# Patient Record
Sex: FEMALE | Race: WHITE | NOT HISPANIC OR LATINO | Employment: OTHER | URBAN - METROPOLITAN AREA
[De-identification: names, ages, dates, MRNs, and addresses within clinical notes are randomized per-mention and may not be internally consistent; named-entity substitution may affect disease eponyms.]

---

## 2017-01-12 ENCOUNTER — APPOINTMENT (OUTPATIENT)
Dept: NEUROLOGY | Facility: MEDICAL CENTER | Age: 67
End: 2017-01-12
Payer: MEDICAID

## 2017-01-18 ENCOUNTER — OFFICE VISIT (OUTPATIENT)
Dept: PHYSICAL MEDICINE AND REHAB | Facility: MEDICAL CENTER | Age: 67
End: 2017-01-18
Payer: MEDICAID

## 2017-01-18 VITALS
OXYGEN SATURATION: 92 % | SYSTOLIC BLOOD PRESSURE: 102 MMHG | DIASTOLIC BLOOD PRESSURE: 78 MMHG | HEART RATE: 77 BPM | TEMPERATURE: 98.1 F

## 2017-01-18 DIAGNOSIS — M16.12 ARTHRITIS OF LEFT HIP: ICD-10-CM

## 2017-01-18 DIAGNOSIS — Z98.1 S/P CERVICAL SPINAL FUSION: ICD-10-CM

## 2017-01-18 DIAGNOSIS — M25.552 LEFT HIP PAIN: ICD-10-CM

## 2017-01-18 DIAGNOSIS — F41.9 ANXIETY: ICD-10-CM

## 2017-01-18 DIAGNOSIS — M25.50 ARTHRALGIA, UNSPECIFIED JOINT: ICD-10-CM

## 2017-01-18 DIAGNOSIS — M62.838 MUSCLE SPASM: ICD-10-CM

## 2017-01-18 DIAGNOSIS — Z79.899 CONTROLLED SUBSTANCE AGREEMENT SIGNED: ICD-10-CM

## 2017-01-18 DIAGNOSIS — M54.2 NECK PAIN: ICD-10-CM

## 2017-01-18 DIAGNOSIS — M54.9 SPINAL PAIN: ICD-10-CM

## 2017-01-18 DIAGNOSIS — M19.90 ARTHRITIS: ICD-10-CM

## 2017-01-18 PROCEDURE — 99214 OFFICE O/P EST MOD 30 MIN: CPT | Performed by: PHYSICAL MEDICINE & REHABILITATION

## 2017-01-18 RX ORDER — CAPSAICIN 0.025 %
1 CREAM (GRAM) TOPICAL 3 TIMES DAILY PRN
COMMUNITY
Start: 2017-01-06 | End: 2019-04-26

## 2017-01-18 RX ORDER — HYDROMORPHONE HYDROCHLORIDE 4 MG/1
TABLET ORAL
Qty: 120 TAB | Refills: 0 | Status: SHIPPED | OUTPATIENT
Start: 2017-01-18 | End: 2017-01-18 | Stop reason: SDUPTHER

## 2017-01-18 RX ORDER — TIZANIDINE 4 MG/1
TABLET ORAL
Qty: 120 TAB | Refills: 5 | Status: SHIPPED | OUTPATIENT
Start: 2017-01-18 | End: 2021-07-31

## 2017-01-18 RX ORDER — MORPHINE SULFATE 60 MG/1
TABLET, FILM COATED, EXTENDED RELEASE ORAL
Qty: 60 TAB | Refills: 0 | Status: SHIPPED | OUTPATIENT
Start: 2017-01-18 | End: 2019-04-08

## 2017-01-18 RX ORDER — ALPRAZOLAM 0.5 MG/1
TABLET ORAL
Qty: 55 TAB | Refills: 0 | Status: SHIPPED | OUTPATIENT
Start: 2017-01-18 | End: 2017-03-21 | Stop reason: SDUPTHER

## 2017-01-18 RX ORDER — CITALOPRAM 40 MG/1
TABLET ORAL
Refills: 0 | COMMUNITY
Start: 2016-12-30

## 2017-01-18 RX ORDER — CITALOPRAM 20 MG/1
TABLET ORAL
Refills: 0 | COMMUNITY
Start: 2016-12-01 | End: 2017-03-06

## 2017-01-18 RX ORDER — MORPHINE SULFATE 60 MG/1
TABLET, FILM COATED, EXTENDED RELEASE ORAL
Qty: 60 TAB | Refills: 0 | Status: SHIPPED | OUTPATIENT
Start: 2017-01-18 | End: 2017-01-18 | Stop reason: SDUPTHER

## 2017-01-18 RX ORDER — ALPRAZOLAM 0.5 MG/1
TABLET ORAL
Qty: 60 TAB | Refills: 0 | Status: SHIPPED | OUTPATIENT
Start: 2017-01-18 | End: 2017-01-18 | Stop reason: SDUPTHER

## 2017-01-18 RX ORDER — HYDROMORPHONE HYDROCHLORIDE 4 MG/1
TABLET ORAL
Qty: 120 TAB | Refills: 0 | Status: ON HOLD | OUTPATIENT
Start: 2017-01-18 | End: 2017-03-09

## 2017-01-18 NOTE — MR AVS SNAPSHOT
Manisha Rincon   2017 4:00 PM   Office Visit   MRN: [id number]    Department:  [facility]   Dept Phone:  [phone]    Description:  Female : 1950   Provider:  Lev Beard M.D.           Reason for Visit     Follow-Up           Allergies as of 2017     No Known Allergies      You were diagnosed with     Left hip pain   [158485]       Arthritis of left hip   [2923782]       Spinal pain   [814664]       Arthralgia, unspecified joint   [7111786]       Arthritis   [966926]       Neck pain   [282010]       S/P cervical spinal fusion   [150499]       Controlled substance agreement signed   [085193]       Anxiety   [178723]       Muscle spasm   [263179]         Vital Signs     Blood Pressure Pulse Temperature Oxygen Saturation Smoking Status       102/78 mmHg 77 36.7 °C (98.1 °F) 92% Current Every Day Smoker       Basic Information     Date Of Birth Sex Race Ethnicity Preferred Language    1950 Female White Non- English      Your appointments     2017  9:50 AM   Pre Admission Scheduled with PRE ADMIT TEST 1 [facility]   PREADMIT TESTS [facility] (--)    [address] [facility] [address]   [phone]            Mar 10, 2017  3:00 PM   Follow Up Visit with Lev Beard M.D.   [facility] PHYSIATRY (--)    [address]  [facility] [address] [phone]           You will be receiving a confirmation call a few days before your appointment from our automated call confirmation system.            Mar 14, 2017  1:40 PM   New Patient with TRAVON Whitfield   [facility] (--)    [address] [facility], [address]  [facility] [address] [phone]           Please bring Photo ID, Insurance Cards, All Medication Bottles and copies of any legal documents (such as Living Will, Power of ) If speaking a language besides English please bring an adult . Please arrive 30 minutes prior for check in and registration.  You will be receiving a confirmation call a few days before your appointment from our automated call confirmation system.              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   7/7/2009 - Present    Gait instability R26.81   7/7/2009 - Present    Tobacco abuse Z72.0   7/7/2009 - Present    Vitamin d deficiency    12/1/2009 - Present    Hypoxemia R09.02   11/10/2015 - Present    Chronic pain G89.29   11/10/2015 - Present    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   11/10/2015 - Present    Pain medication agreement discussed Z79.899   1/22/2016 - Present    Diuretic-induced hypokalemia E87.6, T50.2X5A   1/22/2016 - Present    Burn T30.0   1/22/2016 - Present    Calcium blood increased E83.52   1/28/2016 - Present    Humpback M40.209   3/1/2016 - Present    Cervical stenosis of spine M48.02   4/8/2016 - Present    PNA (pneumonia) J18.9   4/13/2016 - Present    Hypotension I95.9   4/13/2016 - Present    Pneumonia J18.9   8/20/2016 - Present    Dehydration E86.0   8/20/2016 - Present    Macrocytic anemia D53.9   8/20/2016 - Present    Thrombocytopenia (CMS-HCC) D69.6   8/20/2016 - Present    Fall W19.XXXA High  8/20/2016 - Present      Health Maintenance        Date Due Completion Dates    IMM DTaP/Tdap/Td Vaccine (1 - Tdap) 8/26/1971 ---    IMM PNEUMOCOCCAL 19-64 (ADULT) MEDIUM RISK SERIES (1 of 1 - PPSV23) 8/26/1971 ---    PAP SMEAR 8/26/1973 ---    COLONOSCOPY 8/26/2002 ---    IMM ZOSTER VACCINE 8/26/2012 ---    IMM INFLUENZA (1) 9/1/2016 9/1/2015    MAMMOGRAM 10/25/2017 10/25/2016, 1/21/2015, 10/14/2013, 6/1/2011, 5/27/2009, 12/9/2008, 12/9/2008            Current Immunizations     Influenza Vaccine Quad Inj (Pf) 9/1/2015      Below  and/or attached are the medications your provider expects you to take. Review all of your home medications and newly ordered medications with your provider and/or pharmacist. Follow medication instructions as directed by your provider and/or pharmacist. Please keep your medication list with you and share with your provider. Update the information when medications are discontinued, doses are changed, or new medications (including over-the-counter products) are added; and carry medication information at all times in the event of emergency situations     Allergies:  No Known Allergies          Medications  Valid as of: January 18, 2017 -  4:40 PM    Generic Name Brand Name Tablet Size Instructions for use    Alendronate Sodium (Tab) FOSAMAX 70 MG Take 70 mg by mouth every Friday.        ALPRAZolam (Tab) XANAX 0.5 MG Take 1/2  to 1 tablet by mouth twice as needed for anxiety. One month supply.        Capsaicin (Cream) ZOSTRIX 0.025 %         Capsicum Oleoresin (Cream) TRIXAICIN 0.025 % Applied to affected area as directed.        Citalopram Hydrobromide (Tab) CELEXA 20 MG TAKE 1 TABLET BY MOUTH DAILY        Citalopram Hydrobromide (Tab) CELEXA 40 MG TAKE 1 TABLET BY MOUTH DAILY        Docusate Sodium (Cap) COLACE 100 MG Take 100 mg by mouth 2 times a day as needed.        Furosemide (Tab) LASIX 20 MG Take 20 mg by mouth 2 times a day as needed. Indications: Edema        Gabapentin (Cap) NEURONTIN 400 MG Take 1 Cap by mouth 3 times a day. as needed for nerve pain.        HYDROmorphone HCl (Tab) DILAUDID 4 MG Take 1 tablet by mouth every 6 hours as needed for breakthrough pain.        HydrOXYzine HCl (Tab) ATARAX 25 MG Take 1 Tab by mouth 3 times a day as needed for Anxiety.        Influenza Vac Split Quad (Suspension Prefilled Syringe) FLUARIX QUADRIVALENT 0.5 ML TO BE ADMINISTERDED BY PHARMACAIST        Morphine Sulfate (Tab CR) MS CONTIN 60 MG Take 1 tablet by mouth twice per day as needed for pain.        Nicotine  (PATCH 24 HR) NICOTINE STEP 2 14 MG/24HR         Nicotine (PATCH 24 HR) NICOTINE STEP 1 21 MG/24HR APPLY 1 PATCH BY TRANSDERMAL ROUTE DAILY        Potassium Chloride Emmanuelle CR (Tab CR) K-DUR 10 MEQ Take 10 mEq by mouth 2 times a day as needed (with lasix).        Prazosin HCl (Cap) MINIPRESS 2 MG Take 2 mg by mouth every evening.        Prazosin HCl (Cap) MINIPRESS 1 MG         TiZANidine HCl (Tab) ZANAFLEX 4 MG Take 1/2 to 1 tablet by mouth every 6 hours as needed for muscle spasms.        TraZODone HCl (Tab) DESYREL 50 MG Take one tablet by mouth at bedtime as needed for insomnia.        .                 Medicines prescribed today were sent to:     [facility]/PHARMACY #[id number] - [place], [place] - [address]    [address] [place] [address] [place] [address]    Phone: [phone] Fax: [phone]    Open 24 Hours?: No      Medication refill instructions:       If your prescription bottle indicates you have medication refills left, it is not necessary to call your provider’s office. Please contact your pharmacy and they will refill your medication.    If your prescription bottle indicates you do not have any refills left, you may request refills at any time through one of the following ways: The online [employer] system (except Urgent Care), by calling your provider’s office, or by asking your pharmacy to contact your provider’s office with a refill request. Medication refills are processed only during regular business hours and may not be available until the next business day. Your provider may request additional information or to have a follow-up visit with you prior to refilling your medication.   *Please Note: Medication refills are assigned a new Rx number when refilled electronically. Your pharmacy may indicate that no refills were authorized even though a new prescription for the same medication is available at the pharmacy. Please request the medicine by name with the pharmacy before contacting your provider for a refill.        Your To  Do List     Future Labs/Procedures Complete By Expires    DX-CERVICAL SPINE-4+ VIEWS  As directed 1/18/2018      Referral     A referral request has been sent to our patient care coordination department. Please allow 3-5 business days for us to process this request and contact you either by phone or mail. If you do not hear from us by the 5th business day, please call us at [phone].           MyChart Status: Patient Declined

## 2017-02-07 DIAGNOSIS — M79.2 NERVE PAIN: ICD-10-CM

## 2017-02-07 RX ORDER — GABAPENTIN 400 MG/1
400 CAPSULE ORAL 3 TIMES DAILY
Qty: 90 CAP | Refills: 5 | Status: SHIPPED | OUTPATIENT
Start: 2017-02-07 | End: 2017-07-27 | Stop reason: SDUPTHER

## 2017-03-02 ENCOUNTER — HOSPITAL ENCOUNTER (OUTPATIENT)
Dept: RADIOLOGY | Facility: MEDICAL CENTER | Age: 67
End: 2017-03-02
Attending: PHYSICAL MEDICINE & REHABILITATION
Payer: MEDICAID

## 2017-03-02 ENCOUNTER — HOSPITAL ENCOUNTER (OUTPATIENT)
Dept: LAB | Facility: MEDICAL CENTER | Age: 67
End: 2017-03-02
Attending: ORTHOPAEDIC SURGERY
Payer: MEDICAID

## 2017-03-02 DIAGNOSIS — M54.2 NECK PAIN: ICD-10-CM

## 2017-03-02 DIAGNOSIS — Z98.1 S/P CERVICAL SPINAL FUSION: ICD-10-CM

## 2017-03-02 LAB
ANION GAP SERPL CALC-SCNC: 9 MMOL/L (ref 0–11.9)
APPEARANCE UR: CLEAR
APTT PPP: 31.4 SEC (ref 24.7–36)
BASOPHILS # BLD AUTO: 0.03 K/UL (ref 0–0.12)
BASOPHILS NFR BLD AUTO: 0.8 % (ref 0–1.8)
BILIRUB UR QL STRIP.AUTO: NEGATIVE
BUN SERPL-MCNC: 18 MG/DL (ref 8–22)
CALCIUM SERPL-MCNC: 9.4 MG/DL (ref 8.5–10.5)
CHLORIDE SERPL-SCNC: 100 MMOL/L (ref 96–112)
CO2 SERPL-SCNC: 27 MMOL/L (ref 20–33)
COLOR UR AUTO: ABNORMAL
CREAT SERPL-MCNC: 0.87 MG/DL (ref 0.5–1.4)
EKG IMPRESSION: NORMAL
EOSINOPHIL # BLD: 0.11 K/UL (ref 0–0.51)
EOSINOPHIL NFR BLD AUTO: 2.8 % (ref 0–6.9)
EPITHELIAL CELLS 1715: ABNORMAL /HPF
ERYTHROCYTE [DISTWIDTH] IN BLOOD BY AUTOMATED COUNT: 46.9 FL (ref 35.9–50)
ERYTHROCYTE [SEDIMENTATION RATE] IN BLOOD BY WESTERGREN METHOD: 11 MM/HOUR (ref 0–30)
GLUCOSE SERPL-MCNC: 107 MG/DL (ref 65–99)
GLUCOSE UR STRIP.AUTO-MCNC: NEGATIVE MG/DL
HCT VFR BLD AUTO: 41.4 % (ref 37–47)
HGB BLD-MCNC: 13.4 G/DL (ref 12–16)
HYALINE CAST   1831: ABNORMAL /LPF
IMM GRANULOCYTES # BLD AUTO: 0.01 K/UL (ref 0–0.11)
IMM GRANULOCYTES NFR BLD AUTO: 0.3 % (ref 0–0.9)
INR PPP: 1.04 (ref 0.87–1.13)
KETONES UR STRIP.AUTO-MCNC: NEGATIVE MG/DL
LEUKOCYTE ESTERASE UR QL STRIP.AUTO: ABNORMAL
LYMPHOCYTES # BLD: 1.59 K/UL (ref 1–4.8)
LYMPHOCYTES NFR BLD AUTO: 40.7 % (ref 22–41)
MCH RBC QN AUTO: 28.8 PG (ref 27–33)
MCHC RBC AUTO-ENTMCNC: 32.4 G/DL (ref 33.6–35)
MCV RBC AUTO: 89 FL (ref 81.4–97.8)
MICRO URNS: ABNORMAL
MONOCYTES # BLD: 0.45 K/UL (ref 0–0.85)
MONOCYTES NFR BLD AUTO: 11.5 % (ref 0–13.4)
MUCOUS THREADS URNS QL MICRO: ABNORMAL /HPF
NEUTROPHILS # BLD: 1.72 K/UL (ref 2–7.15)
NEUTROPHILS NFR BLD AUTO: 43.9 % (ref 44–72)
NITRITE UR QL STRIP.AUTO: NEGATIVE
NRBC # BLD AUTO: 0 K/UL
NRBC BLD-RTO: 0 /100 WBC
PH UR: 6 [PH]
PLATELET # BLD AUTO: 163 K/UL (ref 164–446)
PMV BLD AUTO: 9.7 FL (ref 9–12.9)
POTASSIUM SERPL-SCNC: 3.6 MMOL/L (ref 3.6–5.5)
PROT UR QL STRIP: NEGATIVE MG/DL
PROTHROMBIN TIME: 13.9 SEC (ref 12–14.6)
RBC # BLD AUTO: 4.65 M/UL (ref 4.2–5.4)
RBC #/AREA URNS HPF: ABNORMAL /HPF
RBC UR QL AUTO: NEGATIVE
SODIUM SERPL-SCNC: 136 MMOL/L (ref 135–145)
SP GR UR STRIP.AUTO: 1.01
TRANS CELLS URNS QL MICRO: ABNORMAL /HPF
WBC # BLD AUTO: 3.9 K/UL (ref 4.8–10.8)
WBC #/AREA URNS HPF: ABNORMAL /HPF

## 2017-03-02 PROCEDURE — 72050 X-RAY EXAM NECK SPINE 4/5VWS: CPT

## 2017-03-02 PROCEDURE — 93010 ELECTROCARDIOGRAM REPORT: CPT | Performed by: INTERNAL MEDICINE

## 2017-03-03 ENCOUNTER — HOSPITAL ENCOUNTER (OUTPATIENT)
Dept: CARDIOLOGY | Facility: MEDICAL CENTER | Age: 67
End: 2017-03-03
Attending: ORTHOPAEDIC SURGERY
Payer: MEDICAID

## 2017-03-06 ENCOUNTER — TELEPHONE (OUTPATIENT)
Dept: PHYSICAL MEDICINE AND REHAB | Facility: MEDICAL CENTER | Age: 67
End: 2017-03-06

## 2017-03-06 ENCOUNTER — HOSPITAL ENCOUNTER (OUTPATIENT)
Dept: RADIOLOGY | Facility: MEDICAL CENTER | Age: 67
DRG: 470 | End: 2017-03-06
Attending: ORTHOPAEDIC SURGERY | Admitting: ORTHOPAEDIC SURGERY
Payer: MEDICAID

## 2017-03-06 DIAGNOSIS — Z01.812 PRE-OPERATIVE LABORATORY EXAMINATION: ICD-10-CM

## 2017-03-06 DIAGNOSIS — Z01.811 PRE-OPERATIVE RESPIRATORY EXAMINATION: ICD-10-CM

## 2017-03-06 LAB
SCCMEC + MECA PNL NOSE NAA+PROBE: POSITIVE
SCCMEC + MECA PNL NOSE NAA+PROBE: POSITIVE

## 2017-03-06 PROCEDURE — 71020 DX-CHEST-2 VIEWS: CPT

## 2017-03-06 PROCEDURE — 87641 MR-STAPH DNA AMP PROBE: CPT

## 2017-03-06 PROCEDURE — 87640 STAPH A DNA AMP PROBE: CPT

## 2017-03-06 RX ORDER — ASPIRIN 81 MG
1 TABLET, DELAYED RELEASE (ENTERIC COATED) ORAL 2 TIMES DAILY
COMMUNITY
End: 2019-04-11

## 2017-03-06 NOTE — DISCHARGE PLANNING
Met with patient during preadmission appointment.  Procedure: Total Hip  Procedure Date: 3/8/17  Insurance:  Medicaid FFS  Equipment currently available at home? FWW. Quad cane  Steps into the home? 2 with rail  Steps within the home? 2 story home, all bedrooms and bathroom are upstairs, they will look into a bed/cot for down stairs. There is a recliner.  Toilet height? Standard, discussed raised toilet seat for upstairs and 3:1 commode for downstairs  Type of shower? Tub shower, discussed obtaining tub-transfer bench  Who will be with you during your recovery?   Is Outpatient Physical Therapy set up after surgery? No  Did you take the Total Joint Class and where? With prior hip surgery in [place]    Plan: Equipment Resource list provided and patient was encouraged to go to [facility] to obtain equipment recommended above. She has refused SNF option for discharge. She uses O2 at night from [facility]. There is a recliner in the downstairs, they will try and obtain a bed/cot but may need a hospital bed. CTT-please check with patient, multiple barriers.

## 2017-03-06 NOTE — TELEPHONE ENCOUNTER
Manisha Kapoor's friend called to cancel upcoming appt and to let us know she will be having hip replacement surgery with Dr. Fitzgerald shortly and he will take care of pain medication for 3 months.

## 2017-03-06 NOTE — OR NURSING
Pt here for pre admit appointment. Pt has some obstacles to care at home post surgery / stairs/ no bathroom down or sleeping facilities down stairs other than a recliner chair. Pt states she is fighting with her pain management dr and does not feel her pain is being well managed. Pt to see Janelle Barton case coordination today.

## 2017-03-07 ENCOUNTER — APPOINTMENT (OUTPATIENT)
Dept: RADIOLOGY | Facility: MEDICAL CENTER | Age: 67
DRG: 470 | End: 2017-03-07
Attending: ORTHOPAEDIC SURGERY
Payer: MEDICAID

## 2017-03-07 ENCOUNTER — HOSPITAL ENCOUNTER (INPATIENT)
Facility: MEDICAL CENTER | Age: 67
LOS: 3 days | DRG: 470 | End: 2017-03-10
Attending: ORTHOPAEDIC SURGERY | Admitting: ORTHOPAEDIC SURGERY
Payer: MEDICAID

## 2017-03-07 DIAGNOSIS — R26.81 GAIT INSTABILITY: ICD-10-CM

## 2017-03-07 DIAGNOSIS — Z96.649 PAIN DUE TO HIP JOINT PROSTHESIS, INITIAL ENCOUNTER (HCC): ICD-10-CM

## 2017-03-07 DIAGNOSIS — M54.2 CHRONIC NECK PAIN: ICD-10-CM

## 2017-03-07 DIAGNOSIS — M25.552 LEFT HIP PAIN: ICD-10-CM

## 2017-03-07 DIAGNOSIS — T84.84XA PAIN DUE TO HIP JOINT PROSTHESIS, INITIAL ENCOUNTER (HCC): ICD-10-CM

## 2017-03-07 DIAGNOSIS — H54.40 BLIND LEFT EYE: ICD-10-CM

## 2017-03-07 DIAGNOSIS — G89.29 CHRONIC NECK PAIN: ICD-10-CM

## 2017-03-07 PROCEDURE — 770001 HCHG ROOM/CARE - MED/SURG/GYN PRIV*

## 2017-03-07 PROCEDURE — 110371 HCHG SHELL REV 272: Performed by: ORTHOPAEDIC SURGERY

## 2017-03-07 PROCEDURE — 700111 HCHG RX REV CODE 636 W/ 250 OVERRIDE (IP)

## 2017-03-07 PROCEDURE — 500088 HCHG BLADE, SAGITTAL: Performed by: ORTHOPAEDIC SURGERY

## 2017-03-07 PROCEDURE — 160036 HCHG PACU - EA ADDL 30 MINS PHASE I: Performed by: ORTHOPAEDIC SURGERY

## 2017-03-07 PROCEDURE — A6223 GAUZE >16<=48 NO W/SAL W/O B: HCPCS | Performed by: ORTHOPAEDIC SURGERY

## 2017-03-07 PROCEDURE — 0SRB02A REPLACEMENT OF LEFT HIP JOINT WITH METAL ON POLYETHYLENE SYNTHETIC SUBSTITUTE, UNCEMENTED, OPEN APPROACH: ICD-10-PCS | Performed by: ORTHOPAEDIC SURGERY

## 2017-03-07 PROCEDURE — 110382 HCHG SHELL REV 271: Performed by: ORTHOPAEDIC SURGERY

## 2017-03-07 PROCEDURE — A4606 OXYGEN PROBE USED W OXIMETER: HCPCS | Performed by: ORTHOPAEDIC SURGERY

## 2017-03-07 PROCEDURE — 160035 HCHG PACU - 1ST 60 MINS PHASE I: Performed by: ORTHOPAEDIC SURGERY

## 2017-03-07 PROCEDURE — 160042 HCHG SURGERY MINUTES - EA ADDL 1 MIN LEVEL 5: Performed by: ORTHOPAEDIC SURGERY

## 2017-03-07 PROCEDURE — 700102 HCHG RX REV CODE 250 W/ 637 OVERRIDE(OP): Performed by: ORTHOPAEDIC SURGERY

## 2017-03-07 PROCEDURE — 502240 HCHG MISC OR SUPPLY RC 0272: Performed by: ORTHOPAEDIC SURGERY

## 2017-03-07 PROCEDURE — 502000 HCHG MISC OR IMPLANTS RC 0278: Performed by: ORTHOPAEDIC SURGERY

## 2017-03-07 PROCEDURE — 500811 HCHG LENS/HOOD FOR SPACESUIT: Performed by: ORTHOPAEDIC SURGERY

## 2017-03-07 PROCEDURE — 160048 HCHG OR STATISTICAL LEVEL 1-5: Performed by: ORTHOPAEDIC SURGERY

## 2017-03-07 PROCEDURE — 700102 HCHG RX REV CODE 250 W/ 637 OVERRIDE(OP): Performed by: PHARMACIST

## 2017-03-07 PROCEDURE — 700111 HCHG RX REV CODE 636 W/ 250 OVERRIDE (IP): Performed by: ORTHOPAEDIC SURGERY

## 2017-03-07 PROCEDURE — A9270 NON-COVERED ITEM OR SERVICE: HCPCS | Performed by: ORTHOPAEDIC SURGERY

## 2017-03-07 PROCEDURE — 160002 HCHG RECOVERY MINUTES (STAT): Performed by: ORTHOPAEDIC SURGERY

## 2017-03-07 PROCEDURE — 502578 HCHG PACK, TOTAL HIP: Performed by: ORTHOPAEDIC SURGERY

## 2017-03-07 PROCEDURE — 501486 HCHG STRYKER IRRIG SET HC W/TUBING: Performed by: ORTHOPAEDIC SURGERY

## 2017-03-07 PROCEDURE — 700112 HCHG RX REV CODE 229: Performed by: ORTHOPAEDIC SURGERY

## 2017-03-07 PROCEDURE — 160009 HCHG ANES TIME/MIN: Performed by: ORTHOPAEDIC SURGERY

## 2017-03-07 PROCEDURE — 72170 X-RAY EXAM OF PELVIS: CPT

## 2017-03-07 PROCEDURE — 501838 HCHG SUTURE GENERAL: Performed by: ORTHOPAEDIC SURGERY

## 2017-03-07 PROCEDURE — 501485 HCHG STRYKER BRUSH FEMORAL CANAL: Performed by: ORTHOPAEDIC SURGERY

## 2017-03-07 PROCEDURE — A4314 CATH W/DRAINAGE 2-WAY LATEX: HCPCS | Performed by: ORTHOPAEDIC SURGERY

## 2017-03-07 PROCEDURE — A9270 NON-COVERED ITEM OR SERVICE: HCPCS | Performed by: PHARMACIST

## 2017-03-07 PROCEDURE — A9270 NON-COVERED ITEM OR SERVICE: HCPCS

## 2017-03-07 PROCEDURE — 700101 HCHG RX REV CODE 250

## 2017-03-07 PROCEDURE — 160031 HCHG SURGERY MINUTES - 1ST 30 MINS LEVEL 5: Performed by: ORTHOPAEDIC SURGERY

## 2017-03-07 PROCEDURE — 501487 HCHG STRYKER TIP: Performed by: ORTHOPAEDIC SURGERY

## 2017-03-07 PROCEDURE — 700102 HCHG RX REV CODE 250 W/ 637 OVERRIDE(OP)

## 2017-03-07 DEVICE — IMPLANT R3 3 HOLE ACET SHELL 56MM (1EA): Type: IMPLANTABLE DEVICE | Status: FUNCTIONAL

## 2017-03-07 DEVICE — IMPLANT R3 20 DEG XLPE LNR 40MM ID X 56OD: Type: IMPLANTABLE DEVICE | Status: FUNCTIONAL

## 2017-03-07 DEVICE — IMPLANT TITANIUM MODULAR HEAD SLV 12/14 TPR +0 (1EA): Type: IMPLANTABLE DEVICE | Status: FUNCTIONAL

## 2017-03-07 DEVICE — IMPLANTABLE DEVICE: Type: IMPLANTABLE DEVICE | Status: FUNCTIONAL

## 2017-03-07 DEVICE — IMPLANT 40MM OXINIUM MODULAR HEAD (1EA): Type: IMPLANTABLE DEVICE | Status: FUNCTIONAL

## 2017-03-07 RX ORDER — TRAZODONE HYDROCHLORIDE 50 MG/1
50 TABLET ORAL
Status: DISCONTINUED | OUTPATIENT
Start: 2017-03-07 | End: 2017-03-10 | Stop reason: HOSPADM

## 2017-03-07 RX ORDER — DEXTROSE MONOHYDRATE, SODIUM CHLORIDE, AND POTASSIUM CHLORIDE 50; 1.49; 4.5 G/1000ML; G/1000ML; G/1000ML
INJECTION, SOLUTION INTRAVENOUS CONTINUOUS
Status: DISCONTINUED | OUTPATIENT
Start: 2017-03-07 | End: 2017-03-10 | Stop reason: HOSPADM

## 2017-03-07 RX ORDER — MORPHINE SULFATE 60 MG/1
60 TABLET, FILM COATED, EXTENDED RELEASE ORAL EVERY 12 HOURS
Status: DISCONTINUED | OUTPATIENT
Start: 2017-03-07 | End: 2017-03-10 | Stop reason: HOSPADM

## 2017-03-07 RX ORDER — NICOTINE 21 MG/24HR
1 PATCH, TRANSDERMAL 24 HOURS TRANSDERMAL
Status: DISCONTINUED | OUTPATIENT
Start: 2017-03-07 | End: 2017-03-10 | Stop reason: HOSPADM

## 2017-03-07 RX ORDER — DEXAMETHASONE SODIUM PHOSPHATE 4 MG/ML
4 INJECTION, SOLUTION INTRA-ARTICULAR; INTRALESIONAL; INTRAMUSCULAR; INTRAVENOUS; SOFT TISSUE
Status: DISCONTINUED | OUTPATIENT
Start: 2017-03-07 | End: 2017-03-08

## 2017-03-07 RX ORDER — OXYCODONE HCL 5 MG/5 ML
SOLUTION, ORAL ORAL
Status: COMPLETED
Start: 2017-03-07 | End: 2017-03-07

## 2017-03-07 RX ORDER — DEXTROSE MONOHYDRATE, SODIUM CHLORIDE, AND POTASSIUM CHLORIDE 50; 1.49; 4.5 G/1000ML; G/1000ML; G/1000ML
INJECTION, SOLUTION INTRAVENOUS
Status: COMPLETED
Start: 2017-03-07 | End: 2017-03-07

## 2017-03-07 RX ORDER — HYDROMORPHONE HYDROCHLORIDE 4 MG/1
4 TABLET ORAL
Status: DISCONTINUED | OUTPATIENT
Start: 2017-03-07 | End: 2017-03-08

## 2017-03-07 RX ORDER — CEFAZOLIN SODIUM 2 G/100ML
2 INJECTION, SOLUTION INTRAVENOUS ONCE
Status: DISCONTINUED | OUTPATIENT
Start: 2017-03-07 | End: 2017-03-07

## 2017-03-07 RX ORDER — LIDOCAINE HYDROCHLORIDE 10 MG/ML
INJECTION, SOLUTION INFILTRATION; PERINEURAL
Status: COMPLETED
Start: 2017-03-07 | End: 2017-03-07

## 2017-03-07 RX ORDER — HYDROMORPHONE HYDROCHLORIDE 2 MG/ML
INJECTION, SOLUTION INTRAMUSCULAR; INTRAVENOUS; SUBCUTANEOUS
Status: COMPLETED
Start: 2017-03-07 | End: 2017-03-07

## 2017-03-07 RX ORDER — AMOXICILLIN 250 MG
1 CAPSULE ORAL
Status: DISCONTINUED | OUTPATIENT
Start: 2017-03-07 | End: 2017-03-10 | Stop reason: HOSPADM

## 2017-03-07 RX ORDER — SODIUM CHLORIDE, SODIUM LACTATE, POTASSIUM CHLORIDE, CALCIUM CHLORIDE 600; 310; 30; 20 MG/100ML; MG/100ML; MG/100ML; MG/100ML
1000 INJECTION, SOLUTION INTRAVENOUS
Status: COMPLETED | OUTPATIENT
Start: 2017-03-07 | End: 2017-03-07

## 2017-03-07 RX ORDER — CEFAZOLIN SODIUM 2 G/100ML
2 INJECTION, SOLUTION INTRAVENOUS EVERY 8 HOURS
Status: COMPLETED | OUTPATIENT
Start: 2017-03-07 | End: 2017-03-08

## 2017-03-07 RX ORDER — TIZANIDINE 4 MG/1
4 TABLET ORAL EVERY 8 HOURS PRN
Status: DISCONTINUED | OUTPATIENT
Start: 2017-03-07 | End: 2017-03-08

## 2017-03-07 RX ORDER — DIPHENHYDRAMINE HYDROCHLORIDE 50 MG/ML
25 INJECTION INTRAMUSCULAR; INTRAVENOUS EVERY 6 HOURS PRN
Status: DISCONTINUED | OUTPATIENT
Start: 2017-03-07 | End: 2017-03-10 | Stop reason: HOSPADM

## 2017-03-07 RX ORDER — ACETAMINOPHEN 500 MG
1000 TABLET ORAL EVERY 6 HOURS PRN
Status: DISCONTINUED | OUTPATIENT
Start: 2017-03-07 | End: 2017-03-10 | Stop reason: HOSPADM

## 2017-03-07 RX ORDER — CHLORPROMAZINE HYDROCHLORIDE 25 MG/ML
25 INJECTION INTRAMUSCULAR EVERY 6 HOURS PRN
Status: DISCONTINUED | OUTPATIENT
Start: 2017-03-07 | End: 2017-03-10 | Stop reason: HOSPADM

## 2017-03-07 RX ORDER — DOCUSATE SODIUM 100 MG/1
100 CAPSULE, LIQUID FILLED ORAL 2 TIMES DAILY
Status: DISCONTINUED | OUTPATIENT
Start: 2017-03-07 | End: 2017-03-07

## 2017-03-07 RX ORDER — FUROSEMIDE 20 MG/1
20 TABLET ORAL 2 TIMES DAILY PRN
Status: DISCONTINUED | OUTPATIENT
Start: 2017-03-07 | End: 2017-03-10 | Stop reason: HOSPADM

## 2017-03-07 RX ORDER — DIPHENHYDRAMINE HCL 25 MG
25 TABLET ORAL EVERY 6 HOURS PRN
Status: DISCONTINUED | OUTPATIENT
Start: 2017-03-07 | End: 2017-03-10 | Stop reason: HOSPADM

## 2017-03-07 RX ORDER — PRAZOSIN HYDROCHLORIDE 2 MG/1
2 CAPSULE ORAL NIGHTLY
Status: DISCONTINUED | OUTPATIENT
Start: 2017-03-07 | End: 2017-03-10 | Stop reason: HOSPADM

## 2017-03-07 RX ORDER — HALOPERIDOL 5 MG/ML
1 INJECTION INTRAMUSCULAR EVERY 6 HOURS PRN
Status: DISCONTINUED | OUTPATIENT
Start: 2017-03-07 | End: 2017-03-10 | Stop reason: HOSPADM

## 2017-03-07 RX ORDER — GABAPENTIN 400 MG/1
400 CAPSULE ORAL 3 TIMES DAILY
Status: DISCONTINUED | OUTPATIENT
Start: 2017-03-07 | End: 2017-03-10 | Stop reason: HOSPADM

## 2017-03-07 RX ORDER — ONDANSETRON 2 MG/ML
INJECTION INTRAMUSCULAR; INTRAVENOUS
Status: COMPLETED
Start: 2017-03-07 | End: 2017-03-07

## 2017-03-07 RX ORDER — CELECOXIB 200 MG/1
CAPSULE ORAL
Status: COMPLETED
Start: 2017-03-07 | End: 2017-03-07

## 2017-03-07 RX ORDER — ACETAMINOPHEN 500 MG
TABLET ORAL
Status: COMPLETED
Start: 2017-03-07 | End: 2017-03-07

## 2017-03-07 RX ORDER — POTASSIUM CHLORIDE 20 MEQ/1
10 TABLET, EXTENDED RELEASE ORAL 2 TIMES DAILY PRN
Status: DISCONTINUED | OUTPATIENT
Start: 2017-03-07 | End: 2017-03-10 | Stop reason: HOSPADM

## 2017-03-07 RX ORDER — BUPIVACAINE HYDROCHLORIDE AND EPINEPHRINE 5; 5 MG/ML; UG/ML
INJECTION, SOLUTION EPIDURAL; INTRACAUDAL; PERINEURAL
Status: DISCONTINUED | OUTPATIENT
Start: 2017-03-07 | End: 2017-03-07 | Stop reason: HOSPADM

## 2017-03-07 RX ORDER — ENEMA 19; 7 G/133ML; G/133ML
1 ENEMA RECTAL
Status: DISCONTINUED | OUTPATIENT
Start: 2017-03-07 | End: 2017-03-10 | Stop reason: HOSPADM

## 2017-03-07 RX ORDER — ONDANSETRON 2 MG/ML
4 INJECTION INTRAMUSCULAR; INTRAVENOUS EVERY 4 HOURS PRN
Status: DISCONTINUED | OUTPATIENT
Start: 2017-03-07 | End: 2017-03-10 | Stop reason: HOSPADM

## 2017-03-07 RX ORDER — PRAZOSIN HYDROCHLORIDE 1 MG/1
1 CAPSULE ORAL EVERY EVENING
Status: DISCONTINUED | OUTPATIENT
Start: 2017-03-07 | End: 2017-03-10 | Stop reason: HOSPADM

## 2017-03-07 RX ORDER — WARFARIN SODIUM 5 MG/1
5 TABLET ORAL
Status: DISCONTINUED | OUTPATIENT
Start: 2017-03-07 | End: 2017-03-09

## 2017-03-07 RX ORDER — POLYETHYLENE GLYCOL 3350 17 G/17G
1 POWDER, FOR SOLUTION ORAL 2 TIMES DAILY PRN
Status: DISCONTINUED | OUTPATIENT
Start: 2017-03-07 | End: 2017-03-10 | Stop reason: HOSPADM

## 2017-03-07 RX ORDER — BISACODYL 10 MG
10 SUPPOSITORY, RECTAL RECTAL
Status: DISCONTINUED | OUTPATIENT
Start: 2017-03-07 | End: 2017-03-10 | Stop reason: HOSPADM

## 2017-03-07 RX ORDER — CITALOPRAM 20 MG/1
40 TABLET ORAL DAILY
Status: DISCONTINUED | OUTPATIENT
Start: 2017-03-08 | End: 2017-03-10 | Stop reason: HOSPADM

## 2017-03-07 RX ORDER — DOCUSATE SODIUM 100 MG/1
100 CAPSULE, LIQUID FILLED ORAL 4 TIMES DAILY
Status: DISCONTINUED | OUTPATIENT
Start: 2017-03-07 | End: 2017-03-10 | Stop reason: HOSPADM

## 2017-03-07 RX ORDER — MIDAZOLAM HYDROCHLORIDE 1 MG/ML
INJECTION INTRAMUSCULAR; INTRAVENOUS
Status: COMPLETED
Start: 2017-03-07 | End: 2017-03-07

## 2017-03-07 RX ORDER — KETOROLAC TROMETHAMINE 30 MG/ML
30 INJECTION, SOLUTION INTRAMUSCULAR; INTRAVENOUS EVERY 6 HOURS PRN
Status: DISCONTINUED | OUTPATIENT
Start: 2017-03-07 | End: 2017-03-10 | Stop reason: HOSPADM

## 2017-03-07 RX ORDER — CHLORPROMAZINE HYDROCHLORIDE 10 MG/1
25 TABLET, FILM COATED ORAL EVERY 6 HOURS PRN
Status: DISCONTINUED | OUTPATIENT
Start: 2017-03-07 | End: 2017-03-10 | Stop reason: HOSPADM

## 2017-03-07 RX ORDER — HYDROXYZINE 50 MG/1
25 TABLET, FILM COATED ORAL 3 TIMES DAILY PRN
Status: DISCONTINUED | OUTPATIENT
Start: 2017-03-07 | End: 2017-03-10 | Stop reason: HOSPADM

## 2017-03-07 RX ORDER — AMOXICILLIN 250 MG
1 CAPSULE ORAL NIGHTLY
Status: DISCONTINUED | OUTPATIENT
Start: 2017-03-07 | End: 2017-03-10 | Stop reason: HOSPADM

## 2017-03-07 RX ADMIN — ACETAMINOPHEN 1000 MG: 500 TABLET, FILM COATED ORAL at 07:15

## 2017-03-07 RX ADMIN — FENTANYL CITRATE 50 MCG: 50 INJECTION, SOLUTION INTRAMUSCULAR; INTRAVENOUS at 09:35

## 2017-03-07 RX ADMIN — POTASSIUM CHLORIDE, DEXTROSE MONOHYDRATE AND SODIUM CHLORIDE 1000 ML: 150; 5; 450 INJECTION, SOLUTION INTRAVENOUS at 11:00

## 2017-03-07 RX ADMIN — CEFAZOLIN SODIUM 2 G: 2 INJECTION, SOLUTION INTRAVENOUS at 16:55

## 2017-03-07 RX ADMIN — SODIUM CHLORIDE, SODIUM LACTATE, POTASSIUM CHLORIDE, CALCIUM CHLORIDE 1000 ML: 600; 310; 30; 20 INJECTION, SOLUTION INTRAVENOUS at 06:55

## 2017-03-07 RX ADMIN — DOCUSATE SODIUM 100 MG: 100 CAPSULE ORAL at 21:11

## 2017-03-07 RX ADMIN — MORPHINE SULFATE 60 MG: 60 TABLET, EXTENDED RELEASE ORAL at 21:11

## 2017-03-07 RX ADMIN — VANCOMYCIN HYDROCHLORIDE: 1 INJECTION, POWDER, LYOPHILIZED, FOR SOLUTION INTRAVENOUS at 06:55

## 2017-03-07 RX ADMIN — HYDROMORPHONE HYDROCHLORIDE 0.5 MG: 2 INJECTION INTRAMUSCULAR; INTRAVENOUS; SUBCUTANEOUS at 10:30

## 2017-03-07 RX ADMIN — LIDOCAINE HYDROCHLORIDE: 10 INJECTION, SOLUTION INFILTRATION; PERINEURAL at 06:50

## 2017-03-07 RX ADMIN — CELECOXIB 400 MG: 200 CAPSULE ORAL at 07:15

## 2017-03-07 RX ADMIN — ONDANSETRON 4 MG: 2 INJECTION, SOLUTION INTRAMUSCULAR; INTRAVENOUS at 09:45

## 2017-03-07 RX ADMIN — TRAZODONE HYDROCHLORIDE 50 MG: 50 TABLET ORAL at 21:10

## 2017-03-07 RX ADMIN — GABAPENTIN 400 MG: 400 CAPSULE ORAL at 21:11

## 2017-03-07 RX ADMIN — MIDAZOLAM 1 MG: 1 INJECTION INTRAMUSCULAR; INTRAVENOUS at 09:50

## 2017-03-07 RX ADMIN — WARFARIN SODIUM 5 MG: 5 TABLET ORAL at 19:16

## 2017-03-07 RX ADMIN — STANDARDIZED SENNA CONCENTRATE AND DOCUSATE SODIUM 1 TABLET: 8.6; 5 TABLET, FILM COATED ORAL at 21:11

## 2017-03-07 RX ADMIN — FENTANYL CITRATE 50 MCG: 50 INJECTION, SOLUTION INTRAMUSCULAR; INTRAVENOUS at 09:40

## 2017-03-07 RX ADMIN — HYDROMORPHONE HYDROCHLORIDE 0.5 MG: 2 INJECTION INTRAMUSCULAR; INTRAVENOUS; SUBCUTANEOUS at 12:45

## 2017-03-07 RX ADMIN — KETOROLAC TROMETHAMINE 30 MG: 30 INJECTION, SOLUTION INTRAMUSCULAR; INTRAVENOUS at 19:46

## 2017-03-07 RX ADMIN — OXYCODONE HYDROCHLORIDE 10 MG: 5 SOLUTION ORAL at 09:45

## 2017-03-07 ASSESSMENT — PAIN SCALES - GENERAL
PAINLEVEL_OUTOF10: 10
PAINLEVEL_OUTOF10: 10
PAINLEVEL_OUTOF10: 7
PAINLEVEL_OUTOF10: 10
PAINLEVEL_OUTOF10: 10
PAINLEVEL_OUTOF10: 5
PAINLEVEL_OUTOF10: 10
PAINLEVEL_OUTOF10: ASSUMED PAIN PRESENT

## 2017-03-07 ASSESSMENT — COPD QUESTIONNAIRES
DURING THE PAST 4 WEEKS HOW MUCH DID YOU FEEL SHORT OF BREATH: NONE/LITTLE OF THE TIME
DO YOU EVER COUGH UP ANY MUCUS OR PHLEGM?: NO/ONLY WITH OCCASIONAL COLDS OR INFECTIONS
HAVE YOU SMOKED AT LEAST 100 CIGARETTES IN YOUR ENTIRE LIFE: YES
COPD SCREENING SCORE: 4

## 2017-03-07 ASSESSMENT — LIFESTYLE VARIABLES: EVER_SMOKED: YES

## 2017-03-07 NOTE — IP AVS SNAPSHOT
3/10/2017          Manisha Rincon  [address]  [place] [address]    Dear Manisha:    [facility] wants to ensure your discharge home is safe and you or your loved ones have had all your questions answered regarding your care after you leave the hospital.    You may receive a telephone call within two days of your discharge.  This call is to make certain you understand your discharge instructions as well as ensure we provided you with the best care possible during your stay with us.     The call will only last approximately 3-5 minutes and will be done by a nurse.    Once again, we want to ensure your discharge home is safe and that you have a clear understanding of any next steps in your care.  If you have any questions or concerns, please do not hesitate to contact us, we are here for you.  Thank you for choosing [facility] for your healthcare needs.    Sincerely,    Yovanny Fitzgerald    [facility]

## 2017-03-07 NOTE — IP AVS SNAPSHOT
" <p align=\"LEFT\"><IMG SRC=\"//EMRWB/blob$/Images/[email or web address]\" alt=\"Image\" WIDTH=\"50%\" HEIGHT=\"200\" BORDER=\"\"></p>                   Name:Manisha Rincon  Medical Record Number:[id number]  CSN: [id number]    YOB: 1950   Age: 66 y.o.  Sex: female  HT:1.651 m (5' 5\") WT: 60.5 kg (133 lb 6.1 oz)          Admit Date: 3/7/2017     Discharge Date:   Today's Date: 3/10/2017  Attending Doctor:  Serafin Fitzgerald M.D.                  Allergies:  Review of patient's allergies indicates no known allergies.          Your appointments     Mar 13, 2017  1:30 PM   New Patient with [facility]    [address]  [facility] [address] [phone]           Please bring Photo ID, Insurance Cards, All Medication Bottles and copies of any legal documents (such as Living Will, Power of ) If speaking a language besides English please bring an adult . Please arrive 30 minutes prior for check in and registration. You will be receiving a confirmation call a few days before your appointment from our automated call confirmation system.            Mar 14, 2017  1:40 PM   New Patient with TRAVON Whitfield   [facility] (--)    [address], [address]  [facility] [id number] [phone]           Please bring Photo ID, Insurance Cards, All Medication Bottles and copies of any legal documents (such as Living Will, Power of ) If speaking a language besides English please bring an adult . Please arrive 30 minutes prior for check in and registration. You will be receiving a confirmation call a few days before your appointment from our automated call confirmation system.              Follow-up Information     1. Follow up with Serafin Fitzgerald M.D. In 2 weeks.    Specialty:  Orthopaedics    Contact information    [address] [facility] [address]  [facility] [address]  [phone]           Medication " List      Take these Medications        Instructions    alendronate 70 MG Tabs   Commonly known as:  FOSAMAX    Take 70 mg by mouth every Friday.   Dose:  70 mg       alprazolam 0.5 MG Tabs   Commonly known as:  XANAX    Doctor's comments:  The earliest date the pharmacy may fill fill the rx is 2/16/2017.   Take 1/2  to 1 tablet by mouth twice as needed for anxiety. One month supply.       capsaicin 0.025 % cream   Commonly known as:  ZOSTRIX    Apply 1 Each to affected area(s) 3 times a day as needed.   Dose:  1 Each       citalopram 40 MG Tabs   Commonly known as:  CELEXA    TAKE 1 TABLET BY MOUTH DAILY       furosemide 20 MG Tabs   Commonly known as:  LASIX    Take 20 mg by mouth 2 times a day as needed. Indications: Edema   Dose:  20 mg       gabapentin 400 MG Caps   Commonly known as:  NEURONTIN    Doctor's comments:  Fill at monthly intervals or greater.   Take 1 Cap by mouth 3 times a day. as needed for nerve pain.   Dose:  400 mg       hydrOXYzine 25 MG Tabs   Commonly known as:  ATARAX    Doctor's comments:  Tapering patient off of xanax/benzodiazepines.   Take 1 Tab by mouth 3 times a day as needed for Anxiety.   Dose:  25 mg       morphine ER 60 MG Tbcr tablet   Commonly known as:  MS CONTIN    Doctor's comments:  The earliest date the pharmacy may fill fill the rx is 2/16/2017.   Take 1 tablet by mouth twice per day as needed for pain.       NICOTINE STEP 1 21 MG/24HR Pt24   Generic drug:  nicotine    APPLY 1 PATCH BY TRANSDERMAL ROUTE DAILY       Oxycodone HCl 20 MG Tabs    Take 1 Tab by mouth every 3 hours as needed for Moderate Pain or Severe Pain ((4-6)).   Dose:  20 mg       potassium chloride SA 10 MEQ Tbcr   Commonly known as:  K-DUR    Take 10 mEq by mouth 2 times a day as needed (with lasix).   Dose:  10 mEq       * prazosin 2 MG Caps   Commonly known as:  MINIPRESS   Notes to Patient:  As prescribed    Take 2 mg by mouth every evening.   Dose:  2 mg       * prazosin 1 MG Caps   Commonly known  as:  MINIPRESS    Take 1 mg by mouth every evening.   Dose:  1 mg       STOOL SOFTENER 100 MG Tabs   Generic drug:  Docusate Sodium    Take 1 Tab by mouth 2 Times a Day.   Dose:  1 Tab       tizanidine 4 MG Tabs   Commonly known as:  ZANAFLEX    Doctor's comments:  Replaces prior tizanidine rx.   Take 1/2 to 1 tablet by mouth every 6 hours as needed for muscle spasms.       trazodone 50 MG Tabs   Commonly known as:  DESYREL    Doctor's comments:  Fill at monthly intervals or greater.   Take one tablet by mouth at bedtime as needed for insomnia.       warfarin 5 MG Tabs   Commonly known as:  COUMADIN    Take 1 Tab by mouth COUMADIN-DAILY.   Dose:  5 mg       * Notice:  This list has 2 medication(s) that are the same as other medications prescribed for you. Read the directions carefully, and ask your doctor or other care provider to review them with you.

## 2017-03-07 NOTE — IP AVS SNAPSHOT
Cy[employer]ics Access Code: Activation code not generated  Current Cytonics Status: Patient Declined    Your email address is not on file at [facility].  Email Addresses are required for you to sign up for [employer]ics, please contact [phone] to verify your personal information and to provide your email address prior to attempting to register for Cy[employer]ics.    Manisha Rincon  [address]  [place] [address]    Cytonics  A secure, online tool to manage your health information     [facility]’s Cytonics® is a secure, online tool that connects you to your personalized health information from the privacy of your home -- day or night - making it very easy for you to manage your healthcare. Once the activation process is completed, you can even access your medical information using the Cytonics kailyn, which is available for free in the Apple Kailyn store or Google Play store.     To learn more about Cytonics, visit [email or web address]    There are two levels of access available (as shown below):   My Chart Features  [facility] Primary Care Doctor [facility]  Specialists [facility]  Urgent  Care Non-[facility] Primary Care Doctor   Email your healthcare team securely and privately 24/7 X X X    Manage appointments: schedule your next appointment; view details of past/upcoming appointments X      Request prescription refills. X      View recent personal medical records, including lab and immunizations X X X X   View health record, including health history, allergies, medications X X X X   Read reports about your outpatient visits, procedures, consult and ER notes X X X X   See your discharge summary, which is a recap of your hospital and/or ER visit that includes your diagnosis, lab results, and care plan X X  X     How to register for [employer]:  Once your e-mail address has been verified, follow the following steps to sign up for [employer].     1. Go to  [email or web address]  2. Click on the Sign Up Now box, which takes you to the New Member  Sign Up page. You will need to provide the following information:  a. Enter your [employer] Access Code exactly as it appears at the top of this page. (You will not need to use this code after you’ve completed the sign-up process. If you do not sign up before the expiration date, you must request a new code.)   b. Enter your date of birth.   c. Enter your home email address.   d. Click Submit, and follow the next screen’s instructions.  3. Create a Qritiqr ID. This will be your [employer]rit[employer]r login ID and cannot be changed, so think of one that is secure and easy to remember.  4. Create a [employer] password. You can change your password at any time.  5. Enter your Password Reset Question and Answer. This can be used at a later time if you forget your password.   6. Enter your e-mail address. This allows you to receive e-mail notifications when new information is available in Qritiqr.  7. Click Sign Up. You can now view your health information.    For assistance activating your [employer] account, call [phone]

## 2017-03-07 NOTE — IP AVS SNAPSHOT
" Home Care Instructions                                                                                                                  Name:Manisha Rincon  Medical Record Number:[id number]  CSN: [id number]    YOB: 1950   Age: 66 y.o.  Sex: female  HT:1.651 m (5' 5\") WT: 60.5 kg (133 lb 6.1 oz)          Admit Date: 3/7/2017     Discharge Date:   Today's Date: 3/10/2017  Attending Doctor:  Serafin Fitzgerald M.D.                  Allergies:  Review of patient's allergies indicates no known allergies.            Discharge Instructions       Discharge Instructions  *Follow up with Dr. Fitzgerald in 2 weeks  *Weight bearing as tolerated                 *Activity as tolerated  *Use assistive device for all activity  *Continue exercises provided by physical therapy  *Elevate leg as needed  *Ice as needed (20 minutes every 1-2 hours)  *Change dressing as needed  *Ok to shower once home.  *No soaking of the incision; no baths, hot tubs, or swimming until cleared by doctor         *No driving until cleared by doctor  *Take medications as prescribed by doctor  *Call doctor’s office with any questions or concerns     Discharged to home by car with relative. Discharged via wheelchair, hospital escort: Yes.  Special equipment needed: Walker    Be sure to schedule a follow-up appointment with your primary care doctor or any specialists as instructed.     Discharge Plan:   Diet Plan: Discussed  Activity Level: Discussed  Smoking Cessation Offered: Patient Refused  Confirmed Follow up Appointment: Patient to Call and Schedule Appointment  Confirmed Symptoms Management: Discussed  Medication Reconciliation Updated: Yes  Influenza Vaccine Indication: Indicated: 9 to 64 years of age  Influenza Vaccine Given - only chart on this line when given: Influenza Vaccine Given (See MAR)    I understand that a diet low in cholesterol, fat, and sodium is recommended for good health. Unless I have been given specific instructions below for " another diet, I accept this instruction as my diet prescription.   Other diet: Diet as tolerated    Special Instructions:     Discharge instructions for the Orthopedic Patient    Follow up with Primary Care Physician within 2 weeks of discharge to home, regarding:  Review of medications and diagnostic testing.  Surveillance for medical complications.  Workup and treatment of osteoporosis, if appropriate.     -Is this a Joint Replacement patient? Yes   Total Joint Hip Replacement Discharge Instructions    Pain  - The goal is to slowly wean off the prescription pain medicine.  - Ice can be used for pain control.  20 minutes at a time is recommended, and never directly against your skin or incision.  - Most patients are off the pain pills by 3 weeks; others may require a low level of pain medications for many months. If your pain continues to be severe, follow up with your physician.  Infection  Deep hip joint infections that require removal of the prostheses occur in less than 0.1% of patients. Lesser infections in the skin (cellulites) are more common and much more easily treated.  - Keep the incision as clean and dry as possible.  - Always wash your hands before touching your incision.  - Skin infections tend to develop around 7-10 days after surgery, most can be treated with oral antibiotics.  - Dental Care should be delayed for 3 months after surgery, your surgeon recommends taking a dose of antibiotics 1 hour prior to any dental procedure.  After 2 years, most surgeons recommend antibiotics only before an extensive procedure.  Ask your surgeon what he recommends.  - Signs and symptoms of infection can include:  low grade fever, redness, pain, swelling and drainage from your incision.  Notify your surgeon immediately if you develop any of these symptoms.  Post op Disturbances  - Bowel habits - constipation is extremely common and is caused by a combination of anesthesia, lack of mobility and pain medicine.  Use  stool softeners or laxatives if necessary. It is important not to ignore this problem, as bowel obstructions can be a serious complication after joint replacement surgery.  - Mood/Energy Level - Many patients experience a lack of energy and endurance for up to 2-3 months after surgery.  Some may also feel down and can even become depressed.  This is likely due to the postoperative anemia, change in activity level, lack of sleep, pain medicine and just the emotional reaction to the surgery itself that is a big disruption in a person’s life.  This usually passes.  If symptoms persist, follow up with your primary physician.  - Returning to work - Your surgeon will give you more specific instructions.  Generally, if you work a sedentary job requiring little standing or walking, most patients may return within 2-6 weeks.  Manual labor jobs involving walking, lifting and standing may take 3-4 months.  Your surgeon’s office can provide a release to part-time or light duty work early on in your recovery and progress you to full duty as able.  - Driving - You can begin driving an automatic shift car in 4 to 8 weeks, provided you are no longer taking narcotic pain medication. If you have a stick-shift car and your right hip was replaced, do not begin driving until your doctor says you can.   - Avoiding falls -  throw rugs and tack down loose carpeting.  Be aware of floor hazards such as pets, small objects or uneven surfaces.   -  Airport Metal Detectors - The sensitivity of metal detectors varies and it is likely that your prosthesis will cause an alarm. Inform the  that you have an artificial joint.  Diet  - Resume your normal diet as tolerated.  - It is important to achieve a healthy nutritional status by eating a well balanced diet on a regular basis.  - Your physician may recommend that you take iron and vitamin supplements.   - Continue to drink plenty of fluids.  Shower/Bathing  - You may shower  as soon as you get home from the hospital unless otherwise instructed.  - Keep your incision out of water.  To keep the incision dry when showering, cover it with a plastic bag or plastic wrap.  - Pat incision dry if it gets wet.  Don’t rub.  - Do not submerge in a bath until staples are out and the incision is completely healed. (Approximately 6-8 weeks after surgery).  Dressing Change:  Procedure (if recommended by your physician)  - Wash hands.  - Open all dressing change materials.  - Remove old dressing and discard.  - Inspect incision for redness, increase in clear drainage, yellow/green drainage, odor and surrounding skin hot to touch.  -  ABD (large gauze) pad by one corner and lay over the incision.  Be careful not to touch the inside of the dressing that will lay over the incision.  - Secure in place as instructed (Ace wrap or tape).    Swelling/Bruising  - Swelling is normal after hip replacement and can involve the thigh, knee, calf and foot.  - Swelling can last from 3-6 months.  - Elevate your leg higher than your heart while reclining.  The first week you are home you should elevate your leg an equal amount of time, as you are active.    - Anti-inflammatory pills can be taken once you have stopped the blood thinners.  - The swelling is usually worse after you go home since you are upright for longer periods of time.  - Bruising is common and can involve the entire leg including the thigh, calf and even foot.  Bruising often does not appear until after you arrive home and it can be quite dramatic- purple, black, green.  The bruising you can see is not usually concerning and will subside without any treatment.      Blood Clot Prevention  Blood clots in the legs and the less common, but frightening, clots that travel to the lungs are a real focus of our preventative. Most patients are at standard risk for them, but those patients who are at higher risk include people who have had previous clots, a  family history of clotting, smoking, diabetes, obesity, advanced age, use of estrogen and a sedentary lifestyle.    - Signs of blood clots in legs - Swelling in thigh, calf or ankle that does not go down with elevation.  Pain, heat and tenderness in calf, back of calf or groin area.  NOTE: blood clots can occur in either leg.  - You have been receiving anticoagulant therapy (blood thinners) in the hospital and you may be instructed to continue at home depending on your risk factors.  - Your risk for developing a clot continues for up to 2-3 months after surgery.  You should avoid prolonged sitting and dehydration during that time (long air trips and car trips).  If you do take a trip during this time, please get up and move around every 1- 1.5 hours.  - If you are prescribed blood thinning medication for home, follow instructions as directed. (Handouts provided if applicable).      Activity    Once you get home, you should stay active. The key is not to overdo it! While you can expect some good days and some bad days, you should notice a gradual improvement over time you should notice a gradual improvement and a gradual increase in your endurance over the next 6 to 12 months.    - Weight Bearing - If you have undergone cemented or hybrid hip replacement, you can put some weight on the leg immediately using a cane or walker, and you should continue to use some support for 4 to 6 weeks to help the muscles recover.   - Sleeping Positions - Sleep on your back with your legs slightly apart or on your side with a regular pillow between your knees. Be sure to use the pillow for at least 6 weeks, or until your doctor says you can do without it. Sleeping on your stomach should be all right  - Sitting - For at least the first 3 months, sit only in chairs that have arms. Do not sit on low chairs, low stools, or reclining chairs. Do not cross your legs at the knees. The physical therapist will show you how to sit and stand from  a chair, keeping your affected leg out in front of you. Get up and move around on a regular basis--at least once every hour.  - Walking - Walk as much as you like once your doctor gives you the go-ahead, but remember that walking is no substitute for your prescribed exercises. Walking with a pair of trekking poles is helpful and adds as much as 40% to the exercise you get when you walk  - Therapy may be needed in some cases, to strengthen your muscles and improve your gait (walking pattern).  This decision will be made at your post-operative appointment.  Follow your therapist recommended post-operative exercises (handout provided by Therapist).  - Swimming is also recommended; you can begin as soon as the sutures have been removed and the wound is healed, approximately 6 to 8 weeks after surgery. Using a pair of training fins may make swimming a more enjoyable and effective exercise.  - Other activities - Lower impact activities are preferred.  If you have specific questions, consult your Surgeon.    - Sexual activity - Your surgeon can tell you when it should be safe to resume sexual activity.      When to Call the Doctor   Call the physician if:   - Fever over 100.5? F  - Increased pain, drainage, redness, odor or heat around the incision area  - Shaking chills  - Increased knee pain with activity and rest  - Increased pain in calf, tenderness or redness above or below the knee  - Increased swelling of calf, ankle, foot  - Sudden increased shortness of breath, sudden onset of chest pain, localized chest pain with coughing  - Incision opening  Or, if there are any questions or concerns about medications or care.       -Is this patient being discharged with medication to prevent blood clots?  No    · Is patient discharged on Warfarin / Coumadin?   Yes    You are receiving the drug warfarin. Please understand the importance of monitoring warfarin with scheduled PT/INR blood draws.  Follow-up with the [facility]  "in one week for INR lab..    IMPORTANT: HOW TO USE THIS INFORMATION:  This is a summary and does NOT have all possible information about this product. This information does not assure that this product is safe, effective, or appropriate for you. This information is not individual medical advice and does not substitute for the advice of your health care professional. Always ask your health care professional for complete information about this product and your specific health needs.      WARFARIN - ORAL (WARF-uh-rin)      COMMON BRAND NAME(S): Coumadin      WARNING:  Warfarin can cause very serious (possibly fatal) bleeding. This is more likely to occur when you first start taking this medication or if you take too much warfarin. To decrease your risk for bleeding, your doctor or other health care provider will monitor you closely and check your lab results (INR test) to make sure you are not taking too much warfarin. Keep all medical and laboratory appointments. Tell your doctor right away if you notice any signs of serious bleeding. See also Side Effects section.      USES:  This medication is used to treat blood clots (such as in deep vein thrombosis-DVT or pulmonary embolus-PE) and/or to prevent new clots from forming in your body. Preventing harmful blood clots helps to reduce the risk of a stroke or heart attack. Conditions that increase your risk of developing blood clots include a certain type of irregular heart rhythm (atrial fibrillation), heart valve replacement, recent heart attack, and certain surgeries (such as hip/knee replacement). Warfarin is commonly called a \"blood thinner,\" but the more correct term is \"anticoagulant.\" It helps to keep blood flowing smoothly in your body by decreasing the amount of certain substances (clotting proteins) in your blood.      HOW TO USE:  Read the Medication Guide provided by your pharmacist before you start taking warfarin and each time you get a refill. If you have " any questions, ask your doctor or pharmacist. Take this medication by mouth with or without food as directed by your doctor or other health care professional, usually once a day. It is very important to take it exactly as directed. Do not increase the dose, take it more frequently, or stop using it unless directed by your doctor. Dosage is based on your medical condition, laboratory tests (such as INR), and response to treatment. Your doctor or other health care provider will monitor you closely while you are taking this medication to determine the right dose for you. Use this medication regularly to get the most benefit from it. To help you remember, take it at the same time each day. It is important to eat a balanced, consistent diet while taking warfarin. Some foods can affect how warfarin works in your body and may affect your treatment and dose. Avoid sudden large increases or decreases in your intake of foods high in vitamin K (such as broccoli, cauliflower, cabbage, brussels sprouts, kale, spinach, and other green leafy vegetables, liver, green tea, certain vitamin supplements). If you are trying to lose weight, check with your doctor before you try to go on a diet. Cranberry products may also affect how your warfarin works. Limit the amount of cranberry juice (16 ounces/480 milliliters a day) or other cranberry products you may drink or eat.      SIDE EFFECTS:  Nausea, loss of appetite, or stomach/abdominal pain may occur. If any of these effects persist or worsen, tell your doctor or pharmacist promptly. Remember that your doctor has prescribed this medication because he or she has judged that the benefit to you is greater than the risk of side effects. Many people using this medication do not have serious side effects. This medication can cause serious bleeding if it affects your blood clotting proteins too much (shown by unusually high INR lab results). Even if your doctor stops your medication, this risk  of bleeding can continue for up to a week. Tell your doctor right away if you have any signs of serious bleeding, including: unusual pain/swelling/discomfort, unusual/easy bruising, prolonged bleeding from cuts or gums, persistent/frequent nosebleeds, unusually heavy/prolonged menstrual flow, pink/dark urine, coughing up blood, vomit that is bloody or looks like coffee grounds, severe headache, dizziness/fainting, unusual or persistent tiredness/weakness, bloody/black/tarry stools, chest pain, shortness of breath, difficulty swallowing. Tell your doctor right away if any of these unlikely but serious side effects occur: persistent nausea/vomiting, severe stomach/abdominal pain, yellowing eyes/skin. This drug rarely has caused very serious (possibly fatal) problems if its effects lead to small blood clots (usually at the beginning of treatment). This can lead to severe skin/tissue damage that may require surgery or amputation if left untreated. Patients with certain blood conditions (protein C or S deficiency) may be at greater risk. Get medical help right away if any of these rare but serious side effects occur: painful/red/purplish patches on the skin (such as on the toe, breast, abdomen), change in the amount of urine, vision changes, confusion, slurred speech, weakness on one side of the body. A very serious allergic reaction to this drug is rare. However, get medical help right away if you notice any symptoms of a serious allergic reaction, including: rash, itching/swelling (especially of the face/tongue/throat), severe dizziness, trouble breathing. This is not a complete list of possible side effects. If you notice other effects not listed above, contact your doctor or pharmacist. In the [place] - Call your doctor for medical advice about side effects. You may report side effects to FDA at 0-109-FDA-5574. In [place] - Call your doctor for medical advice about side effects. You may report side effects to [facility] [place]  at 1-527.178.4779.      PRECAUTIONS:  Before taking warfarin, tell your doctor or pharmacist if you are allergic to it; or if you have any other allergies. This product may contain inactive ingredients, which can cause allergic reactions or other problems. Talk to your pharmacist for more details. Before using this medication, tell your doctor or pharmacist your medical history, especially of: blood disorders (such as anemia, hemophilia), bleeding problems (such as bleeding of the stomach/intestines, bleeding in the brain), blood vessel disorders (such as aneurysms), recent major injury/surgery, liver disease, alcohol use, mental/mood disorders (including memory problems), frequent falls/injuries. It is important that all your doctors and dentists know that you take warfarin. Before having surgery or any medical/dental procedures, tell your doctor or dentist that you are taking this medication and about all the products you use (including prescription drugs, nonprescription drugs, and herbal products). Avoid getting injections into the muscles. If you must have an injection into a muscle (for example, a flu shot), it should be given in the arm. This way, it will be easier to check for bleeding and/or apply pressure bandages. This medication may cause stomach bleeding. Daily use of alcohol while using this medicine will increase your risk for stomach bleeding and may also affect how this medication works. Limit or avoid alcoholic beverages. If you have not been eating well, if you have an illness or infection that causes fever, vomiting, or diarrhea for more than 2 days, or if you start using any antibiotic medications, contact your doctor or pharmacist immediately because these conditions can affect how warfarin works. This medication can cause heavy bleeding. To lower the chance of getting cut, bruised, or injured, use great caution with sharp objects like safety razors and nail cutters. Use an electric razor when  shaving and a soft toothbrush when brushing your teeth. Avoid activities such as contact sports. If you fall or injure yourself, especially if you hit your head, call your doctor immediately. Your doctor may need to check you. The Food & Drug Administration has stated that generic warfarin products are interchangeable. However, consult your doctor or pharmacist before switching warfarin products. Be careful not to take more than one medication that contains warfarin unless specifically directed by the doctor or health care provider who is monitoring your warfarin treatment. Older adults may be at greater risk for bleeding while using this drug. This medication is not recommended for use during pregnancy because of serious (possibly fatal) harm to an unborn baby. Discuss the use of reliable forms of birth control with your doctor. If you become pregnant or think you may be pregnant, tell your doctor immediately. If you are planning pregnancy, discuss a plan for managing your condition with your doctor before you become pregnant. Your doctor may switch the type of medication you use during pregnancy. Very small amounts of this medication may pass into breast milk but is unlikely to harm a nursing infant. Consult your doctor before breast-feeding.      DRUG INTERACTIONS:  Drug interactions may change how your medications work or increase your risk for serious side effects. This document does not contain all possible drug interactions. Keep a list of all the products you use (including prescription/nonprescription drugs and herbal products) and share it with your doctor and pharmacist. Do not start, stop, or change the dosage of any medicines without your doctor's approval. Warfarin interacts with many prescription, nonprescription, vitamin, and herbal products. This includes medications that are applied to the skin or inside the vagina or rectum. The interactions with warfarin usually result in an increase or decrease  "in the \"blood-thinning\" (anticoagulant) effect. Your doctor or other health care professional should closely monitor you to prevent serious bleeding or clotting problems. While taking warfarin, it is very important to tell your doctor or pharmacist of any changes in medications, vitamins, or herbal products that you are taking. Some products that may interact with this drug include: capecitabine, imatinib, mifepristone. Aspirin, aspirin-like drugs (salicylates), and nonsteroidal anti-inflammatory drugs (NSAIDs such as ibuprofen, naproxen, celecoxib) may have effects similar to warfarin. These drugs may increase the risk of bleeding problems if taken during treatment with warfarin. Carefully check all prescription/nonprescription product labels (including drugs applied to the skin such as pain-relieving creams) since the products may contain NSAIDs or salicylates. Talk to your doctor about using a different medication (such as acetaminophen) to treat pain/fever. Low-dose aspirin and related drugs (such as clopidogrel, ticlopidine) should be continued if prescribed by your doctor for specific medical reasons such as heart attack or stroke prevention. Consult your doctor or pharmacist for more details. Many herbal products interact with warfarin. Tell your doctor before taking any herbal products, especially bromelains, coenzyme Q10, cranberry, danshen, dong quai, fenugreek, garlic, ginkgo biloba, ginseng, and [place]'s wort, among others. This medication may interfere with a certain laboratory test to measure theophylline levels, possibly causing false test results. Make sure laboratory personnel and all your doctors know you use this drug.      OVERDOSE:  If overdose is suspected, contact a poison control center or emergency room immediately. [place] residents can call the [place] National Poison Hotline at 1-701.138.1867. [place] residents can call a provincial poison control center. Symptoms of overdose may include: " bloody/black/tarry stools, pink/dark urine, unusual/prolonged bleeding.      NOTES:  Do not share this medication with others. Laboratory and/or medical tests (such as INR, complete blood count) must be performed periodically to monitor your progress or check for side effects. Consult your doctor for more details.      MISSED DOSE:  For the best possible benefit, do not miss any doses. If you do miss a dose and remember on the same day, take it as soon as you remember. If you remember on the next day, skip the missed dose and resume your usual dosing schedule. Do not double the dose to catch up because this could increase your risk for bleeding. Keep a record of missed doses to give to your doctor or pharmacist. Contact your doctor or pharmacist if you miss 2 or more doses in a row.      STORAGE:  Store at room temperature away from light and moisture. Do not store in the bathroom. Keep all medications away from children and pets. Do not flush medications down the toilet or pour them into a drain unless instructed to do so. Properly discard this product when it is  or no longer needed. Consult your pharmacist or local waste disposal company for more details about how to safely discard your product.      MEDICAL ALERT:  Your condition and medication can cause complications in a medical emergency. For information about enrolling in MedicAlert, call 1-141.667.2271 ([place]) or 1-146.178.6808 ([place]).      Information last revised 2010 Copyright(c)  [employer]             · Is patient Post Blood Transfusion?  No    Depression / Suicide Risk    As you are discharged from this [facility] facility, it is important to learn how to keep safe from harming yourself.    Recognize the warning signs:  · Abrupt changes in personality, positive or negative- including increase in energy   · Giving away possessions  · Change in eating patterns- significant weight changes-  positive or negative  · Change in  sleeping patterns- unable to sleep or sleeping all the time   · Unwillingness or inability to communicate  · Depression  · Unusual sadness, discouragement and loneliness  · Talk of wanting to die  · Neglect of personal appearance   · Rebelliousness- reckless behavior  · Withdrawal from people/activities they love  · Confusion- inability to concentrate     If you or a loved one observes any of these behaviors or has concerns about self-harm, here's what you can do:  · Talk about it- your feelings and reasons for harming yourself  · Remove any means that you might use to hurt yourself (examples: pills, rope, extension cords, firearm)  · Get professional help from the community (Mental Health, Substance Abuse, psychological counseling)  · Do not be alone:Call your Safe Contact- someone whom you trust who will be there for you.  · Call your local CRISIS HOTLINE 485-6472 or 455-790-1496  · Call your local Children's Mobile Crisis Response Team [place] (584) 774-5906 or www.HERMEL DELOR  · Call the toll free National Suicide Prevention Hotlines   · National Suicide Prevention Lifeline 980-929-CAQB (7960)  · National Hope Line Network 800-SUICIDE (212-5419)    Oxycodone tablets or capsules  What is this medicine?  OXYCODONE (ox i KOE done) is a pain reliever. It is used to treat moderate to severe pain.  This medicine may be used for other purposes; ask your health care provider or pharmacist if you have questions.  COMMON BRAND NAME(S): Dazidox , Endocodone , OXECTA, OxyIR, Percolone, Roxicodone  What should I tell my health care provider before I take this medicine?  They need to know if you have any of these conditions:  -Zirconia's disease  -brain tumor  -drug abuse or addiction  -head injury  -heart disease  -if you frequently drink alcohol containing drinks  -kidney disease or problems going to the bathroom  -liver disease  -lung disease, asthma, or breathing problems  -mental problems  -an unusual or  allergic reaction to oxycodone, codeine, hydrocodone, morphine, other medicines, foods, dyes, or preservatives  -pregnant or trying to get pregnant  -breast-feeding  How should I use this medicine?  Take this medicine by mouth with a glass of water. Follow the directions on the prescription label. You can take it with or without food. If it upsets your stomach, take it with food. Take your medicine at regular intervals. Do not take it more often than directed. Do not stop taking except on your doctor's advice.  Some brands of this medicine, like Oxecta, have special instructions. Ask your doctor or pharmacist if these directions are for you: Do not cut, crush or chew this medicine. Swallow only one tablet at a time. Do not wet, soak, or lick the tablet before you take it.  Talk to your pediatrician regarding the use of this medicine in children. Special care may be needed.  Overdosage: If you think you have taken too much of this medicine contact a poison control center or emergency room at once.  NOTE: This medicine is only for you. Do not share this medicine with others.  What if I miss a dose?  If you miss a dose, take it as soon as you can. If it is almost time for your next dose, take only that dose. Do not take double or extra doses.  What may interact with this medicine?  -alcohol  -antihistamines  -certain medicines used for nausea like chlorpromazine, droperidol  -erythromycin  -ketoconazole  -medicines for depression, anxiety, or psychotic disturbances  -medicines for sleep  -muscle relaxants  -naloxone  -naltrexone  -narcotic medicines (opiates) for pain  -nilotinib  -phenobarbital  -phenytoin  -rifampin  -ritonavir  -voriconazole  This list may not describe all possible interactions. Give your health care provider a list of all the medicines, herbs, non-prescription drugs, or dietary supplements you use. Also tell them if you smoke, drink alcohol, or use illegal drugs. Some items may interact with your  medicine.  What should I watch for while using this medicine?  Tell your doctor or health care professional if your pain does not go away, if it gets worse, or if you have new or a different type of pain. You may develop tolerance to the medicine. Tolerance means that you will need a higher dose of the medicine for pain relief. Tolerance is normal and is expected if you take this medicine for a long time.  Do not suddenly stop taking your medicine because you may develop a severe reaction. Your body becomes used to the medicine. This does NOT mean you are addicted. Addiction is a behavior related to getting and using a drug for a non-medical reason. If you have pain, you have a medical reason to take pain medicine. Your doctor will tell you how much medicine to take. If your doctor wants you to stop the medicine, the dose will be slowly lowered over time to avoid any side effects.  You may get drowsy or dizzy when you first start taking this medicine or change doses. Do not drive, use machinery, or do anything that may be dangerous until you know how the medicine affects you. Stand or sit up slowly.  There are different types of narcotic medicines (opiates) for pain. If you take more than one type at the same time, you may have more side effects. Give your health care provider a list of all medicines you use. Your doctor will tell you how much medicine to take. Do not take more medicine than directed. Call emergency for help if you have problems breathing.  This medicine will cause constipation. Try to have a bowel movement at least every 2 to 3 days. If you do not have a bowel movement for 3 days, call your doctor or health care professional.  Your mouth may get dry. Drinking water, chewing sugarless gum, or sucking on hard candy may help. See your dentist every 6 months.  What side effects may I notice from receiving this medicine?  Side effects that you should report to your doctor or health care professional as  soon as possible:  -allergic reactions like skin rash, itching or hives, swelling of the face, lips, or tongue  -breathing problems  -confusion  -feeling faint or lightheaded, falls  -trouble passing urine or change in the amount of urine  -unusually weak or tired  Side effects that usually do not require medical attention (report to your doctor or health care professional if they continue or are bothersome):  -constipation  -dry mouth  -itching  -nausea, vomiting  -upset stomach  This list may not describe all possible side effects. Call your doctor for medical advice about side effects. You may report side effects to FDA at 1-620-FDA-1572.  Where should I keep my medicine?  Keep out of the reach of children. This medicine can be abused. Keep your medicine in a safe place to protect it from theft. Do not share this medicine with anyone. Selling or giving away this medicine is dangerous and against the law.  Store at room temperature between 15 and 30 degrees C (59 and 86 degrees F). Protect from light. Keep container tightly closed.   Discard unused medicine and used packaging carefully. Pets and children can be harmed if they find used or lost packages. Flush any unused medicines down the toilet. Do not use the medicine after the expiration date.  NOTE: This sheet is a summary. It may not cover all possible information. If you have questions about this medicine, talk to your doctor, pharmacist, or health care provider.  © 2014, [employer]. (11/15/2012 8:33:37 AM)      Your appointments     Mar 13, 2017  1:30 PM   [facility] Patient with [facility]   [facility]    [address]  [address]  [place] [address]   [phone]           Please bring Photo ID, Insurance Cards, All Medication Bottles and copies of any legal documents (such as Living Will, Power of ) If speaking a language besides English please bring an adult .  Please arrive 30 minutes prior for check in and registration. You will be receiving a confirmation call a few days before your appointment from our automated call confirmation system.            Mar 14, 2017  1:40 PM   New Patient with TRAVON Whitfield   [facility] (--)    [address], [address]  [place] [address]   [phone]           Please bring Photo ID, Insurance Cards, All Medication Bottles and copies of any legal documents (such as Living Will, Power of ) If speaking a language besides English please bring an adult . Please arrive 30 minutes prior for check in and registration. You will be receiving a confirmation call a few days before your appointment from our automated call confirmation system.              Follow-up Information     1. Follow up with Serafin Fitzgerald M.D. In 2 weeks.    Specialty:  Orthopaedics    Contact information    [address] [facility] [address]  [place] [address]  [phone]           Discharge Medication Instructions:    Below are the medications your physician expects you to take upon discharge:    Review all your home medications and newly ordered medications with your doctor and/or pharmacist. Follow medication instructions as directed by your doctor and/or pharmacist.    Please keep your medication list with you and share with your physician.               Medication List      START taking these medications        Instructions    Oxycodone HCl 20 MG Tabs   Last time this was given:  20 mg on 3/10/2017  2:20 PM   Next Dose Due:  3/10/2017 5:20pm    Take 1 Tab by mouth every 3 hours as needed for Moderate Pain or Severe Pain ((4-6)).   Dose:  20 mg       warfarin 5 MG Tabs   Last time this was given:  5 mg on 3/8/2017  6:25 PM   Commonly known as:  COUMADIN   Next Dose Due:  Do not take until seen at coumadin clinic on Monday.    Take 1 Tab by mouth COUMADIN-DAILY.   Dose:  5 mg         CONTINUE taking these medications         Instructions    alendronate 70 MG Tabs   Commonly known as:  FOSAMAX   Next Dose Due:  3/10/2017    Take 70 mg by mouth every Friday.   Dose:  70 mg       alprazolam 0.5 MG Tabs   Last time this was given:  1 mg on 3/9/2017  6:59 PM   Commonly known as:  XANAX   Next Dose Due:  As prescribed    Doctor's comments:  The earliest date the pharmacy may fill fill the rx is 2/16/2017.   Take 1/2  to 1 tablet by mouth twice as needed for anxiety. One month supply.       capsaicin 0.025 % cream   Commonly known as:  ZOSTRIX   Next Dose Due:  As prescribed    Apply 1 Each to affected area(s) 3 times a day as needed.   Dose:  1 Each       citalopram 40 MG Tabs   Last time this was given:  40 mg on 3/10/2017  8:04 AM   Commonly known as:  CELEXA   Next Dose Due:  3/11/2017    TAKE 1 TABLET BY MOUTH DAILY       furosemide 20 MG Tabs   Commonly known as:  LASIX   Next Dose Due:  As prescribed    Take 20 mg by mouth 2 times a day as needed. Indications: Edema   Dose:  20 mg       gabapentin 400 MG Caps   Last time this was given:  400 mg on 3/10/2017  2:21 PM   Commonly known as:  NEURONTIN   Next Dose Due:  3/10/2017 9pm    Doctor's comments:  Fill at monthly intervals or greater.   Take 1 Cap by mouth 3 times a day. as needed for nerve pain.   Dose:  400 mg       hydrOXYzine 25 MG Tabs   Commonly known as:  ATARAX   Next Dose Due:  As prescribed    Doctor's comments:  Tapering patient off of xanax/benzodiazepines.   Take 1 Tab by mouth 3 times a day as needed for Anxiety.   Dose:  25 mg       morphine ER 60 MG Tbcr tablet   Last time this was given:  60 mg on 3/10/2017  8:04 AM   Commonly known as:  MS CONTIN   Next Dose Due:  3/10/2017 9pm    Doctor's comments:  The earliest date the pharmacy may fill fill the rx is 2/16/2017.   Take 1 tablet by mouth twice per day as needed for pain.       NICOTINE STEP 1 21 MG/24HR Pt24   Last time this was given:  1 Patch on 3/10/2017  6:30 AM   Generic drug:  nicotine   Next Dose Due:   3/11/2017 6am    APPLY 1 PATCH BY TRANSDERMAL ROUTE DAILY       potassium chloride SA 10 MEQ Tbcr   Commonly known as:  K-DUR    Take 10 mEq by mouth 2 times a day as needed (with lasix).   Dose:  10 mEq       * prazosin 2 MG Caps   Commonly known as:  MINIPRESS   Notes to Patient:  As prescribed    Take 2 mg by mouth every evening.   Dose:  2 mg       * prazosin 1 MG Caps   Commonly known as:  MINIPRESS   Next Dose Due:  As prescribed    Take 1 mg by mouth every evening.   Dose:  1 mg       STOOL SOFTENER 100 MG Tabs   Generic drug:  Docusate Sodium   Next Dose Due:  3/10/2017 9pm    Take 1 Tab by mouth 2 Times a Day.   Dose:  1 Tab       tizanidine 4 MG Tabs   Last time this was given:  4 mg on 3/9/2017  8:13 AM   Commonly known as:  ZANAFLEX   Next Dose Due:  As prescribed    Doctor's comments:  Replaces prior tizanidine rx.   Take 1/2 to 1 tablet by mouth every 6 hours as needed for muscle spasms.       trazodone 50 MG Tabs   Last time this was given:  50 mg on 3/8/2017  8:42 PM   Commonly known as:  DESYREL   Next Dose Due:  As prescribed    Doctor's comments:  Fill at monthly intervals or greater.   Take one tablet by mouth at bedtime as needed for insomnia.       * Notice:  This list has 2 medication(s) that are the same as other medications prescribed for you. Read the directions carefully, and ask your doctor or other care provider to review them with you.      STOP taking these medications     HYDROmorphone 4 MG Tabs   Commonly known as:  DILAUDID               Instructions           Diet / Nutrition:    Follow any diet instructions given to you by your doctor or the dietician, including how much salt (sodium) you are allowed each day.    If you are overweight, talk to your doctor about a weight reduction plan.    Activity:    Remain physically active following your doctor's instructions about exercise and activity.    Rest often.     Any time you become even a little tired or short of breath, SIT DOWN and  rest.    Worsening Symptoms:    Report any of the following signs and symptoms to the doctor's office immediately:    *Pain of jaw, arm, or neck  *Chest pain not relieved by medication                               *Dizziness or loss of consciousness  *Difficulty breathing even when at rest   *More tired than usual                                       *Bleeding drainage or swelling of surgical site  *Swelling of feet, ankles, legs or stomach                 *Fever (>100ºF)  *Pink or blood tinged sputum  *Weight gain (3lbs/day or 5lbs /week)           *Shock from internal defibrillator (if applicable)  *Palpitations or irregular heartbeats                *Cool and/or numb extremities    Stroke Awareness    Common Risk Factors for Stroke include:    Age  Atrial Fibrillation  Carotid Artery Stenosis  Diabetes Mellitus  Excessive alcohol consumption  High blood pressure  Overweight   Physical inactivity  Smoking    Warning signs and symptoms of a stroke include:    *Sudden numbness or weakness of the face, arm or leg (especially on one side of the body).  *Sudden confusion, trouble speaking or understanding.  *Sudden trouble seeing in one or both eyes.  *Sudden trouble walking, dizziness, loss of balance or coordination.Sudden severe headache with no known cause.    It is very important to get treatment quickly when a stroke occurs. If you experience any of the above warning signs, call 911 immediately.                   Disclaimer         Quit Smoking / Tobacco Use:    I understand the use of any tobacco products increases my chance of suffering from future heart disease or stroke and could cause other illnesses which may shorten my life. Quitting the use of tobacco products is the single most important thing I can do to improve my health. For further information on smoking / tobacco cessation call a Toll Free Quit Line at 1-578.789.5802 (*National Cancer [place]) or 1-361.961.2202 (American Lung Association) or you  can access the web based program at www.lungusa.org.    [place] Tobacco Users Help Line:  (488) 597-9510       Toll Free: 1-543.655.1482  Quit Tobacco Program [facility] ([phone]    Crisis Hotline:    [place] Crisis Hotline:  7-204-ZZAINPX or 1-399.385.2481    [place] Crisis Hotline:    1-583.853.1250 or 372-461-5909    Discharge Survey:   Thank you for choosing [facility]. We hope we did everything we could to make your hospital stay a pleasant one. You may be receiving a phone survey and we would appreciate your time and participation in answering the questions. Your input is very valuable to us in our efforts to improve our service to our patients and their families.        My signature on this form indicates that:    1. I have reviewed and understand the above information.  2. My questions regarding this information have been answered to my satisfaction.  3. I have formulated a plan with my discharge nurse to obtain my prescribed medications for home.                  Disclaimer         __________________________________                     __________       ________                       Patient Signature                                                 Date                    Time

## 2017-03-07 NOTE — OR SURGEON
Immediate Post-Operative Note      PreOp Diagnosis: L hip oa djd    PostOp Diagnosis: same    Procedure(s):  HIP ARTHROPLASTY TOTAL - Wound Class: Clean    Surgeon(s):  YANETH Stanley M.D.    Anesthesiologist/Type of Anesthesia:  Anesthesiologist: Mima Good M.D./General    Surgical Staff:  Circulator: Timur Andrade R.N.  Limb [place]rellana: Jewell Gamboa Circulator: Ghazal Escamilla R.N.  Scrub Person: Valerie Segal    Specimen: none    Estimated Blood Loss: 300cc    Findings: none    Complications: none        3/7/2017 9:00 AM Serafin Fitzgerald

## 2017-03-07 NOTE — OP REPORT
DATE OF SERVICE:  03/07/2017    SERVICE:  Orthopedic surgery.    PREOPERATIVE DIAGNOSES:  Left hip severe end-stage degenerative joint   disease, osteoarthritis.    POSTOPERATIVE DIAGNOSES:  Left hip severe end-stage degenerative joint   disease, osteoarthritis.    PROCEDURE:  Left total hip arthroplasty using Smith and Nephew press-fit   Oxinium knee with a size 56 acetabular component, a 40 mm Oxinium head with +0   offset, a size 14 press-fit Synergy standard offset femoral stem, and a   10-degree posterior lipped polyethylene liner.    SURGEON:  Serafin Fitzgerald MD    ASSISTANT SURGEON:  Ian Mcnair MD    ANESTHETIC:  General.    ANESTHESIOLOGIST:  Mima Good MD    COMPLICATIONS:  None.    BLOOD LOSS:  300 mL    DESCRIPTION OF PROCEDURE:  After informed consent was obtained, the patient   was brought to the operating room and given general anesthetic.  She was   placed in lateral decubitus position with the left side up and given   preoperative IV Ancef, vancomycin, and tranexamic acid.  After the field was   draped, a time-out was called correctly identifying the patient and the   procedure to be done.  We then made a longitudinal curvilinear incision   standard for the posterior approach to the hip.  We then carefully dissected   down through subcutaneous tissue to the fascial layer.  The fascia was then   carefully cleaned off with a Muller elevator.  We incised the fascia distally   and carried this incision proximally in a curvilinear fashion.  We then placed   the deep Charnley [place] retractor.  Piriformis bursa was then removed and   the piriformis and conjoined tendon were identified, tagged, and reattached   at the end of the operation.  We then carefully removed the posterior aspect   of the capsule.  The hip joint was then dislocated.  We then made a femoral   neck cut with the oscillating saw approximately 1 fingerbreadth proximal to   the lesser trochanter.  The proximal femur was  skeletonized down to the lesser   trochanter.    We then made a rent in the anterior capsule of the acetabulum and placed a   Hohmann retractor.  We also placed a distal posterior Hohmann retractor as   well.  Any capsular tissue potentially interfering with our dissection was   removed.  We then used sequentially larger reamers and reamed up to a size 56.    We had excellent circumferential bleeding bone and had reamed down to the   floor of the acetabular fossa.  A size 56 acetabular component was then placed   with blows from the mallet.  We then placed maximal axial and rotatory forces   on the prosthesis to make sure it was secure, which it was.  A screw was not   needed secondary to excellent interference fit.  A posterior lipped   polyethylene liner was then placed with blows from the mallet.  Kocher   traction was placed on this polyethylene component to make sure it was indeed   secure, which it was.    We then turned our attention to preparing the proximal femur.  Cookie cutter   was used.  We then used sequentially larger cylindrical reamers.  We then used   sequentially larger broaches.  We broached up to a size 14.  The calcar   reamer was then used.  We then trialed.  All trial components were then   removed.  A size 14 standard offset press-fit femoral component from Smith and   Nephew was then placed with blows from the mallet.    We then once again trialed.  We then carefully cleaned and dried the Jain   taper and then placed a size 40 mm +0 offset Oxinium femoral head with blows   from the mallet.  Digital traction was placed on the femoral head and it was   noted to be secure.  The hip was then reduced and taken through range of   motion.  The patient was stable to maximal internal and external rotation and   full extension.  There was approximately 2-3 mm of shuck on axial traction.    At 45 degrees of flexion, she rotated to 90 degrees of internal disk rotation   before starting to dislocate.   At 90 degrees of flexion, she rotated to 80   degrees of internal rotation before starting to dislocate.  We noted the hip   to be exceptionally stable.  After irrigating with copious amounts of   irrigation with pulsatile lavage, we then closed the wound in layers.  We also   did a Betadine wash before closure.  Vancomycin powder 1000 mg was then   distributed equally below and above the fascia.  The fascial layer was closed   with #1 Vicryl suture.  Because of the fact that the wound was exceptionally   dry, a Hemovac was not placed.  After placing a #1 Vicryl in the fascial   layer, we then completed the wound closure with fat layer stitches,   subcutaneous 2-0 Vicryl, and the skin was closed with staples.  A 20 mL of   0.5% Marcaine with epinephrine was injected into the wound, which was then   dressed.  The procedure was terminated.  No complications were experienced.    Blood loss was approximately 300 mL.  The patient was aroused from general   anesthesia and brought in stable condition to recovery room.       ____________________________________     MD LILIANA RUELAS / MASON    DD:  03/07/2017 09:09:19  DT:  03/07/2017 09:36:17    D#:  [id number]  Job#:  [id number]

## 2017-03-07 NOTE — PROGRESS NOTES
Inpatient Anticoagulation Service Note    Date: 3/7/2017  Reason for Anticoagulation: Orthopedic Prophylaxis        Target INR: 2.0 to 2.5    INR from last 7 days     None        Dose from last 7 days     Date/Time Dose (mg)    03/07/17 1500 5        Bridge Therapy: No          Plan:  Initiate warfarin dosing with warfarin 5mg daily.  Pharmacy to continue to monitor and adjust warfarin per protocol.     Pharmacist suggested discharge dosing: BLANQUITA Crisostomo, PharmD

## 2017-03-08 LAB
ANION GAP SERPL CALC-SCNC: 5 MMOL/L (ref 0–11.9)
BUN SERPL-MCNC: 17 MG/DL (ref 8–22)
CALCIUM SERPL-MCNC: 8.4 MG/DL (ref 8.5–10.5)
CHLORIDE SERPL-SCNC: 105 MMOL/L (ref 96–112)
CO2 SERPL-SCNC: 31 MMOL/L (ref 20–33)
CREAT SERPL-MCNC: 0.71 MG/DL (ref 0.5–1.4)
ERYTHROCYTE [DISTWIDTH] IN BLOOD BY AUTOMATED COUNT: 47 FL (ref 35.9–50)
GFR SERPL CREATININE-BSD FRML MDRD: >60 ML/MIN/1.73 M 2
GLUCOSE SERPL-MCNC: 136 MG/DL (ref 65–99)
HCT VFR BLD AUTO: 27.5 % (ref 37–47)
HGB BLD-MCNC: 9.3 G/DL (ref 12–16)
INR PPP: 1.31 (ref 0.87–1.13)
MCH RBC QN AUTO: 30.4 PG (ref 27–33)
MCHC RBC AUTO-ENTMCNC: 33.8 G/DL (ref 33.6–35)
MCV RBC AUTO: 89.9 FL (ref 81.4–97.8)
PLATELET # BLD AUTO: 135 K/UL (ref 164–446)
PMV BLD AUTO: 9.9 FL (ref 9–12.9)
POTASSIUM SERPL-SCNC: 4.2 MMOL/L (ref 3.6–5.5)
PROTHROMBIN TIME: 16.7 SEC (ref 12–14.6)
RBC # BLD AUTO: 3.06 M/UL (ref 4.2–5.4)
SODIUM SERPL-SCNC: 141 MMOL/L (ref 135–145)
WBC # BLD AUTO: 4.6 K/UL (ref 4.8–10.8)

## 2017-03-08 PROCEDURE — 3E0234Z INTRODUCTION OF SERUM, TOXOID AND VACCINE INTO MUSCLE, PERCUTANEOUS APPROACH: ICD-10-PCS | Performed by: ORTHOPAEDIC SURGERY

## 2017-03-08 PROCEDURE — 97162 PT EVAL MOD COMPLEX 30 MIN: CPT

## 2017-03-08 PROCEDURE — 700111 HCHG RX REV CODE 636 W/ 250 OVERRIDE (IP): Performed by: ORTHOPAEDIC SURGERY

## 2017-03-08 PROCEDURE — 90686 IIV4 VACC NO PRSV 0.5 ML IM: CPT | Performed by: ORTHOPAEDIC SURGERY

## 2017-03-08 PROCEDURE — 90471 IMMUNIZATION ADMIN: CPT

## 2017-03-08 PROCEDURE — 700102 HCHG RX REV CODE 250 W/ 637 OVERRIDE(OP): Performed by: PHARMACIST

## 2017-03-08 PROCEDURE — 97116 GAIT TRAINING THERAPY: CPT

## 2017-03-08 PROCEDURE — 700112 HCHG RX REV CODE 229: Performed by: ORTHOPAEDIC SURGERY

## 2017-03-08 PROCEDURE — 85610 PROTHROMBIN TIME: CPT

## 2017-03-08 PROCEDURE — 97166 OT EVAL MOD COMPLEX 45 MIN: CPT

## 2017-03-08 PROCEDURE — G8987 SELF CARE CURRENT STATUS: HCPCS | Mod: CK

## 2017-03-08 PROCEDURE — A9270 NON-COVERED ITEM OR SERVICE: HCPCS | Performed by: ORTHOPAEDIC SURGERY

## 2017-03-08 PROCEDURE — 80048 BASIC METABOLIC PNL TOTAL CA: CPT

## 2017-03-08 PROCEDURE — 85027 COMPLETE CBC AUTOMATED: CPT

## 2017-03-08 PROCEDURE — G8988 SELF CARE GOAL STATUS: HCPCS | Mod: CI

## 2017-03-08 PROCEDURE — G8979 MOBILITY GOAL STATUS: HCPCS | Mod: CI

## 2017-03-08 PROCEDURE — G8978 MOBILITY CURRENT STATUS: HCPCS | Mod: CK

## 2017-03-08 PROCEDURE — A9270 NON-COVERED ITEM OR SERVICE: HCPCS | Performed by: PHARMACIST

## 2017-03-08 PROCEDURE — 36415 COLL VENOUS BLD VENIPUNCTURE: CPT

## 2017-03-08 PROCEDURE — 700102 HCHG RX REV CODE 250 W/ 637 OVERRIDE(OP): Performed by: ORTHOPAEDIC SURGERY

## 2017-03-08 PROCEDURE — 97530 THERAPEUTIC ACTIVITIES: CPT

## 2017-03-08 PROCEDURE — 700101 HCHG RX REV CODE 250: Performed by: ORTHOPAEDIC SURGERY

## 2017-03-08 PROCEDURE — 770006 HCHG ROOM/CARE - MED/SURG/GYN SEMI*

## 2017-03-08 RX ORDER — ALPRAZOLAM 1 MG/1
1 TABLET ORAL 4 TIMES DAILY PRN
Status: DISCONTINUED | OUTPATIENT
Start: 2017-03-08 | End: 2017-03-10 | Stop reason: HOSPADM

## 2017-03-08 RX ORDER — DEXAMETHASONE SODIUM PHOSPHATE 4 MG/ML
4 INJECTION, SOLUTION INTRA-ARTICULAR; INTRALESIONAL; INTRAMUSCULAR; INTRAVENOUS; SOFT TISSUE
Status: COMPLETED | OUTPATIENT
Start: 2017-03-08 | End: 2017-03-08

## 2017-03-08 RX ORDER — ALPRAZOLAM 1 MG/1
.5-1 TABLET ORAL 2 TIMES DAILY PRN
Status: DISCONTINUED | OUTPATIENT
Start: 2017-03-08 | End: 2017-03-08

## 2017-03-08 RX ORDER — OXYCODONE HYDROCHLORIDE 10 MG/1
20 TABLET ORAL
Status: DISCONTINUED | OUTPATIENT
Start: 2017-03-08 | End: 2017-03-10 | Stop reason: HOSPADM

## 2017-03-08 RX ORDER — MORPHINE SULFATE 10 MG/ML
6 INJECTION, SOLUTION INTRAMUSCULAR; INTRAVENOUS
Status: DISCONTINUED | OUTPATIENT
Start: 2017-03-08 | End: 2017-03-10 | Stop reason: HOSPADM

## 2017-03-08 RX ORDER — TIZANIDINE 4 MG/1
4 TABLET ORAL EVERY 6 HOURS PRN
Status: DISCONTINUED | OUTPATIENT
Start: 2017-03-08 | End: 2017-03-10 | Stop reason: HOSPADM

## 2017-03-08 RX ADMIN — POTASSIUM CHLORIDE, DEXTROSE MONOHYDRATE AND SODIUM CHLORIDE: 150; 5; 450 INJECTION, SOLUTION INTRAVENOUS at 10:10

## 2017-03-08 RX ADMIN — WARFARIN SODIUM 5 MG: 5 TABLET ORAL at 18:25

## 2017-03-08 RX ADMIN — ALPRAZOLAM 1 MG: 1 TABLET ORAL at 13:38

## 2017-03-08 RX ADMIN — KETOROLAC TROMETHAMINE 30 MG: 30 INJECTION, SOLUTION INTRAMUSCULAR; INTRAVENOUS at 02:13

## 2017-03-08 RX ADMIN — ALPRAZOLAM 1 MG: 1 TABLET ORAL at 20:42

## 2017-03-08 RX ADMIN — TIZANIDINE 4 MG: 4 TABLET ORAL at 10:52

## 2017-03-08 RX ADMIN — MORPHINE SULFATE 60 MG: 60 TABLET, EXTENDED RELEASE ORAL at 20:42

## 2017-03-08 RX ADMIN — GABAPENTIN 400 MG: 400 CAPSULE ORAL at 14:53

## 2017-03-08 RX ADMIN — CITALOPRAM HYDROBROMIDE 40 MG: 20 TABLET ORAL at 10:02

## 2017-03-08 RX ADMIN — NICOTINE 1 PATCH: 21 PATCH TRANSDERMAL at 06:30

## 2017-03-08 RX ADMIN — DOCUSATE SODIUM 100 MG: 100 CAPSULE ORAL at 20:42

## 2017-03-08 RX ADMIN — INFLUENZA A VIRUS A/CALIFORNIA/7/2009 X-179A (H1N1) ANTIGEN (FORMALDEHYDE INACTIVATED), INFLUENZA A VIRUS A/HONG KONG/4801/2014 X-263B (H3N2) ANTIGEN (FORMALDEHYDE INACTIVATED), INFLUENZA B VIRUS B/PHUKET/3073/2013 ANTIGEN (FORMALDEHYDE INACTIVATED), AND INFLUENZA B VIRUS B/BRISBANE/60/2008 ANTIGEN (FORMALDEHYDE INACTIVATED) 0.5 ML: 15; 15; 15; 15 INJECTION, SUSPENSION INTRAMUSCULAR at 10:03

## 2017-03-08 RX ADMIN — DOCUSATE SODIUM 100 MG: 100 CAPSULE ORAL at 10:02

## 2017-03-08 RX ADMIN — MORPHINE SULFATE 6 MG: 10 INJECTION INTRAVENOUS at 13:38

## 2017-03-08 RX ADMIN — HYDROMORPHONE HYDROCHLORIDE 4 MG: 4 TABLET ORAL at 00:00

## 2017-03-08 RX ADMIN — CEFAZOLIN SODIUM 2 G: 2 INJECTION, SOLUTION INTRAVENOUS at 00:00

## 2017-03-08 RX ADMIN — HYDROMORPHONE HYDROCHLORIDE 4 MG: 4 TABLET ORAL at 03:52

## 2017-03-08 RX ADMIN — GABAPENTIN 400 MG: 400 CAPSULE ORAL at 10:02

## 2017-03-08 RX ADMIN — MORPHINE SULFATE 6 MG: 10 INJECTION INTRAVENOUS at 23:40

## 2017-03-08 RX ADMIN — HYDROMORPHONE HYDROCHLORIDE 4 MG: 4 TABLET ORAL at 10:52

## 2017-03-08 RX ADMIN — HYDROMORPHONE HYDROCHLORIDE 4 MG: 4 TABLET ORAL at 06:48

## 2017-03-08 RX ADMIN — OXYCODONE HYDROCHLORIDE 20 MG: 10 TABLET ORAL at 20:42

## 2017-03-08 RX ADMIN — MORPHINE SULFATE 6 MG: 10 INJECTION INTRAVENOUS at 18:25

## 2017-03-08 RX ADMIN — TIZANIDINE 4 MG: 4 TABLET ORAL at 02:16

## 2017-03-08 RX ADMIN — MORPHINE SULFATE 60 MG: 60 TABLET, EXTENDED RELEASE ORAL at 10:02

## 2017-03-08 RX ADMIN — TRAZODONE HYDROCHLORIDE 50 MG: 50 TABLET ORAL at 20:42

## 2017-03-08 RX ADMIN — POTASSIUM CHLORIDE, DEXTROSE MONOHYDRATE AND SODIUM CHLORIDE: 150; 5; 450 INJECTION, SOLUTION INTRAVENOUS at 00:00

## 2017-03-08 RX ADMIN — GABAPENTIN 400 MG: 400 CAPSULE ORAL at 20:43

## 2017-03-08 RX ADMIN — DOCUSATE SODIUM 100 MG: 100 CAPSULE ORAL at 18:25

## 2017-03-08 RX ADMIN — DOCUSATE SODIUM 100 MG: 100 CAPSULE ORAL at 13:38

## 2017-03-08 RX ADMIN — STANDARDIZED SENNA CONCENTRATE AND DOCUSATE SODIUM 1 TABLET: 8.6; 5 TABLET, FILM COATED ORAL at 20:42

## 2017-03-08 RX ADMIN — KETOROLAC TROMETHAMINE 30 MG: 30 INJECTION, SOLUTION INTRAMUSCULAR; INTRAVENOUS at 10:02

## 2017-03-08 RX ADMIN — POTASSIUM CHLORIDE, DEXTROSE MONOHYDRATE AND SODIUM CHLORIDE: 150; 5; 450 INJECTION, SOLUTION INTRAVENOUS at 23:41

## 2017-03-08 ASSESSMENT — GAIT ASSESSMENTS
GAIT LEVEL OF ASSIST: CONTACT GUARD ASSIST
DISTANCE (FEET): 30
GAIT LEVEL OF ASSIST: REFUSED
DEVIATION: ANTALGIC;BRADYKINETIC;SHUFFLED GAIT
ASSISTIVE DEVICE: FRONT WHEEL WALKER

## 2017-03-08 ASSESSMENT — PAIN SCALES - GENERAL
PAINLEVEL_OUTOF10: 10

## 2017-03-08 ASSESSMENT — LIFESTYLE VARIABLES
ALCOHOL_USE: NO
EVER_SMOKED: YES

## 2017-03-08 NOTE — DISCHARGE PLANNING
Pt says she is being discharged home. In notes, OT recommended. Pt lives with long term partner, Antwon. She stated she did not have a living will but then went on to say that Antwon was her POA. Plans on regular sponge baths as she is not able to get herself into the shower. Pt lives in a two story home and is concerned about getting up and down the stairs. Has a walker and cane she purchased from [employer]. Wheelchair is in a shed if she needs it. Pharmacy is [facility].       Care Transition Team Assessment    Information Source  Orientation : Oriented x 4  Information Given By: Patient  Informant's Name:  (Manisha Rincon )  Who is responsible for making decisions for patient? : Patient    Readmission Evaluation  Is this a readmission?: No    Elopement Risk  Legal Hold: No  Ambulatory or Self Mobile in Wheelchair: No-Not an Elopement Risk  Elopement Risk: Not at Risk for Elopement    Interdisciplinary Discharge Planning  Does Admitting Nurse Feel This Could be a Complex Discharge?: Yes  Primary Care Physician: Efren  Lives with - Patient's Self Care Capacity: Significant Other  Patient or legal guardian wants to designate a caregiver (see row info): No  Support Systems: Friends / Neighbors  Housing / Facility: 2 Story Apartment / Condo  Do You Take your Prescribed Medications Regularly: Yes  Able to Return to Previous ADL's: Future Time w/Therapy  Mobility Issues: Yes  Prior Services: Unable To Determine At This Time  Patient Expects to be Discharged to:: home  Assistance Needed: No  Durable Medical Equipment: Home Oxygen, Walker, Other - Specify (wheelchair)  DME Provider / Phone: [email or web address]    Discharge Preparedness  What is your plan after discharge?: Home with help  What are your discharge supports?: Partner  Prior Functional Level: Uses Cane, Uses Walker  Difficulity with ADLs: Bathing, Walking  Difficulity with IADLs: Cooking, Driving, Shopping    Functional Assesment  Prior Functional Level: Uses Cane,  Uses Walker    Finances  Financial Barriers to Discharge: Yes  Average Monthly Income: 735 $  Source of Income: Social Security Disability  Prescription Coverage: Yes    Vision / Hearing Impairment  Right Eye Vision: Wears Glasses  Left Eye Vision: Wears Glasses  Hearing Impairment : No    Values / Beliefs / Concerns  Values / Beliefs Concerns : No  Spiritual Requests During Hospitalization: No    Advance Directive  Advance Directive?: None  Advance Directive offered?: AD Booklet refused    Domestic Abuse  Have you ever been the victim of abuse or violence?: No  Physical Abuse or Sexual Abuse: No  Verbal Abuse or Emotional Abuse: No    Psychological Assessment  History of Substance Abuse: None  History of Psychiatric Problems: No  Non-compliant with Treatment: No  Newly Diagnosed Illness: Yes    Discharge Risks or Barriers  Discharge risks or barriers?: No    Anticipated Discharge Information  Anticipated discharge disposition: Discharge needs currently unknown  Discharge Address: [facility] [address]   Discharge Contact Phone Number: [phone]

## 2017-03-08 NOTE — PROGRESS NOTES
Pt accepted from PACU at 1525, a&ox4, bp 105/50, harris to dd, dressing cdi, call light within reach hourly rounding in place.

## 2017-03-08 NOTE — PROGRESS NOTES
Inpatient Anticoagulation Service Note    Date: 3/8/2017  Reason for Anticoagulation: Orthopedic Prophylaxis        Hemoglobin Value: 9.3  Hematocrit Value: 27.5 (This result has been called to [id number] by Ramya Shook on 03 08 2017 at  0631, and has been read back.  )  Lab Platelet Value: 135  Target INR: 2.0 to 2.5    INR from last 7 days     Date/Time INR Value    03/08/17 0541 (!)1.31        Dose from last 7 days     Date/Time Dose (mg)    03/08/17 1300 5    03/07/17 1500 5        Bridge Therapy: No     Comments: H/H lower post-op, MD reports 300 mL blood loss during procedure. Incision noted to be CDI, no drug interactions, will continue 5 mg daily.    Plan:  Warfarin 5 mg tonight. INR in AM.     Pharmacist suggested discharge dosing: to be determined, new start     Fer Riggs, PharmD

## 2017-03-08 NOTE — CARE PLAN
Problem: Nutritional:  Goal: Achieve adequate nutritional intake  Patient will consume >50% of meals  Outcome: MET Date Met:  03/08/17  Per chart pt PO % 2 meals recorded

## 2017-03-08 NOTE — CARE PLAN
Problem: Knowledge Deficit  Goal: Knowledge of disease process/condition, treatment plan, diagnostic tests, and medications will improve  Intervention: Assess knowledge level of disease process/condition, treatment plan, diagnostic tests, and medications  Went over POC.

## 2017-03-08 NOTE — DIETARY
Nutrition Services: Poor PO on Nutrition Admit Screen   66 year old female with admit dx of left hip pain  Past Pertinent Med Hx: hepatitis C, anxiety, clotting disorder, arthritis, pneumonia, HTN, chronic pain    Pt states she is not a fan of the food, pt doesn't like the full liquid diet. Pt states she drinks boost at home and would like to receive Boost while she is here. Discussed adding supplements with RN. Added milkshake TID with meals. Pt unsure of wt loss and reports UBW at 118-120 lbs (53.6-54.5 kg). Current wt up from UBW. Per chart pt PO % 2 meals recorded    Ht: 165.1 cm  Wt 3/7: 60.5 kg via stand up scale  BMI: 22.2  Diet: Full Liquid  Pertinent Labs: Glucose 136  Pertinent Meds: celexa, colace, neurontin, ms contin, minipress, pericolace, desyrel, coumadin, xanax (prn), toradol (prn), morphine (prn)  Fluids: dextrose 5% and 0.45% NaCl with KCl 20 mEq @ 100 ml/hr  Skin: surgical incision left hip  GI: Last BM 3/6    PLAN/RECOMMEND -   1) Nutrition rep to see patient daily for meal and snack preferences.  2) Encourage PO  3) Weekly weights to monitor fluid and nutrition status  4) Obtain supplement order per RD (Vanilla Boost Plus)    RD available prn

## 2017-03-08 NOTE — PROGRESS NOTES
Progress Note               Author: Serafin Fitzgerald Date & Time created: 3/8/2017  12:57 PM     Interval History:  Pt c/o pain    Review of Systems:  ROS    Physical Exam:  Physical Exam   Neurological:   Baseline numbness b le otherwise motor and sensation intact  Dressing dry       Labs:        Invalid input(s): [id number]      Recent Labs      17   0541   SODIUM  141   POTASSIUM  4.2   CHLORIDE  105   CO2  31   BUN  17   CREATININE  0.71   CALCIUM  8.4*     Recent Labs      17   0541   GLUCOSE  136*     Recent Labs      17   0541   RBC  3.06*   HEMOGLOBIN  9.3*   HEMATOCRIT  27.5*   PLATELETCT  135*   PROTHROMBTM  16.7*   INR  1.31*     Recent Labs      17   0541   WBC  4.6*     Hemodynamics:  Temp (24hrs), Av.7 °C (98.1 °F), Min:36.2 °C (97.2 °F), Max:37.2 °C (98.9 °F)  Temperature: 37 °C (98.6 °F)  Pulse  Av.9  Min: 60  Max: 86Heart Rate (Monitored): 69  Blood Pressure: (!) 97/59 mmHg, NIBP: 100/55 mmHg     Respiratory:    Respiration: 16, Pulse Oximetry: 96 %, O2 Daily Delivery Respiratory : Silicone Nasal Cannula           Fluids:    Intake/Output Summary (Last 24 hours) at 17 1257  Last data filed at 17 1000   Gross per 24 hour   Intake    700 ml   Output   2000 ml   Net  -1300 ml        GI/Nutrition:  Orders Placed This Encounter   Procedures   • DIET ORDER     Standing Status: Standing      Number of Occurrences: 1      Standing Expiration Date:      Order Specific Question:  Diet:     Answer:  Full Liquid [11]     Medical Decision Making, by Problem:  Active Hospital Problems    Diagnosis   • Left hip pain [M25.552]       Plan:  Add iv morphine  Oxycodone  Xanax increased  Decadron prn pain  torodol  Cont o2 monitoring  Pt/ot  Post hip precautions    Core Measures

## 2017-03-08 NOTE — THERAPY
"Occupational Therapy Evaluation completed.   Functional Status:  Pt seen for OT eval due to L hip sx. Pt quite drowsy during session. Unable to determine prior levels/housing due to poor attention. Mod A ADL transfers. Min-Mod A ADLs at this time.   Plan of Care: Will benefit from Occupational Therapy 3 times per week  Discharge Recommendations:  Equipment: Will Continue to Assess for Equipment Needs. Post-acute therapy recommended before discharged home.    See \"Rehab Therapy-Acute\" Patient Summary Report for complete documentation.    "

## 2017-03-08 NOTE — PROGRESS NOTES
Received bedside shift report from night RN. Pt AOx4.  Pt reports pain is 10/10, see MAR. Does call appropriately. Bed alarm is off.  Pt is resting in bed. PIV assessed and is patent. Pt is on 2-3 L NC she is on o2 at home. POC discussed as well as unit routine, comfort, and safety. Dicussed DC planning to home per pt. Discussed the goal for today, pain control, PT and OT. Pt has not moved and the joint coordinator, this RN, night RN, CNA and PT have all educated pt about the importance of moving her hip and pt still refuses to move due to pain. MD aware and will round today. Reviewed orders, notes, labs, and test results. Hourly rounding in place with RN rounding on odd hours and CNA on even hours.

## 2017-03-09 LAB
INR PPP: 2.72 (ref 0.87–1.13)
PROTHROMBIN TIME: 29.7 SEC (ref 12–14.6)

## 2017-03-09 PROCEDURE — 97530 THERAPEUTIC ACTIVITIES: CPT

## 2017-03-09 PROCEDURE — 36415 COLL VENOUS BLD VENIPUNCTURE: CPT

## 2017-03-09 PROCEDURE — A9270 NON-COVERED ITEM OR SERVICE: HCPCS | Performed by: ORTHOPAEDIC SURGERY

## 2017-03-09 PROCEDURE — 97535 SELF CARE MNGMENT TRAINING: CPT

## 2017-03-09 PROCEDURE — 97110 THERAPEUTIC EXERCISES: CPT

## 2017-03-09 PROCEDURE — 700111 HCHG RX REV CODE 636 W/ 250 OVERRIDE (IP): Performed by: ORTHOPAEDIC SURGERY

## 2017-03-09 PROCEDURE — 97116 GAIT TRAINING THERAPY: CPT

## 2017-03-09 PROCEDURE — 770006 HCHG ROOM/CARE - MED/SURG/GYN SEMI*

## 2017-03-09 PROCEDURE — 700102 HCHG RX REV CODE 250 W/ 637 OVERRIDE(OP): Performed by: ORTHOPAEDIC SURGERY

## 2017-03-09 PROCEDURE — 85610 PROTHROMBIN TIME: CPT

## 2017-03-09 PROCEDURE — 700105 HCHG RX REV CODE 258

## 2017-03-09 PROCEDURE — 700112 HCHG RX REV CODE 229: Performed by: ORTHOPAEDIC SURGERY

## 2017-03-09 RX ORDER — WARFARIN SODIUM 5 MG/1
5 TABLET ORAL
Qty: 30 TAB | Refills: 0 | Status: SHIPPED | OUTPATIENT
Start: 2017-03-09 | End: 2017-04-18

## 2017-03-09 RX ORDER — SODIUM CHLORIDE 9 MG/ML
INJECTION, SOLUTION INTRAVENOUS
Status: COMPLETED
Start: 2017-03-09 | End: 2017-03-09

## 2017-03-09 RX ORDER — SODIUM CHLORIDE 9 MG/ML
1000 INJECTION, SOLUTION INTRAVENOUS ONCE
Status: COMPLETED | OUTPATIENT
Start: 2017-03-09 | End: 2017-03-09

## 2017-03-09 RX ORDER — OXYCODONE HYDROCHLORIDE 20 MG/1
20 TABLET ORAL
Qty: 120 TAB | Refills: 0 | Status: SHIPPED | OUTPATIENT
Start: 2017-03-09 | End: 2021-07-31

## 2017-03-09 RX ADMIN — ALPRAZOLAM 1 MG: 1 TABLET ORAL at 06:15

## 2017-03-09 RX ADMIN — STANDARDIZED SENNA CONCENTRATE AND DOCUSATE SODIUM 1 TABLET: 8.6; 5 TABLET, FILM COATED ORAL at 22:21

## 2017-03-09 RX ADMIN — STANDARDIZED SENNA CONCENTRATE AND DOCUSATE SODIUM 1 TABLET: 8.6; 5 TABLET, FILM COATED ORAL at 22:20

## 2017-03-09 RX ADMIN — MORPHINE SULFATE 3 MG: 10 INJECTION INTRAVENOUS at 08:19

## 2017-03-09 RX ADMIN — OXYCODONE HYDROCHLORIDE 20 MG: 10 TABLET ORAL at 18:04

## 2017-03-09 RX ADMIN — ALPRAZOLAM 1 MG: 1 TABLET ORAL at 18:59

## 2017-03-09 RX ADMIN — GABAPENTIN 400 MG: 400 CAPSULE ORAL at 22:20

## 2017-03-09 RX ADMIN — NICOTINE 1 PATCH: 21 PATCH TRANSDERMAL at 06:15

## 2017-03-09 RX ADMIN — MORPHINE SULFATE 60 MG: 60 TABLET, EXTENDED RELEASE ORAL at 22:20

## 2017-03-09 RX ADMIN — CITALOPRAM HYDROBROMIDE 40 MG: 20 TABLET ORAL at 08:12

## 2017-03-09 RX ADMIN — DOCUSATE SODIUM 100 MG: 100 CAPSULE ORAL at 16:53

## 2017-03-09 RX ADMIN — KETOROLAC TROMETHAMINE 30 MG: 30 INJECTION, SOLUTION INTRAMUSCULAR; INTRAVENOUS at 16:55

## 2017-03-09 RX ADMIN — SODIUM CHLORIDE 1000 ML: 9 INJECTION, SOLUTION INTRAVENOUS at 11:50

## 2017-03-09 RX ADMIN — DOCUSATE SODIUM 100 MG: 100 CAPSULE ORAL at 22:20

## 2017-03-09 RX ADMIN — GABAPENTIN 400 MG: 400 CAPSULE ORAL at 16:53

## 2017-03-09 RX ADMIN — GABAPENTIN 400 MG: 400 CAPSULE ORAL at 08:13

## 2017-03-09 RX ADMIN — TIZANIDINE 4 MG: 4 TABLET ORAL at 08:13

## 2017-03-09 RX ADMIN — DOCUSATE SODIUM 100 MG: 100 CAPSULE ORAL at 08:13

## 2017-03-09 RX ADMIN — OXYCODONE HYDROCHLORIDE 20 MG: 10 TABLET ORAL at 06:15

## 2017-03-09 RX ADMIN — KETOROLAC TROMETHAMINE 30 MG: 30 INJECTION, SOLUTION INTRAMUSCULAR; INTRAVENOUS at 08:13

## 2017-03-09 RX ADMIN — MORPHINE SULFATE 60 MG: 60 TABLET, EXTENDED RELEASE ORAL at 08:13

## 2017-03-09 ASSESSMENT — PAIN SCALES - GENERAL
PAINLEVEL_OUTOF10: ASSUMED PAIN PRESENT
PAINLEVEL_OUTOF10: 10
PAINLEVEL_OUTOF10: ASSUMED PAIN PRESENT
PAINLEVEL_OUTOF10: ASSUMED PAIN PRESENT

## 2017-03-09 ASSESSMENT — COPD QUESTIONNAIRES: DURING THE PAST 4 WEEKS HOW MUCH DID YOU FEEL SHORT OF BREATH: NONE/LITTLE OF THE TIME

## 2017-03-09 ASSESSMENT — GAIT ASSESSMENTS
DEVIATION: ANTALGIC;BRADYKINETIC;SHUFFLED GAIT
ASSISTIVE DEVICE: FRONT WHEEL WALKER
GAIT LEVEL OF ASSIST: CONTACT GUARD ASSIST
DISTANCE (FEET): 20

## 2017-03-09 NOTE — THERAPY
"Patient presents with decreased functional mobility status secondary to listed deficits and problems. Patient will benefit from continued acute and post acute PT servicesPhysical Therapy Treatment completed.   Bed Mobility:  Supine to Sit: Moderate Assist  Transfers: Sit to Stand: Minimal Assist  Gait: Level Of Assist: Contact Guard Assist with Front-Wheel Walker       Plan of Care: Will benefit from Physical Therapy 5 times per week  Discharge Recommendations: Equipment: Front-Wheel Walker. Post-acute therapy recommended before discharged home.     See \"Rehab Therapy-Acute\" Patient Summary Report for complete documentation.       "

## 2017-03-09 NOTE — DISCHARGE PLANNING
Medical Social Work    RO received order for Anti-Coagulation clinic.  RO contacted ER  x [phone] who states she will schedule pt for first available appointment.  Appt will be listed on AVS.

## 2017-03-09 NOTE — PROGRESS NOTES
Shadia Crespo case management [facility] needs to be notified when pt goes home [phone]. She will be here tomorrow around 1430 for housing interview.

## 2017-03-09 NOTE — PROGRESS NOTES
Inpatient Anticoagulation Service Note    Date: 3/9/2017  Reason for Anticoagulation: Orthopedic Prophylaxis        Hemoglobin Value: 9.3  Hematocrit Value: 27.5 (This result has been called to [id number] by Ramya Shook on 03 08 2017 at  0631, and has been read back.  )  Lab Platelet Value: 135  Target INR: 2.0 to 2.5    INR from last 7 days     Date/Time INR Value    03/09/17 0400 (!)2.72    03/08/17 0541 (!)1.31        Dose from last 7 days     Date/Time Dose (mg)    03/09/17 1400 0    03/08/17 1300 5    03/07/17 1500 5        Bridge Therapy: No     Comments: INR with significant jump from previous, now above 2.0-2.5 range. Patient noted to have significant post-op pain, which may increase INR. No new CBC, no reports of bleeding. Will hold dose tonight since INR trending up dramatically and re-assess in AM.    Plan:  HOLD warfarin tonight. INR in AM.     Pharmacist suggested discharge dosing: to be determined     Fer Riggs, PharmD

## 2017-03-09 NOTE — THERAPY
"Physical Therapy Evaluation completed.   Bed Mobility:  Supine to Sit: Moderate Assist  Transfers: Sit to Stand: Moderate Assist  Gait: Level Of Assist: Refused with Front-Wheel Walker       Plan of Care: Will benefit from Physical Therapy 5 times per week  Discharge Recommendations: Equipment: Front-Wheel Walker. Post-acute therapy recommended before discharged home.    See \"Rehab Therapy-Acute\" Patient Summary Report for complete documentation.     "

## 2017-03-09 NOTE — THERAPY
"Physical Therapy Treatment completed.   Bed Mobility:  Supine to Sit:  (up in chair)  Transfers: Sit to Stand: Minimal Assist  Gait: Level Of Assist: Contact Guard Assist with Front-Wheel Walker       Plan of Care: Will benefit from Physical Therapy 5 times per week  Discharge Recommendations: Equipment: Front-Wheel Walker. Post-acute therapy recommended before discharged home.  Patient is motivated to DC in next few days but has stairs into her home.       See \"Rehab Therapy-Acute\" Patient Summary Report for complete documentation.       "

## 2017-03-09 NOTE — CARE PLAN
Problem: Venous Thromboembolism (VTW)/Deep Vein Thrombosis (DVT) Prevention:  Goal: Patient will participate in Venous Thrombosis (VTE)/Deep Vein Thrombosis (DVT)Prevention Measures  Outcome: PROGRESSING AS EXPECTED  scds are in place and coumadin is ordered     Problem: Pain Management  Goal: Pain level will decrease to patient’s comfort goal  Outcome: PROGRESSING SLOWER THAN EXPECTED  Pt states her pain is 10/10 but will fall asleep mid conversation

## 2017-03-09 NOTE — FACE TO FACE
Face to Face Note  -  Durable Medical Equipment    Serafin Fitzgerald M.D. - NPI: [id number]  I certify that this patient is under my care and that they had a durable medical equipment(DME)face to face encounter by myself that meets the physician DME face-to-face encounter requirements with this patient on:    Date of encounter:   Patient:                    MRN:                       YOB: 2015  Manisha Rincon  [id number]  1950     The encounter with the patient was in whole, or in part, for the following medical condition, which is the primary reason for durable medical equipment:  Total Joint Replacement    I certify that, based on my findings, the following durable medical equipment is medically necessary:  Other DME Equipment - blood draws for coumadin.    HOME O2 Saturation Measurements:(Values must be present for Home Oxygen orders)         ,     ,         My Clinical findings support the need for the above equipment due to:  Other - anticoagulation    Supporting Symptoms: needs anticoagulation post total hip

## 2017-03-09 NOTE — PROGRESS NOTES
Progress Note               Author: Serafin Fitzgerald Date & Time created: 3/9/2017  12:36 PM     Interval History:  Pt much more comfortable but somnolent    Review of Systems:  ROS    Physical Exam:  Physical Exam   Neurological:   Sensation and motor intact b le       Labs:        Invalid input(s): [id number]      Recent Labs      17   0541   SODIUM  141   POTASSIUM  4.2   CHLORIDE  105   CO2  31   BUN  17   CREATININE  0.71   CALCIUM  8.4*     Recent Labs      17   0541   GLUCOSE  136*     Recent Labs      17   0541  17   0400   RBC  3.06*   --    HEMOGLOBIN  9.3*   --    HEMATOCRIT  27.5*   --    PLATELETCT  135*   --    PROTHROMBTM  16.7*  29.7*   INR  1.31*  2.72*     Recent Labs      17   0541   WBC  4.6*     Hemodynamics:  Temp (24hrs), Av.1 °C (98.8 °F), Min:36.4 °C (97.5 °F), Max:37.8 °C (100.1 °F)  Temperature: 37.2 °C (99 °F)  Pulse  Av.9  Min: 60  Max: 91   Blood Pressure: (!) 74/42 mmHg     Respiratory:    Respiration: 20, Pulse Oximetry: 95 %           Fluids:    Intake/Output Summary (Last 24 hours) at 17 1236  Last data filed at 17 0631   Gross per 24 hour   Intake      0 ml   Output   1450 ml   Net  -1450 ml        GI/Nutrition:  Orders Placed This Encounter   Procedures   • DIET ORDER     Standing Status: Standing      Number of Occurrences: 1      Standing Expiration Date:      Order Specific Question:  Diet:     Answer:  Full Liquid [11]     Medical Decision Making, by Problem:  Active Hospital Problems    Diagnosis   • Left hip pain [M25.552]       Plan:  Dc home tomorrow if passes pt and nursing criteria    Core Measures

## 2017-03-09 NOTE — PROGRESS NOTES
"Benton catheter removed per order. Pt tolerated well, requested a bedpan at bedside \"in case of potty emergency.\" Pt educated that getting up to the restroom would be best for mobility. Pt agreed to get up if staff is there \"in time.\"  "

## 2017-03-10 ENCOUNTER — PATIENT OUTREACH (OUTPATIENT)
Dept: HEALTH INFORMATION MANAGEMENT | Facility: OTHER | Age: 67
End: 2017-03-10

## 2017-03-10 VITALS
RESPIRATION RATE: 16 BRPM | HEART RATE: 93 BPM | WEIGHT: 133.38 LBS | TEMPERATURE: 98.1 F | SYSTOLIC BLOOD PRESSURE: 119 MMHG | HEIGHT: 65 IN | OXYGEN SATURATION: 95 % | DIASTOLIC BLOOD PRESSURE: 73 MMHG | BODY MASS INDEX: 22.22 KG/M2

## 2017-03-10 LAB
INR PPP: 3.14 (ref 0.87–1.13)
PROTHROMBIN TIME: 33.2 SEC (ref 12–14.6)

## 2017-03-10 PROCEDURE — A9270 NON-COVERED ITEM OR SERVICE: HCPCS | Performed by: ORTHOPAEDIC SURGERY

## 2017-03-10 PROCEDURE — 36415 COLL VENOUS BLD VENIPUNCTURE: CPT

## 2017-03-10 PROCEDURE — 97110 THERAPEUTIC EXERCISES: CPT

## 2017-03-10 PROCEDURE — 97535 SELF CARE MNGMENT TRAINING: CPT

## 2017-03-10 PROCEDURE — 97530 THERAPEUTIC ACTIVITIES: CPT

## 2017-03-10 PROCEDURE — 85610 PROTHROMBIN TIME: CPT

## 2017-03-10 PROCEDURE — 700112 HCHG RX REV CODE 229: Performed by: ORTHOPAEDIC SURGERY

## 2017-03-10 PROCEDURE — 97116 GAIT TRAINING THERAPY: CPT

## 2017-03-10 PROCEDURE — 700102 HCHG RX REV CODE 250 W/ 637 OVERRIDE(OP): Performed by: ORTHOPAEDIC SURGERY

## 2017-03-10 RX ADMIN — OXYCODONE HYDROCHLORIDE 20 MG: 10 TABLET ORAL at 03:02

## 2017-03-10 RX ADMIN — OXYCODONE HYDROCHLORIDE 20 MG: 10 TABLET ORAL at 11:17

## 2017-03-10 RX ADMIN — NICOTINE 1 PATCH: 21 PATCH TRANSDERMAL at 06:30

## 2017-03-10 RX ADMIN — DOCUSATE SODIUM 100 MG: 100 CAPSULE ORAL at 14:21

## 2017-03-10 RX ADMIN — GABAPENTIN 400 MG: 400 CAPSULE ORAL at 14:21

## 2017-03-10 RX ADMIN — MORPHINE SULFATE 60 MG: 60 TABLET, EXTENDED RELEASE ORAL at 08:04

## 2017-03-10 RX ADMIN — CITALOPRAM HYDROBROMIDE 40 MG: 20 TABLET ORAL at 08:04

## 2017-03-10 RX ADMIN — OXYCODONE HYDROCHLORIDE 20 MG: 10 TABLET ORAL at 06:30

## 2017-03-10 RX ADMIN — OXYCODONE HYDROCHLORIDE 20 MG: 10 TABLET ORAL at 18:05

## 2017-03-10 RX ADMIN — DOCUSATE SODIUM 100 MG: 100 CAPSULE ORAL at 08:04

## 2017-03-10 RX ADMIN — GABAPENTIN 400 MG: 400 CAPSULE ORAL at 08:04

## 2017-03-10 RX ADMIN — OXYCODONE HYDROCHLORIDE 20 MG: 10 TABLET ORAL at 14:20

## 2017-03-10 ASSESSMENT — PAIN SCALES - GENERAL
PAINLEVEL_OUTOF10: 9
PAINLEVEL_OUTOF10: 8
PAINLEVEL_OUTOF10: 10
PAINLEVEL_OUTOF10: 6

## 2017-03-10 ASSESSMENT — GAIT ASSESSMENTS
GAIT LEVEL OF ASSIST: STAND BY ASSIST
DISTANCE (FEET): 40
ASSISTIVE DEVICE: FRONT WHEEL WALKER

## 2017-03-10 NOTE — PROGRESS NOTES
Progress Note               Author: Serafin Fitzgerald Date & Time created: 3/10/2017  1:08 PM     Interval History:  Ready to go home  Review of Systems:  ROS    Physical Exam:  Physical Exam   Neurological:   Sensation and motor intact b le wound c/d/i       Labs:        Invalid input(s): [id number]      Recent Labs      17   0541   SODIUM  141   POTASSIUM  4.2   CHLORIDE  105   CO2  31   BUN  17   CREATININE  0.71   CALCIUM  8.4*     Recent Labs      17   0541   GLUCOSE  136*     Recent Labs      17   0541  17   0400  03/10/17   0449   RBC  3.06*   --    --    HEMOGLOBIN  9.3*   --    --    HEMATOCRIT  27.5*   --    --    PLATELETCT  135*   --    --    PROTHROMBTM  16.7*  29.7*  33.2*   INR  1.31*  2.72*  3.14*     Recent Labs      17   0541   WBC  4.6*     Hemodynamics:  Temp (24hrs), Av.7 °C (98.1 °F), Min:36.5 °C (97.7 °F), Max:37.1 °C (98.7 °F)  Temperature: 37.1 °C (98.7 °F)  Pulse  Av.6  Min: 60  Max: 91   Blood Pressure: 126/63 mmHg     Respiratory:    Respiration: 16, Pulse Oximetry: 97 %           Fluids:  No intake or output data in the 24 hours ending 03/10/17 1308     GI/Nutrition:  Orders Placed This Encounter   Procedures   • DIET ORDER     Standing Status: Standing      Number of Occurrences: 1      Standing Expiration Date:      Order Specific Question:  Diet:     Answer:  Full Liquid [11]     Medical Decision Making, by Problem:  Active Hospital Problems    Diagnosis   • Left hip pain [M25.552]       Plan:  Dc home    Core Measures

## 2017-03-10 NOTE — PROGRESS NOTES
Inpatient Anticoagulation Service Note    Date: 3/10/2017  Reason for Anticoagulation: Orthopedic Prophylaxis        Hemoglobin Value: 9.3  Hematocrit Value: 27.5 (This result has been called to [id number] by Ramya Shook on 03 08 2017 at  0631, and has been read back.  )  Lab Platelet Value: 135  Target INR: 2.0 to 2.5    INR from last 7 days     Date/Time INR Value    03/10/17 0449 (!)3.14    03/09/17 0400 (!)2.72    03/08/17 0541 (!)1.31        Dose from last 7 days     Date/Time Dose (mg)    03/10/17 1300 0    03/09/17 1400 0    03/08/17 1300 5    03/07/17 1500 5        Bridge Therapy: No     Comments: INR continues to increase, no warfarin given last PM. LE wound CDI per MD note, no bleeding reported, no new CBC. Pt likely to discharge soon, cannot recommend any further doses of warfarin until INR <2.5.    Plan:  HOLD warfarin. INR in AM if patient remains.     Pharmacist suggested discharge dosing: recommend no further warfarin unless patient will have INR checked outpatient     Fer Riggs, PharmD

## 2017-03-10 NOTE — CARE PLAN
Problem: Discharge Barriers/Planning  Goal: Patient’s continuum of care needs will be met  Outcome: PROGRESSING SLOWER THAN EXPECTED  DC ordered in for tomorrow per Dr. Fitzgerald. Once pt pain is controled and does not sedate pt.     Problem: Pain Management  Goal: Pain level will decrease to patient’s comfort goal  Outcome: NOT MET    Problem: Mobility  Goal: Risk for activity intolerance will decrease  Outcome: PROGRESSING AS EXPECTED  Pt was able to get to the restroom but moved very slow and was very tired.

## 2017-03-10 NOTE — PROGRESS NOTES
Attempted to medicate pt per MAR but pt only wakes for very brief periods, will not keep eyes open for more than a second at a time. Will medicate when pt is more rousable.

## 2017-03-10 NOTE — THERAPY
" Patient presents with decreased functional mobility status secondary to listed deficits and problems.  Pts mentation was much clearer today and Pts functional mobility skills much improved, Pts sly( Tyler) and Pts friend were all trained on safety with stair training, gait and transfers using FWW, Pts SO stated they will  set up bed and 3 in one commode( please check OT recs)  in downstairs level of [facility] Pts strength returs. Pts SO  appears safe with providing 24/7 care for his wife at home. Pt would benifit from  when MD clears Physical Therapy Treatment completed.   Bed Mobility:  Supine to Sit: Supervised  Transfers: Sit to Stand: Contact Guard Assist  Gait: Level Of Assist: Stand by Assist with Front-Wheel Walker       Plan of Care: Will benefit from Physical Therapy 5 times per week  Discharge Recommendations: Equipment: Bedside Commode. Post-acute therapy recommended after discharged home.     See \"Rehab Therapy-Acute\" Patient Summary Report for complete documentation.       "

## 2017-03-10 NOTE — DISCHARGE INSTRUCTIONS
Discharge Instructions  *Follow up with Dr. Fitzgerald in 2 weeks  *Weight bearing as tolerated                 *Activity as tolerated  *Use assistive device for all activity  *Continue exercises provided by physical therapy  *Elevate leg as needed  *Ice as needed (20 minutes every 1-2 hours)  *Change dressing as needed  *Ok to shower once home.  *No soaking of the incision; no baths, hot tubs, or swimming until cleared by doctor         *No driving until cleared by doctor  *Take medications as prescribed by doctor  *Call doctor’s office with any questions or concerns     Discharged to home by car with relative. Discharged via wheelchair, hospital escort: Yes.  Special equipment needed: Walker    Be sure to schedule a follow-up appointment with your primary care doctor or any specialists as instructed.     Discharge Plan:   Diet Plan: Discussed  Activity Level: Discussed  Smoking Cessation Offered: Patient Refused  Confirmed Follow up Appointment: Patient to Call and Schedule Appointment  Confirmed Symptoms Management: Discussed  Medication Reconciliation Updated: Yes  Influenza Vaccine Indication: Indicated: 9 to 64 years of age  Influenza Vaccine Given - only chart on this line when given: Influenza Vaccine Given (See MAR)    I understand that a diet low in cholesterol, fat, and sodium is recommended for good health. Unless I have been given specific instructions below for another diet, I accept this instruction as my diet prescription.   Other diet: Diet as tolerated    Special Instructions:     Discharge instructions for the Orthopedic Patient    Follow up with Primary Care Physician within 2 weeks of discharge to home, regarding:  Review of medications and diagnostic testing.  Surveillance for medical complications.  Workup and treatment of osteoporosis, if appropriate.     -Is this a Joint Replacement patient? Yes   Total Joint Hip Replacement Discharge Instructions    Pain  - The goal is to slowly wean off the  prescription pain medicine.  - Ice can be used for pain control.  20 minutes at a time is recommended, and never directly against your skin or incision.  - Most patients are off the pain pills by 3 weeks; others may require a low level of pain medications for many months. If your pain continues to be severe, follow up with your physician.  Infection  Deep hip joint infections that require removal of the prostheses occur in less than 0.1% of patients. Lesser infections in the skin (cellulites) are more common and much more easily treated.  - Keep the incision as clean and dry as possible.  - Always wash your hands before touching your incision.  - Skin infections tend to develop around 7-10 days after surgery, most can be treated with oral antibiotics.  - Dental Care should be delayed for 3 months after surgery, your surgeon recommends taking a dose of antibiotics 1 hour prior to any dental procedure.  After 2 years, most surgeons recommend antibiotics only before an extensive procedure.  Ask your surgeon what he recommends.  - Signs and symptoms of infection can include:  low grade fever, redness, pain, swelling and drainage from your incision.  Notify your surgeon immediately if you develop any of these symptoms.  Post op Disturbances  - Bowel habits - constipation is extremely common and is caused by a combination of anesthesia, lack of mobility and pain medicine.  Use stool softeners or laxatives if necessary. It is important not to ignore this problem, as bowel obstructions can be a serious complication after joint replacement surgery.  - Mood/Energy Level - Many patients experience a lack of energy and endurance for up to 2-3 months after surgery.  Some may also feel down and can even become depressed.  This is likely due to the postoperative anemia, change in activity level, lack of sleep, pain medicine and just the emotional reaction to the surgery itself that is a big disruption in a person’s life.  This  usually passes.  If symptoms persist, follow up with your primary physician.  - Returning to work - Your surgeon will give you more specific instructions.  Generally, if you work a sedentary job requiring little standing or walking, most patients may return within 2-6 weeks.  Manual labor jobs involving walking, lifting and standing may take 3-4 months.  Your surgeon’s office can provide a release to part-time or light duty work early on in your recovery and progress you to full duty as able.  - Driving - You can begin driving an automatic shift car in 4 to 8 weeks, provided you are no longer taking narcotic pain medication. If you have a stick-shift car and your right hip was replaced, do not begin driving until your doctor says you can.   - Avoiding falls -  throw rugs and tack down loose carpeting.  Be aware of floor hazards such as pets, small objects or uneven surfaces.   -  Airport Metal Detectors - The sensitivity of metal detectors varies and it is likely that your prosthesis will cause an alarm. Inform the  that you have an artificial joint.  Diet  - Resume your normal diet as tolerated.  - It is important to achieve a healthy nutritional status by eating a well balanced diet on a regular basis.  - Your physician may recommend that you take iron and vitamin supplements.   - Continue to drink plenty of fluids.  Shower/Bathing  - You may shower as soon as you get home from the hospital unless otherwise instructed.  - Keep your incision out of water.  To keep the incision dry when showering, cover it with a plastic bag or plastic wrap.  - Pat incision dry if it gets wet.  Don’t rub.  - Do not submerge in a bath until staples are out and the incision is completely healed. (Approximately 6-8 weeks after surgery).  Dressing Change:  Procedure (if recommended by your physician)  - Wash hands.  - Open all dressing change materials.  - Remove old dressing and discard.  - Inspect incision for  redness, increase in clear drainage, yellow/green drainage, odor and surrounding skin hot to touch.  -  ABD (large gauze) pad by one corner and lay over the incision.  Be careful not to touch the inside of the dressing that will lay over the incision.  - Secure in place as instructed (Ace wrap or tape).    Swelling/Bruising  - Swelling is normal after hip replacement and can involve the thigh, knee, calf and foot.  - Swelling can last from 3-6 months.  - Elevate your leg higher than your heart while reclining.  The first week you are home you should elevate your leg an equal amount of time, as you are active.    - Anti-inflammatory pills can be taken once you have stopped the blood thinners.  - The swelling is usually worse after you go home since you are upright for longer periods of time.  - Bruising is common and can involve the entire leg including the thigh, calf and even foot.  Bruising often does not appear until after you arrive home and it can be quite dramatic- purple, black, green.  The bruising you can see is not usually concerning and will subside without any treatment.      Blood Clot Prevention  Blood clots in the legs and the less common, but frightening, clots that travel to the lungs are a real focus of our preventative. Most patients are at standard risk for them, but those patients who are at higher risk include people who have had previous clots, a family history of clotting, smoking, diabetes, obesity, advanced age, use of estrogen and a sedentary lifestyle.    - Signs of blood clots in legs - Swelling in thigh, calf or ankle that does not go down with elevation.  Pain, heat and tenderness in calf, back of calf or groin area.  NOTE: blood clots can occur in either leg.  - You have been receiving anticoagulant therapy (blood thinners) in the hospital and you may be instructed to continue at home depending on your risk factors.  - Your risk for developing a clot continues for up to 2-3  months after surgery.  You should avoid prolonged sitting and dehydration during that time (long air trips and car trips).  If you do take a trip during this time, please get up and move around every 1- 1.5 hours.  - If you are prescribed blood thinning medication for home, follow instructions as directed. (Handouts provided if applicable).      Activity    Once you get home, you should stay active. The key is not to overdo it! While you can expect some good days and some bad days, you should notice a gradual improvement over time you should notice a gradual improvement and a gradual increase in your endurance over the next 6 to 12 months.    - Weight Bearing - If you have undergone cemented or hybrid hip replacement, you can put some weight on the leg immediately using a cane or walker, and you should continue to use some support for 4 to 6 weeks to help the muscles recover.   - Sleeping Positions - Sleep on your back with your legs slightly apart or on your side with a regular pillow between your knees. Be sure to use the pillow for at least 6 weeks, or until your doctor says you can do without it. Sleeping on your stomach should be all right  - Sitting - For at least the first 3 months, sit only in chairs that have arms. Do not sit on low chairs, low stools, or reclining chairs. Do not cross your legs at the knees. The physical therapist will show you how to sit and stand from a chair, keeping your affected leg out in front of you. Get up and move around on a regular basis--at least once every hour.  - Walking - Walk as much as you like once your doctor gives you the go-ahead, but remember that walking is no substitute for your prescribed exercises. Walking with a pair of trekking poles is helpful and adds as much as 40% to the exercise you get when you walk  - Therapy may be needed in some cases, to strengthen your muscles and improve your gait (walking pattern).  This decision will be made at your  post-operative appointment.  Follow your therapist recommended post-operative exercises (handout provided by Therapist).  - Swimming is also recommended; you can begin as soon as the sutures have been removed and the wound is healed, approximately 6 to 8 weeks after surgery. Using a pair of training fins may make swimming a more enjoyable and effective exercise.  - Other activities - Lower impact activities are preferred.  If you have specific questions, consult your Surgeon.    - Sexual activity - Your surgeon can tell you when it should be safe to resume sexual activity.      When to Call the Doctor   Call the physician if:   - Fever over 100.5? F  - Increased pain, drainage, redness, odor or heat around the incision area  - Shaking chills  - Increased knee pain with activity and rest  - Increased pain in calf, tenderness or redness above or below the knee  - Increased swelling of calf, ankle, foot  - Sudden increased shortness of breath, sudden onset of chest pain, localized chest pain with coughing  - Incision opening  Or, if there are any questions or concerns about medications or care.       -Is this patient being discharged with medication to prevent blood clots?  No    · Is patient discharged on Warfarin / Coumadin?   Yes    You are receiving the drug warfarin. Please understand the importance of monitoring warfarin with scheduled PT/INR blood draws.  Follow-up with the [facility] in one week for INR lab..    IMPORTANT: HOW TO USE THIS INFORMATION:  This is a summary and does NOT have all possible information about this product. This information does not assure that this product is safe, effective, or appropriate for you. This information is not individual medical advice and does not substitute for the advice of your health care professional. Always ask your health care professional for complete information about this product and your specific health needs.      WARFARIN - ORAL (WARF-uh-rin)      COMMON  "BRAND NAME(S): Coumadin      WARNING:  Warfarin can cause very serious (possibly fatal) bleeding. This is more likely to occur when you first start taking this medication or if you take too much warfarin. To decrease your risk for bleeding, your doctor or other health care provider will monitor you closely and check your lab results (INR test) to make sure you are not taking too much warfarin. Keep all medical and laboratory appointments. Tell your doctor right away if you notice any signs of serious bleeding. See also Side Effects section.      USES:  This medication is used to treat blood clots (such as in deep vein thrombosis-DVT or pulmonary embolus-PE) and/or to prevent new clots from forming in your body. Preventing harmful blood clots helps to reduce the risk of a stroke or heart attack. Conditions that increase your risk of developing blood clots include a certain type of irregular heart rhythm (atrial fibrillation), heart valve replacement, recent heart attack, and certain surgeries (such as hip/knee replacement). Warfarin is commonly called a \"blood thinner,\" but the more correct term is \"anticoagulant.\" It helps to keep blood flowing smoothly in your body by decreasing the amount of certain substances (clotting proteins) in your blood.      HOW TO USE:  Read the Medication Guide provided by your pharmacist before you start taking warfarin and each time you get a refill. If you have any questions, ask your doctor or pharmacist. Take this medication by mouth with or without food as directed by your doctor or other health care professional, usually once a day. It is very important to take it exactly as directed. Do not increase the dose, take it more frequently, or stop using it unless directed by your doctor. Dosage is based on your medical condition, laboratory tests (such as INR), and response to treatment. Your doctor or other health care provider will monitor you closely while you are taking this " medication to determine the right dose for you. Use this medication regularly to get the most benefit from it. To help you remember, take it at the same time each day. It is important to eat a balanced, consistent diet while taking warfarin. Some foods can affect how warfarin works in your body and may affect your treatment and dose. Avoid sudden large increases or decreases in your intake of foods high in vitamin K (such as broccoli, cauliflower, cabbage, brussels sprouts, kale, spinach, and other green leafy vegetables, liver, green tea, certain vitamin supplements). If you are trying to lose weight, check with your doctor before you try to go on a diet. Cranberry products may also affect how your warfarin works. Limit the amount of cranberry juice (16 ounces/480 milliliters a day) or other cranberry products you may drink or eat.      SIDE EFFECTS:  Nausea, loss of appetite, or stomach/abdominal pain may occur. If any of these effects persist or worsen, tell your doctor or pharmacist promptly. Remember that your doctor has prescribed this medication because he or she has judged that the benefit to you is greater than the risk of side effects. Many people using this medication do not have serious side effects. This medication can cause serious bleeding if it affects your blood clotting proteins too much (shown by unusually high INR lab results). Even if your doctor stops your medication, this risk of bleeding can continue for up to a week. Tell your doctor right away if you have any signs of serious bleeding, including: unusual pain/swelling/discomfort, unusual/easy bruising, prolonged bleeding from cuts or gums, persistent/frequent nosebleeds, unusually heavy/prolonged menstrual flow, pink/dark urine, coughing up blood, vomit that is bloody or looks like coffee grounds, severe headache, dizziness/fainting, unusual or persistent tiredness/weakness, bloody/black/tarry stools, chest pain, shortness of breath,  difficulty swallowing. Tell your doctor right away if any of these unlikely but serious side effects occur: persistent nausea/vomiting, severe stomach/abdominal pain, yellowing eyes/skin. This drug rarely has caused very serious (possibly fatal) problems if its effects lead to small blood clots (usually at the beginning of treatment). This can lead to severe skin/tissue damage that may require surgery or amputation if left untreated. Patients with certain blood conditions (protein C or S deficiency) may be at greater risk. Get medical help right away if any of these rare but serious side effects occur: painful/red/purplish patches on the skin (such as on the toe, breast, abdomen), change in the amount of urine, vision changes, confusion, slurred speech, weakness on one side of the body. A very serious allergic reaction to this drug is rare. However, get medical help right away if you notice any symptoms of a serious allergic reaction, including: rash, itching/swelling (especially of the face/tongue/throat), severe dizziness, trouble breathing. This is not a complete list of possible side effects. If you notice other effects not listed above, contact your doctor or pharmacist. In the [place] - Call your doctor for medical advice about side effects. You may report side effects to FDA at 3-031-VZI-4346. In [place] - Call your doctor for medical advice about side effects. You may report side effects to [facility] [place] at 1-304.292.2214.      PRECAUTIONS:  Before taking warfarin, tell your doctor or pharmacist if you are allergic to it; or if you have any other allergies. This product may contain inactive ingredients, which can cause allergic reactions or other problems. Talk to your pharmacist for more details. Before using this medication, tell your doctor or pharmacist your medical history, especially of: blood disorders (such as anemia, hemophilia), bleeding problems (such as bleeding of the stomach/intestines, bleeding in  the brain), blood vessel disorders (such as aneurysms), recent major injury/surgery, liver disease, alcohol use, mental/mood disorders (including memory problems), frequent falls/injuries. It is important that all your doctors and dentists know that you take warfarin. Before having surgery or any medical/dental procedures, tell your doctor or dentist that you are taking this medication and about all the products you use (including prescription drugs, nonprescription drugs, and herbal products). Avoid getting injections into the muscles. If you must have an injection into a muscle (for example, a flu shot), it should be given in the arm. This way, it will be easier to check for bleeding and/or apply pressure bandages. This medication may cause stomach bleeding. Daily use of alcohol while using this medicine will increase your risk for stomach bleeding and may also affect how this medication works. Limit or avoid alcoholic beverages. If you have not been eating well, if you have an illness or infection that causes fever, vomiting, or diarrhea for more than 2 days, or if you start using any antibiotic medications, contact your doctor or pharmacist immediately because these conditions can affect how warfarin works. This medication can cause heavy bleeding. To lower the chance of getting cut, bruised, or injured, use great caution with sharp objects like safety razors and nail cutters. Use an electric razor when shaving and a soft toothbrush when brushing your teeth. Avoid activities such as contact sports. If you fall or injure yourself, especially if you hit your head, call your doctor immediately. Your doctor may need to check you. The Food & Drug Administration has stated that generic warfarin products are interchangeable. However, consult your doctor or pharmacist before switching warfarin products. Be careful not to take more than one medication that contains warfarin unless specifically directed by the doctor or  "health care provider who is monitoring your warfarin treatment. Older adults may be at greater risk for bleeding while using this drug. This medication is not recommended for use during pregnancy because of serious (possibly fatal) harm to an unborn baby. Discuss the use of reliable forms of birth control with your doctor. If you become pregnant or think you may be pregnant, tell your doctor immediately. If you are planning pregnancy, discuss a plan for managing your condition with your doctor before you become pregnant. Your doctor may switch the type of medication you use during pregnancy. Very small amounts of this medication may pass into breast milk but is unlikely to harm a nursing infant. Consult your doctor before breast-feeding.      DRUG INTERACTIONS:  Drug interactions may change how your medications work or increase your risk for serious side effects. This document does not contain all possible drug interactions. Keep a list of all the products you use (including prescription/nonprescription drugs and herbal products) and share it with your doctor and pharmacist. Do not start, stop, or change the dosage of any medicines without your doctor's approval. Warfarin interacts with many prescription, nonprescription, vitamin, and herbal products. This includes medications that are applied to the skin or inside the vagina or rectum. The interactions with warfarin usually result in an increase or decrease in the \"blood-thinning\" (anticoagulant) effect. Your doctor or other health care professional should closely monitor you to prevent serious bleeding or clotting problems. While taking warfarin, it is very important to tell your doctor or pharmacist of any changes in medications, vitamins, or herbal products that you are taking. Some products that may interact with this drug include: capecitabine, imatinib, mifepristone. Aspirin, aspirin-like drugs (salicylates), and nonsteroidal anti-inflammatory drugs (NSAIDs " such as ibuprofen, naproxen, celecoxib) may have effects similar to warfarin. These drugs may increase the risk of bleeding problems if taken during treatment with warfarin. Carefully check all prescription/nonprescription product labels (including drugs applied to the skin such as pain-relieving creams) since the products may contain NSAIDs or salicylates. Talk to your doctor about using a different medication (such as acetaminophen) to treat pain/fever. Low-dose aspirin and related drugs (such as clopidogrel, ticlopidine) should be continued if prescribed by your doctor for specific medical reasons such as heart attack or stroke prevention. Consult your doctor or pharmacist for more details. Many herbal products interact with warfarin. Tell your doctor before taking any herbal products, especially bromelains, coenzyme Q10, cranberry, danshen, dong quai, fenugreek, garlic, ginkgo biloba, ginseng, and Nataly's wort, among others. This medication may interfere with a certain laboratory test to measure theophylline levels, possibly causing false test results. Make sure laboratory personnel and all your doctors know you use this drug.      OVERDOSE:  If overdose is suspected, contact a poison control center or emergency room immediately. [place] residents can call the [place] National Poison Hotline at 1-681.118.1617. [place] residents can call a provincial poison control center. Symptoms of overdose may include: bloody/black/tarry stools, pink/dark urine, unusual/prolonged bleeding.      NOTES:  Do not share this medication with others. Laboratory and/or medical tests (such as INR, complete blood count) must be performed periodically to monitor your progress or check for side effects. Consult your doctor for more details.      MISSED DOSE:  For the best possible benefit, do not miss any doses. If you do miss a dose and remember on the same day, take it as soon as you remember. If you remember on the next day, skip the missed  dose and resume your usual dosing schedule. Do not double the dose to catch up because this could increase your risk for bleeding. Keep a record of missed doses to give to your doctor or pharmacist. Contact your doctor or pharmacist if you miss 2 or more doses in a row.      STORAGE:  Store at room temperature away from light and moisture. Do not store in the bathroom. Keep all medications away from children and pets. Do not flush medications down the toilet or pour them into a drain unless instructed to do so. Properly discard this product when it is  or no longer needed. Consult your pharmacist or local waste disposal company for more details about how to safely discard your product.      MEDICAL ALERT:  Your condition and medication can cause complications in a medical emergency. For information about enrolling in MedicAlert, call [phone] ([place]) or [phone] ([place]).      Information last revised 2010 Copyright(c) 2010 [employer]             · Is patient Post Blood Transfusion?  No    Depression / Suicide Risk    As you are discharged from this [facility] facility, it is important to learn how to keep safe from harming yourself.    Recognize the warning signs:  · Abrupt changes in personality, positive or negative- including increase in energy   · Giving away possessions  · Change in eating patterns- significant weight changes-  positive or negative  · Change in sleeping patterns- unable to sleep or sleeping all the time   · Unwillingness or inability to communicate  · Depression  · Unusual sadness, discouragement and loneliness  · Talk of wanting to die  · Neglect of personal appearance   · Rebelliousness- reckless behavior  · Withdrawal from people/activities they love  · Confusion- inability to concentrate     If you or a loved one observes any of these behaviors or has concerns about self-harm, here's what you can do:  · Talk about it- your feelings and reasons for  harming yourself  · Remove any means that you might use to hurt yourself (examples: pills, rope, extension cords, firearm)  · Get professional help from the community (Mental Health, Substance Abuse, psychological counseling)  · Do not be alone:Call your Safe Contact- someone whom you trust who will be there for you.  · Call your local CRISIS HOTLINE 233-8434 or 607-742-2465  · Call your local Children's Mobile Crisis Response Team [place] (833) 674-2259 or [employer]  · Call the toll free National Suicide Prevention Hotlines   · National Suicide Prevention Lifeline 180-214-PGTY (9982)  · National Hope Line Network 800-SUICIDE (646-0802)    Oxycodone tablets or capsules  What is this medicine?  OXYCODONE (ox i KOE done) is a pain reliever. It is used to treat moderate to severe pain.  This medicine may be used for other purposes; ask your health care provider or pharmacist if you have questions.  COMMON BRAND NAME(S): Dazidox , Endocodone , OXECTA, OxyIR, Percolone, Roxicodone  What should I tell my health care provider before I take this medicine?  They need to know if you have any of these conditions:  -[place]'s disease  -brain tumor  -drug abuse or addiction  -head injury  -heart disease  -if you frequently drink alcohol containing drinks  -kidney disease or problems going to the bathroom  -liver disease  -lung disease, asthma, or breathing problems  -mental problems  -an unusual or allergic reaction to oxycodone, codeine, hydrocodone, morphine, other medicines, foods, dyes, or preservatives  -pregnant or trying to get pregnant  -breast-feeding  How should I use this medicine?  Take this medicine by mouth with a glass of water. Follow the directions on the prescription label. You can take it with or without food. If it upsets your stomach, take it with food. Take your medicine at regular intervals. Do not take it more often than directed. Do not stop taking except on your doctor's advice.  Some  brands of this medicine, like Oxecta, have special instructions. Ask your doctor or pharmacist if these directions are for you: Do not cut, crush or chew this medicine. Swallow only one tablet at a time. Do not wet, soak, or lick the tablet before you take it.  Talk to your pediatrician regarding the use of this medicine in children. Special care may be needed.  Overdosage: If you think you have taken too much of this medicine contact a poison control center or emergency room at once.  NOTE: This medicine is only for you. Do not share this medicine with others.  What if I miss a dose?  If you miss a dose, take it as soon as you can. If it is almost time for your next dose, take only that dose. Do not take double or extra doses.  What may interact with this medicine?  -alcohol  -antihistamines  -certain medicines used for nausea like chlorpromazine, droperidol  -erythromycin  -ketoconazole  -medicines for depression, anxiety, or psychotic disturbances  -medicines for sleep  -muscle relaxants  -naloxone  -naltrexone  -narcotic medicines (opiates) for pain  -nilotinib  -phenobarbital  -phenytoin  -rifampin  -ritonavir  -voriconazole  This list may not describe all possible interactions. Give your health care provider a list of all the medicines, herbs, non-prescription drugs, or dietary supplements you use. Also tell them if you smoke, drink alcohol, or use illegal drugs. Some items may interact with your medicine.  What should I watch for while using this medicine?  Tell your doctor or health care professional if your pain does not go away, if it gets worse, or if you have new or a different type of pain. You may develop tolerance to the medicine. Tolerance means that you will need a higher dose of the medicine for pain relief. Tolerance is normal and is expected if you take this medicine for a long time.  Do not suddenly stop taking your medicine because you may develop a severe reaction. Your body becomes used to  the medicine. This does NOT mean you are addicted. Addiction is a behavior related to getting and using a drug for a non-medical reason. If you have pain, you have a medical reason to take pain medicine. Your doctor will tell you how much medicine to take. If your doctor wants you to stop the medicine, the dose will be slowly lowered over time to avoid any side effects.  You may get drowsy or dizzy when you first start taking this medicine or change doses. Do not drive, use machinery, or do anything that may be dangerous until you know how the medicine affects you. Stand or sit up slowly.  There are different types of narcotic medicines (opiates) for pain. If you take more than one type at the same time, you may have more side effects. Give your health care provider a list of all medicines you use. Your doctor will tell you how much medicine to take. Do not take more medicine than directed. Call emergency for help if you have problems breathing.  This medicine will cause constipation. Try to have a bowel movement at least every 2 to 3 days. If you do not have a bowel movement for 3 days, call your doctor or health care professional.  Your mouth may get dry. Drinking water, chewing sugarless gum, or sucking on hard candy may help. See your dentist every 6 months.  What side effects may I notice from receiving this medicine?  Side effects that you should report to your doctor or health care professional as soon as possible:  -allergic reactions like skin rash, itching or hives, swelling of the face, lips, or tongue  -breathing problems  -confusion  -feeling faint or lightheaded, falls  -trouble passing urine or change in the amount of urine  -unusually weak or tired  Side effects that usually do not require medical attention (report to your doctor or health care professional if they continue or are bothersome):  -constipation  -dry mouth  -itching  -nausea, vomiting  -upset stomach  This list may not describe all  possible side effects. Call your doctor for medical advice about side effects. You may report side effects to FDA at 6-746-FDA-0344.  Where should I keep my medicine?  Keep out of the reach of children. This medicine can be abused. Keep your medicine in a safe place to protect it from theft. Do not share this medicine with anyone. Selling or giving away this medicine is dangerous and against the law.  Store at room temperature between 15 and 30 degrees C (59 and 86 degrees F). Protect from light. Keep container tightly closed.   Discard unused medicine and used packaging carefully. Pets and children can be harmed if they find used or lost packages. Flush any unused medicines down the toilet. Do not use the medicine after the expiration date.  NOTE: This sheet is a summary. It may not cover all possible information. If you have questions about this medicine, talk to your doctor, pharmacist, or health care provider.  © 2014, [employer]. (11/15/2012 8:33:37 AM)

## 2017-03-10 NOTE — PROGRESS NOTES
Received bedside shift report from night RN. Pt AOx4.  Pt reports pain is 10/10. Pt refused to get out of bed to work with PT or for breakfast. Does call appropriately. Bed alarm is on. Pt is laying in bed, she is demanding morphine. PIV assessed and is patent. Pt is on 3 L NC. POC discussed as well as unit routine, comfort, and safety. Dicussed DC planning to home. Discussed the goal for today pain control, safety and mobility. Reviewed orders, notes, labs, and test results. Hourly rounding in place with RN rounding on odd hours and CNA on even hours.

## 2017-03-10 NOTE — CARE PLAN
Problem: Pain Management  Goal: Pain level will decrease to patient’s comfort goal  Outcome: PROGRESSING AS EXPECTED  Pain well controlled with oral medications.    Problem: Skin Integrity  Goal: Risk for impaired skin integrity will decrease  Outcome: PROGRESSING AS EXPECTED  Out of bed as tolerated, repositioning frequently in bed.

## 2017-03-10 NOTE — DISCHARGE SUMMARY
DATE OF ADMISSION:  03/07/2017    DATE OF DISCHARGE:  03/10/2017    DISCHARGE DIAGNOSES:  Status post left total hip arthroplasty for degenerative   joint disease, osteoarthritis.    DISCHARGE MEDICATIONS:  Include Coumadin and oxycodone 20 one p.o. every 3   hours p.r.n. severe pain.    Patient is already on multiple pain medications that her pain physician   prescribes Dr. Lev Beard.    FOLLOWUP:  She has to follow up with me, Dr. Serafin Fitzgerald, [phone], in   approximately 2 weeks.    HISTORY OF PRESENT ILLNESS AND HOSPITAL COURSE:  The patient is a 66-year-old   lady with a chief complaint of left hip pain.  She has a tremendous history of   narcotic use and painful conditions and because of this she is on extremely   heavy regimen of narcotic pain medications.  Her surgery went without   complication.  Postoperatively, she has had some difficulty and that her   symptoms are hard to control secondary to narcotic tolerance.  However, we   have been able to arrive at a good pain regimen.  She has participated in   physical therapy and passed all the nursing criteria.  We will see her back in   our clinic in 2 weeks for staple removal.  She is weightbearing as tolerated   with posterior total hip precautions.       ____________________________________     MD LILIANA RUELAS / MASON    DD:  03/09/2017 12:49:21  DT:  03/09/2017 20:44:48    D#:  [id number]  Job#:  [id number]

## 2017-03-10 NOTE — FACE TO FACE
Face to Face Note  -  Durable Medical Equipment    Serafin Fitzgerald M.D. - NPI: [id number]  I certify that this patient is under my care and that they had a durable medical equipment(DME)face to face encounter by myself that meets the physician DME face-to-face encounter requirements with this patient on:    Date of encounter:   Patient:                    MRN:                       YOB: 2015  Manisha Rincon  [id number]  1950     The encounter with the patient was in whole, or in part, for the following medical condition, which is the primary reason for durable medical equipment:  Total Joint Replacement    I certify that, based on my findings, the following durable medical equipment is medically necessary:  Walkers and 3 in 1 Bedside [place].    HOME O2 Saturation Measurements:(Values must be present for Home Oxygen orders)         ,     ,         My Clinical findings support the need for the above equipment due to:  Abnormal Gait    Supporting Symptoms: pain after total hip replacement

## 2017-03-10 NOTE — PROGRESS NOTES
"MD aware of pt state today. Pt was very lethargic and confused today. She was drifting off to sleep easily durning conversation. Pt was demanding \"narcortics\" around 1715 once she was able to stay awake for a while. Oxycodone 20 mg was given by this RN. Pt was upset she could not get morphine IV and MD was notified. He said to just give the oxydocone for now.   "

## 2017-03-11 NOTE — PROGRESS NOTES
Patient discharged home all belongings with patient including red walker and patient wheelchair and went home via cab,  called to update him on discharge time at [phone]

## 2017-03-13 ENCOUNTER — ANTICOAGULATION VISIT (OUTPATIENT)
Dept: MEDICAL GROUP | Facility: MEDICAL CENTER | Age: 67
End: 2017-03-13
Payer: MEDICAID

## 2017-03-13 DIAGNOSIS — D68.9 OTHER AND UNSPECIFIED COAGULATION DEFECTS: ICD-10-CM

## 2017-03-14 ENCOUNTER — TELEPHONE (OUTPATIENT)
Dept: VASCULAR LAB | Facility: MEDICAL CENTER | Age: 67
End: 2017-03-14

## 2017-03-14 ENCOUNTER — ANTICOAGULATION VISIT (OUTPATIENT)
Dept: MEDICAL GROUP | Facility: MEDICAL CENTER | Age: 67
End: 2017-03-14
Payer: MEDICAID

## 2017-03-14 DIAGNOSIS — D68.9 OTHER AND UNSPECIFIED COAGULATION DEFECTS: ICD-10-CM

## 2017-03-14 LAB — INR PPP: 2.5 (ref 2–3.5)

## 2017-03-14 PROCEDURE — 85610 PROTHROMBIN TIME: CPT | Performed by: PHYSICIAN ASSISTANT

## 2017-03-14 NOTE — PROGRESS NOTES
Anticoagulation Summary as of 3/14/2017     INR goal 2.0-3.0   Selected INR 2.5 (3/14/2017)   Maintenance plan 5 mg (5 mg x 1) every day   Weekly total 35 mg   Plan last modified Robinson Edwards, PHARMD (3/13/2017)   Next INR check 3/20/2017   Target end date 4/6/2017    Indications   Other and unspecified coagulation defects (CMS-HCC) [D68.9] [D68.9]         Anticoagulation Episode Summary     INR check location     Preferred lab     Send INR reminders to     Comments Orthopedic Prophylaxis      Anticoagulation Care Providers     Provider Role Specialty Phone number    [facility] Responsible  [phone]    Serafin Fitzgerald M.D. Responsible Orthopaedics [phone]        Anticoagulation Patient Findings   Negatives Missed Doses, Extra Doses, Medication Changes, Antibiotic Use, Diet Changes, Dental/Other Procedures, Hospitalization, Bleeding Gums, Nose Bleeds, Blood in Urine, Blood in Stool, Any Bruising, Other Complaints        Pt is new to warfarin and new to [facility].  Discussed indication for warfarin therapy and INR goal range. Explained our services, hours of operation, warfarin therapy, potential SE, potential DI.. Discussed diet at length, with an emphasis on foods rich in vitamin K.  Discussed monitoring parameters, such as blood in urine, blood in stool, discussed what to do if a dose is missed, or suspected as missed.  Emphasized importance of compliance.  Discussed lifestyle choices of ETOH & smoking and its impact on therapy.           Pt denies any unusual s/s of bleeding, bruising, clotting or any changes to diet or medications.      Pt is a new referral for warfarin s/p L hip arthroscopy.  Pt anticipated to be anticoagulated for 4 weeks.  Pt surgeon is Dr. Fitzgerald.    Pt is therapeutic today. Pt is to continue with current warfarin dosing regimen.  Pt continues to smoke 1/4-1/2 ppd.  She is NOT interested in quitting.  Written materials given.    Follow up in 3 days.    Janelle LOCKWOOD  Filter, PHARMD

## 2017-03-14 NOTE — MR AVS SNAPSHOT
Manisha Rincon   3/14/2017 3:00 PM   Anticoagulation Visit   MRN: [id number]    Department:  [facility]   Dept Phone:  [phone]    Description:  Female : 1950   Provider:  Janelle LOCKWOOD Filter           Allergies as of 3/14/2017     No Known Allergies      You were diagnosed with     Other and unspecified coagulation defects (CMS-HCC)   [286.9.ICD-9-CM]         Vital Signs     Smoking Status                   Current Every Day Smoker           Basic Information     Date Of Birth Sex Race Ethnicity Preferred Language    1950 Female White Non- English      Your appointments     Mar 23, 2017  3:30 PM   Anti-Coag Routine with [facility]   [facility]    [address]  [address]  [place] [address] [phone]              Problem List              ICD-10-CM Priority Class Noted - Resolved    Chronic neck pain M54.2, G89.29   2009 - Present    Pain due to hip joint prosthesis (CMS-HCC) T84.84XA, Z96.649   2009 - Present    Hep C w/ coma, chronic (CMS-HCC) B18.2   2009 - Present    Anxiety F41.9   2009 - Present    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   2009 - Present    Gait instability R26.81   2009 - Present    Tobacco abuse Z72.0   2009 - Present    Vitamin d deficiency    2009 - Present    Hypoxemia R09.02   11/10/2015 - Present    Chronic pain G89.29   11/10/2015 - Present    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   11/10/2015 - Present    Pain medication agreement discussed Z79.899   2016 - Present    Diuretic-induced hypokalemia E87.6, T50.2X5A   2016 - Present    Burn T30.0   2016 - Present    Calcium blood increased E83.52   2016 - Present    Humpback M40.209   3/1/2016 - Present    Cervical stenosis of spine M48.02   2016 - Present    PNA (pneumonia) J18.9   2016 - Present    Hypotension I95.9   2016 - Present    Pneumonia J18.9   2016 - Present    Dehydration E86.0   8/20/2016 - Present    Macrocytic anemia D53.9   8/20/2016 - Present    Thrombocytopenia (CMS-HCC) D69.6   8/20/2016 - Present    Fall W19.XXXA High  8/20/2016 - Present    Left hip pain M25.552   3/7/2017 - Present    Other and unspecified coagulation defects (CMS-HCC) [D68.9] D68.9   3/13/2017 - Present      Health Maintenance        Date Due Completion Dates    IMM DTaP/Tdap/Td Vaccine (1 - Tdap) 8/26/1971 ---    IMM PNEUMOCOCCAL 19-64 (ADULT) MEDIUM RISK SERIES (1 of 1 - PPSV23) 8/26/1971 ---    PAP SMEAR 8/26/1973 ---    COLONOSCOPY 8/26/2002 ---    IMM ZOSTER VACCINE 8/26/2012 ---    MAMMOGRAM 10/25/2017 10/25/2016, 1/21/2015, 10/14/2013, 6/1/2011, 5/27/2009, 12/9/2008, 12/9/2008            Results     POCT Protime      Component    INR    2.5    Comment:     144 580 11 exp 01/2018 ic valid                        Current Immunizations     Influenza Vaccine Quad Inj (Pf) 3/8/2017 10:03 AM, 9/1/2015      Below and/or attached are the medications your provider expects you to take. Review all of your home medications and newly ordered medications with your provider and/or pharmacist. Follow medication instructions as directed by your provider and/or pharmacist. Please keep your medication list with you and share with your provider. Update the information when medications are discontinued, doses are changed, or new medications (including over-the-counter products) are added; and carry medication information at all times in the event of emergency situations     Allergies:  No Known Allergies          Medications  Valid as of: March 14, 2017 -  3:13 PM    Generic Name Brand Name Tablet Size Instructions for use    Alendronate Sodium (Tab) FOSAMAX 70 MG Take 70 mg by mouth every Friday.        ALPRAZolam (Tab) XANAX 0.5 MG Take 1/2  to 1 tablet by mouth twice as needed for anxiety. One month supply.        Capsaicin (Cream) ZOSTRIX 0.025 % Apply 1 Each to affected area(s) 3 times a day as needed.           Citalopram Hydrobromide (Tab) CELEXA 40 MG TAKE 1 TABLET BY MOUTH DAILY        Docusate Sodium (Tab) Docusate Sodium 100 MG Take 1 Tab by mouth 2 Times a Day.        Furosemide (Tab) LASIX 20 MG Take 20 mg by mouth 2 times a day as needed. Indications: Edema        Gabapentin (Cap) NEURONTIN 400 MG Take 1 Cap by mouth 3 times a day. as needed for nerve pain.        HydrOXYzine HCl (Tab) ATARAX 25 MG Take 1 Tab by mouth 3 times a day as needed for Anxiety.        Morphine Sulfate (Tab CR) MS CONTIN 60 MG Take 1 tablet by mouth twice per day as needed for pain.        Nicotine (PATCH 24 HR) NICOTINE STEP 1 21 MG/24HR APPLY 1 PATCH BY TRANSDERMAL ROUTE DAILY        OxyCODONE HCl (Tab) Oxycodone HCl 20 MG Take 1 Tab by mouth every 3 hours as needed for Moderate Pain or Severe Pain ((4-6)).        Potassium Chloride Emmanuelle CR (Tab CR) K-DUR 10 MEQ Take 10 mEq by mouth 2 times a day as needed (with lasix).        Prazosin HCl (Cap) MINIPRESS 2 MG Take 2 mg by mouth every evening.        Prazosin HCl (Cap) MINIPRESS 1 MG Take 1 mg by mouth every evening.        TiZANidine HCl (Tab) ZANAFLEX 4 MG Take 1/2 to 1 tablet by mouth every 6 hours as needed for muscle spasms.        TraZODone HCl (Tab) DESYREL 50 MG Take one tablet by mouth at bedtime as needed for insomnia.        Warfarin Sodium (Tab) COUMADIN 5 MG Take 1 Tab by mouth COUMADIN-DAILY.        .                 Medicines prescribed today were sent to:     [id number]  [place] - [address] [address]    Phone: [phone] Fax: [phone]    Open 24 Hours?: No      Medication refill instructions:       If your prescription bottle indicates you have medication refills left, it is not necessary to call your provider’s office. Please contact your pharmacy and they will refill your medication.    If your prescription bottle indicates you do not have any refills left, you may request refills at any time through one of the  following ways: The online [employer] system (except Urgent Care), by calling your provider’s office, or by asking your pharmacy to contact your provider’s office with a refill request. Medication refills are processed only during regular business hours and may not be available until the next business day. Your provider may request additional information or to have a follow-up visit with you prior to refilling your medication.   *Please Note: Medication refills are assigned a new Rx number when refilled electronically. Your pharmacy may indicate that no refills were authorized even though a new prescription for the same medication is available at the pharmacy. Please request the medicine by name with the pharmacy before contacting your provider for a refill.        Warfarin Dosing Calendar   March 2017 Details    Sun Mon Tue Wed Thu Fri Sat        1               2               3               4                 5               6               7               8               9               10               11                 12               13               14   2.5   5 mg   See details      15      5 mg         16      5 mg         17      5 mg         18      5 mg           19      5 mg         20      5 mg         21      5 mg         22      5 mg         23      5 mg         24               25                 26               27               28               29               30               31                 Date Details   03/14 This INR check   INR: 2.5   144 580 11 exp 01/2018 ic valid       Date of next INR:  3/23/2017         How to take your warfarin dose     To take:  5 mg Take 1 of the 5 mg tablets.              MyChart Status: Patient Declined        Quit Tobacco Information     Do you want to quit using tobacco?    Quitting tobacco decreases risks of cancer, heart and lung disease, increases life expectancy, improves sense of taste and smell, and increases spending money, among other benefits.    If  you are thinking about quitting, we can help.  • [facility] Quit Tobacco Program: 755-271-6238  o Program occurs weekly for four weeks and includes pharmacist consultation on products to support quitting smoking or chewing tobacco. A provider referral is needed for pharmacist consultation.  • Tobacco Users Help Hotline: 8-800-QUIT-NOW (858-3403) or https://[place].quitlogix.org/  o Free, confidential telephone and online coaching for [place] residents. Sessions are designed on a schedule that is convenient for you. Eligible clients receive free nicotine replacement therapy.  • Nationally: www.smokefree.gov  o Information and professional assistance to support both immediate and long-term needs as you become, and remain, a non-smoker. Smokefree.gov allows you to choose the help that best fits your needs.

## 2017-03-15 NOTE — TELEPHONE ENCOUNTER
Will continue approximately one month of anticoagulation for DVT prophylaxis after total joint as recommended at d/c from hospital.    Michael Bloch, MD  Anticoagulation Clinic    Cc:  Dr. Ken Fitzgerald

## 2017-03-20 ENCOUNTER — APPOINTMENT (OUTPATIENT)
Dept: MEDICAL GROUP | Facility: MEDICAL CENTER | Age: 67
End: 2017-03-20
Payer: MEDICAID

## 2017-03-21 ENCOUNTER — TELEPHONE (OUTPATIENT)
Dept: PHYSICAL MEDICINE AND REHAB | Facility: MEDICAL CENTER | Age: 67
End: 2017-03-21

## 2017-03-21 DIAGNOSIS — Z79.899 CONTROLLED SUBSTANCE AGREEMENT SIGNED: ICD-10-CM

## 2017-03-21 DIAGNOSIS — F41.9 ANXIETY: ICD-10-CM

## 2017-03-21 RX ORDER — ALPRAZOLAM 0.5 MG/1
TABLET ORAL
Qty: 50 TAB | Refills: 0 | Status: SHIPPED | OUTPATIENT
Start: 2017-03-21 | End: 2020-08-27

## 2017-03-21 NOTE — TELEPHONE ENCOUNTER
I left message to Aggie @ Dr. Fitzgerald's office to please call me back for clarification fax cover sheet regarding medication.

## 2017-03-21 NOTE — PROGRESS NOTES
Received refill request for Xanax. The patient was last seen in 1/2017, records reviewed. Reviewed intercurrent medical issues, patient underwent left total hip arthroplasty 3/7/2017, postoperative pain management per orthopedics, reviewed records. Reviewed  profile 3/21/2017. Updated prescription for Xanax, continuing taper schedule #50, refill zero, one month's supply, this is a decrease in quantity.

## 2017-03-21 NOTE — TELEPHONE ENCOUNTER
Aggie from [facility]/Dr. Fitzgerald's office called and said Dr. Fitzgerald is out of office and he did not want to prescribe Xanax for Manisha and Aggie said she is out. Aggie said Manisha was supposed to go to psych doctor to get Xanax but that doctor did not want to fill without talking with Dr. Fitzgerald. I asked Aggie to send us records of surgery notes and post op visit and I would see what Dr. Beard will do for her.

## 2017-03-21 NOTE — TELEPHONE ENCOUNTER
I left message to Aggie @ [facility] that Dr. Beard wrote Rx for Xanax and I will fax to pharmacy for Manisha.

## 2017-03-23 ENCOUNTER — ANTICOAGULATION VISIT (OUTPATIENT)
Dept: MEDICAL GROUP | Facility: MEDICAL CENTER | Age: 67
End: 2017-03-23
Payer: MEDICAID

## 2017-03-23 VITALS — HEART RATE: 79 BPM | SYSTOLIC BLOOD PRESSURE: 101 MMHG | DIASTOLIC BLOOD PRESSURE: 68 MMHG

## 2017-03-23 DIAGNOSIS — D68.9 OTHER AND UNSPECIFIED COAGULATION DEFECTS: ICD-10-CM

## 2017-03-23 LAB — INR PPP: >8 (ref 2–3.5)

## 2017-03-23 PROCEDURE — 85610 PROTHROMBIN TIME: CPT | Performed by: PHYSICIAN ASSISTANT

## 2017-03-23 NOTE — MR AVS SNAPSHOT
Manisha Rincon   3/23/2017 3:30 PM   Anticoagulation Visit   MRN: [id number]    Department:  [facility]   Dept Phone:  [phone]    Description:  Female : 1950   Provider:  Janelle LOCKWOOD Filter           Allergies as of 3/23/2017     No Known Allergies      You were diagnosed with     Other and unspecified coagulation defects (CMS-HCC)   [286.9.ICD-9-CM]         Vital Signs     Blood Pressure Pulse Smoking Status             101/68 mmHg 79 Current Every Day Smoker         Basic Information     Date Of Birth Sex Race Ethnicity Preferred Language    1950 Female White Non- English      Your appointments     Mar 23, 2017  3:30 PM   Anti-Coag Routine with Jaenlle MANDIE Roa   [facility]    [address]  [address]  [place] [id number] [phone]            Mar 27, 2017 10:30 AM   Anti-Coag Routine with 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   2009 - Present    Gait instability R26.81   2009 - Present    Tobacco abuse Z72.0   2009 - Present    Vitamin d deficiency    2009 - Present    Hypoxemia R09.02   11/10/2015 - Present    Chronic pain G89.29   11/10/2015 - Present    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   11/10/2015 - Present    Pain medication agreement discussed Z79.899   2016 - Present    Diuretic-induced hypokalemia E87.6, T50.2X5A   2016 - Present    Burn T30.0   2016 - Present    Calcium blood increased E83.52   2016 - Present    [facility]   3/1/2016 - Present    Cervical stenosis of spine M48.02   4/8/2016 - Present    PNA (pneumonia) J18.9   4/13/2016 - Present    Hypotension I95.9   4/13/2016 - Present    Pneumonia J18.9   8/20/2016 - Present    Dehydration E86.0   8/20/2016 - Present    Macrocytic anemia D53.9   8/20/2016 - Present    Thrombocytopenia (CMS-HCC) D69.6   8/20/2016 - Present    Fall W19.XXXA High  8/20/2016 - Present    Left hip pain M25.552   3/7/2017 - Present    Other and unspecified coagulation defects (CMS-HCC) [D68.9] D68.9   3/13/2017 - Present      Health Maintenance        Date Due Completion Dates    IMM DTaP/Tdap/Td Vaccine (1 - Tdap) 8/26/1971 ---    IMM PNEUMOCOCCAL 19-64 (ADULT) MEDIUM RISK SERIES (1 of 1 - PPSV23) 8/26/1971 ---    PAP SMEAR 8/26/1973 ---    COLONOSCOPY 8/26/2002 ---    IMM ZOSTER VACCINE 8/26/2012 ---    MAMMOGRAM 10/25/2017 10/25/2016, 1/21/2015, 10/14/2013, 6/1/2011, 5/27/2009, 12/9/2008, 12/9/2008            Results     POCT Protime      Component    INR    >8.0    Comment:     139 816 11 exp 11/2017 ic valid                        Current Immunizations     Influenza Vaccine Quad Inj (Pf) 3/8/2017 10:03 AM, 9/1/2015      Below and/or attached are the medications your provider expects you to take. Review all of your home medications and newly ordered medications with your provider and/or pharmacist. Follow medication instructions as directed by your provider and/or pharmacist. Please keep your medication list with you and share with your provider. Update the information when medications are discontinued, doses are changed, or new medications (including over-the-counter products) are added; and carry medication information at all times in the event of emergency situations     Allergies:  No Known Allergies          Medications  Valid as of: March 23, 2017 -  3:29 PM    Generic Name Brand Name Tablet Size Instructions for use    Alendronate Sodium (Tab) FOSAMAX 70 MG Take 70 mg by mouth every  Friday.        ALPRAZolam (Tab) XANAX 0.5 MG Take 1/2  to 1 tablet by mouth twice as needed for anxiety. One month supply.        Capsaicin (Cream) ZOSTRIX 0.025 % Apply 1 Each to affected area(s) 3 times a day as needed.        Citalopram Hydrobromide (Tab) CELEXA 40 MG TAKE 1 TABLET BY MOUTH DAILY        Docusate Sodium (Tab) Docusate Sodium 100 MG Take 1 Tab by mouth 2 Times a Day.        Furosemide (Tab) LASIX 20 MG Take 20 mg by mouth 2 times a day as needed. Indications: Edema        Gabapentin (Cap) NEURONTIN 400 MG Take 1 Cap by mouth 3 times a day. as needed for nerve pain.        HydrOXYzine HCl (Tab) ATARAX 25 MG Take 1 Tab by mouth 3 times a day as needed for Anxiety.        Morphine Sulfate (Tab CR) MS CONTIN 60 MG Take 1 tablet by mouth twice per day as needed for pain.        Nicotine (PATCH 24 HR) NICOTINE STEP 1 21 MG/24HR APPLY 1 PATCH BY TRANSDERMAL ROUTE DAILY        OxyCODONE HCl (Tab) Oxycodone HCl 20 MG Take 1 Tab by mouth every 3 hours as needed for Moderate Pain or Severe Pain ((4-6)).        Potassium Chloride Emmanuelle CR (Tab CR) K-DUR 10 MEQ Take 10 mEq by mouth 2 times a day as needed (with lasix).        Prazosin HCl (Cap) MINIPRESS 2 MG Take 2 mg by mouth every evening.        Prazosin HCl (Cap) MINIPRESS 1 MG Take 1 mg by mouth every evening.        TiZANidine HCl (Tab) ZANAFLEX 4 MG Take 1/2 to 1 tablet by mouth every 6 hours as needed for muscle spasms.        TraZODone HCl (Tab) DESYREL 50 MG Take one tablet by mouth at bedtime as needed for insomnia.        Warfarin Sodium (Tab) COUMADIN 5 MG Take 1 Tab by mouth COUMADIN-DAILY.        .                 Medicines prescribed today were sent to:     [facility]/PHARMACY #[id number] - [place] - [address] [place] [address]    Phone: [phone] Fax: [phone]    Open 24 Hours?: No      Medication refill instructions:       If your prescription bottle indicates you have medication refills left, it is not necessary to call your  provider’s office. Please contact your pharmacy and they will refill your medication.    If your prescription bottle indicates you do not have any refills left, you may request refills at any time through one of the following ways: The online [employer] system (except Urgent Care), by calling your provider’s office, or by asking your pharmacy to contact your provider’s office with a refill request. Medication refills are processed only during regular business hours and may not be available until the next business day. Your provider may request additional information or to have a follow-up visit with you prior to refilling your medication.   *Please Note: Medication refills are assigned a new Rx number when refilled electronically. Your pharmacy may indicate that no refills were authorized even though a new prescription for the same medication is available at the pharmacy. Please request the medicine by name with the pharmacy before contacting your provider for a refill.        Warfarin Dosing Calendar   March 2017 Details    Sun Mon Tue Wed Thu Fri Sat        1               2               3               4                 5               6               7               8               9               10               11                 12               13               14               15               16               17               18                 19               20               21               22               23   >8.0   5 mg   See details      24      Hold         25      Hold           26      Hold         27      5 mg         28               29               30               31                 Date Details   03/23 This INR check   INR: >8.0   139 816 11 exp 11/2017 ic valid       Date of next INR:  3/27/2017         How to take your warfarin dose     To take:  5 mg Take 1 of the 5 mg tablets.    Hold Do not take your warfarin dose. See the Details table to the right for additional instructions.                      Susanne Status: Patient Declined        Quit Tobacco Information     Do you want to quit using tobacco?    Quitting tobacco decreases risks of cancer, heart and lung disease, increases life expectancy, improves sense of taste and smell, and increases spending money, among other benefits.    If you are thinking about quitting, we can help.  • [facility] Quit Tobacco Program: [phone]  o Program occurs weekly for four weeks and includes pharmacist consultation on products to support quitting smoking or chewing tobacco. A provider referral is needed for pharmacist consultation.  • Tobacco Users Help Hotline: 5-860-QUIT-NOW (985-6335) or https://[place].quitlogix.org/  o Free, confidential telephone and online coaching for [place] residents. Sessions are designed on a schedule that is convenient for you. Eligible clients receive free nicotine replacement therapy.  • Nationally: www.smokefree.gov  o Information and professional assistance to support both immediate and long-term needs as you become, and remain, a non-smoker. Smokefree.gov allows you to choose the help that best fits your needs.

## 2017-03-23 NOTE — PROGRESS NOTES
Anticoagulation Summary as of 3/23/2017     INR goal 2.0-3.0   Selected INR >8.0 (3/23/2017)   The INR is not numeric and cannot be flagged as normal/abnormal.   Maintenance plan 5 mg (5 mg x 1) every day   Weekly total 35 mg   Plan last modified BROOKE MathewD (3/13/2017)   Next INR check 3/27/2017   Target end date 4/6/2017    Indications   Other and unspecified coagulation defects (CMS-HCC) [D68.9] [D68.9]         Anticoagulation Episode Summary     INR check location     Preferred lab     Send INR reminders to     Comments Orthopedic Prophylaxis      Anticoagulation Care Providers     Provider Role Specialty Phone number    [facility] Responsible  [phone]    Serafin Fitzgerald M.D. Responsible Orthopaedics [phone]        Anticoagulation Patient Findings    Pt is critically supratherapeutic today.  She already took her warfarin today.  Will HOLD warfarin for 3 days, then retest INR. Confirmed no duplicate doses and no ETOH.  PT is on an cephalexin. Follow up in 4 days.    Janelle Roa, PHARMD

## 2017-03-27 ENCOUNTER — APPOINTMENT (OUTPATIENT)
Dept: MEDICAL GROUP | Facility: MEDICAL CENTER | Age: 67
End: 2017-03-27
Payer: MEDICAID

## 2017-03-27 DIAGNOSIS — G47.09 OTHER INSOMNIA: ICD-10-CM

## 2017-03-27 DIAGNOSIS — G47.00 INSOMNIA, UNSPECIFIED TYPE: ICD-10-CM

## 2017-03-27 RX ORDER — TRAZODONE HYDROCHLORIDE 50 MG/1
TABLET ORAL
Qty: 30 TAB | Refills: 5 | Status: SHIPPED | OUTPATIENT
Start: 2017-03-27 | End: 2021-07-31

## 2017-03-28 ENCOUNTER — ANTICOAGULATION VISIT (OUTPATIENT)
Dept: MEDICAL GROUP | Facility: MEDICAL CENTER | Age: 67
End: 2017-03-28
Payer: MEDICAID

## 2017-03-28 VITALS — SYSTOLIC BLOOD PRESSURE: 126 MMHG | HEART RATE: 76 BPM | DIASTOLIC BLOOD PRESSURE: 60 MMHG

## 2017-03-28 DIAGNOSIS — D68.9 OTHER AND UNSPECIFIED COAGULATION DEFECTS: ICD-10-CM

## 2017-03-28 LAB — INR PPP: 1.3 (ref 2–3.5)

## 2017-03-28 PROCEDURE — 85610 PROTHROMBIN TIME: CPT | Performed by: FAMILY MEDICINE

## 2017-03-28 NOTE — MR AVS SNAPSHOT
Manisha Rincon   3/28/2017 10:15 AM   Anticoagulation Visit   MRN: [id number]    Department:  [facility]   Dept Phone:  [phone]    Description:  Female : 1950   Provider:  Janelle LOCKWOOD Filter           Allergies as of 3/28/2017     No Known Allergies      You were diagnosed with     Other and unspecified coagulation defects (CMS-HCC)   [286.9.ICD-9-CM]         Vital Signs     Blood Pressure Pulse Smoking Status             126/60 mmHg 76 Current Every Day Smoker         Basic Information     Date Of Birth Sex Race Ethnicity Preferred Language    1950 Female White Non- English      Your appointments     Mar 30, 2017 10:00 AM   [facility]ag Routine with [facility]   [facility]    [address]  [address]  [place] [id number] [phone]              Problem List              ICD-10-CM Priority Class Noted - Resolved    Chronic neck pain M54.2, G89.29   2009 - Present    Pain due to hip joint prosthesis (CMS-HCC) T84.84XA, Z96.649   2009 - Present    Hep C w/ coma, chronic (CMS-HCC) B18.2   2009 - Present    Anxiety F41.9   2009 - Present    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   2009 - Present    Gait instability R26.81   2009 - Present    Tobacco abuse Z72.0   2009 - Present    Vitamin d deficiency    2009 - Present    Hypoxemia R09.02   11/10/2015 - Present    Chronic pain G89.29   11/10/2015 - Present    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   11/10/2015 - Present    Pain medication agreement discussed Z79.899   2016 - Present    Diuretic-induced hypokalemia E87.6, T50.2X5A   2016 - Present    Burn T30.0   2016 - Present    Calcium blood increased E83.52   2016 - Present    [place] M40.209   3/1/2016 - Present    Cervical stenosis of spine M48.02   2016 - Present    PNA (pneumonia) J18.9   2016 - Present    Hypotension I95.9   2016 - Present    Pneumonia J18.9    8/20/2016 - Present    Dehydration E86.0   8/20/2016 - Present    Macrocytic anemia D53.9   8/20/2016 - Present    Thrombocytopenia (CMS-HCC) D69.6   8/20/2016 - Present    Fall W19.XXXA High  8/20/2016 - Present    Left hip pain M25.552   3/7/2017 - Present    Other and unspecified coagulation defects (CMS-HCC) [D68.9] D68.9   3/13/2017 - Present      Health Maintenance        Date Due Completion Dates    IMM DTaP/Tdap/Td Vaccine (1 - Tdap) 8/26/1971 ---    IMM PNEUMOCOCCAL 19-64 (ADULT) MEDIUM RISK SERIES (1 of 1 - PPSV23) 8/26/1971 ---    PAP SMEAR 8/26/1973 ---    COLONOSCOPY 8/26/2002 ---    IMM ZOSTER VACCINE 8/26/2012 ---    MAMMOGRAM 10/25/2017 10/25/2016, 1/21/2015, 10/14/2013, 6/1/2011, 5/27/2009, 12/9/2008, 12/9/2008            Results     POCT Protime      Component    INR    1.3    Comment:     139 816 11 exp 11/2017 ic valid                        Current Immunizations     Influenza Vaccine Quad Inj (Pf) 3/8/2017 10:03 AM, 9/1/2015      Below and/or attached are the medications your provider expects you to take. Review all of your home medications and newly ordered medications with your provider and/or pharmacist. Follow medication instructions as directed by your provider and/or pharmacist. Please keep your medication list with you and share with your provider. Update the information when medications are discontinued, doses are changed, or new medications (including over-the-counter products) are added; and carry medication information at all times in the event of emergency situations     Allergies:  No Known Allergies          Medications  Valid as of: March 28, 2017 - 10:19 AM    Generic Name Brand Name Tablet Size Instructions for use    Alendronate Sodium (Tab) FOSAMAX 70 MG Take 70 mg by mouth every Friday.        ALPRAZolam (Tab) XANAX 0.5 MG Take 1/2  to 1 tablet by mouth twice as needed for anxiety. One month supply.        Capsaicin (Cream) ZOSTRIX 0.025 % Apply 1 Each to affected area(s) 3  times a day as needed.        Citalopram Hydrobromide (Tab) CELEXA 40 MG TAKE 1 TABLET BY MOUTH DAILY        Docusate Sodium (Tab) Docusate Sodium 100 MG Take 1 Tab by mouth 2 Times a Day.        Furosemide (Tab) LASIX 20 MG Take 20 mg by mouth 2 times a day as needed. Indications: Edema        Gabapentin (Cap) NEURONTIN 400 MG Take 1 Cap by mouth 3 times a day. as needed for nerve pain.        HydrOXYzine HCl (Tab) ATARAX 25 MG Take 1 Tab by mouth 3 times a day as needed for Anxiety.        Morphine Sulfate (Tab CR) MS CONTIN 60 MG Take 1 tablet by mouth twice per day as needed for pain.        Nicotine (PATCH 24 HR) NICOTINE STEP 1 21 MG/24HR APPLY 1 PATCH BY TRANSDERMAL ROUTE DAILY        OxyCODONE HCl (Tab) Oxycodone HCl 20 MG Take 1 Tab by mouth every 3 hours as needed for Moderate Pain or Severe Pain ((4-6)).        Potassium Chloride Emmanuelle CR (Tab CR) K-DUR 10 MEQ Take 10 mEq by mouth 2 times a day as needed (with lasix).        Prazosin HCl (Cap) MINIPRESS 2 MG Take 2 mg by mouth every evening.        Prazosin HCl (Cap) MINIPRESS 1 MG Take 1 mg by mouth every evening.        TiZANidine HCl (Tab) ZANAFLEX 4 MG Take 1/2 to 1 tablet by mouth every 6 hours as needed for muscle spasms.        TraZODone HCl (Tab) DESYREL 50 MG Take one tablet by mouth at bedtime as needed for insomnia.        Warfarin Sodium (Tab) COUMADIN 5 MG Take 1 Tab by mouth COUMADIN-DAILY.        .                 Medicines prescribed today were sent to:     [facility]/PHARMACY #[id number] - [place], [place] - [address] [address]    Phone: [phone] Fax: [phone]    Open 24 Hours?: No      Medication refill instructions:       If your prescription bottle indicates you have medication refills left, it is not necessary to call your provider’s office. Please contact your pharmacy and they will refill your medication.    If your prescription bottle indicates you do not have any refills left, you may request refills at any time  through one of the following ways: The online [employer] system (except Urgent Care), by calling your provider’s office, or by asking your pharmacy to contact your provider’s office with a refill request. Medication refills are processed only during regular business hours and may not be available until the next business day. Your provider may request additional information or to have a follow-up visit with you prior to refilling your medication.   *Please Note: Medication refills are assigned a new Rx number when refilled electronically. Your pharmacy may indicate that no refills were authorized even though a new prescription for the same medication is available at the pharmacy. Please request the medicine by name with the pharmacy before contacting your provider for a refill.        Warfarin Dosing Calendar   March 2017 Details    Sun Mon Tue Wed Thu Fri Sat        1               2               3               4                 5               6               7               8               9               10               11                 12               13               14               15               16               17               18                 19               20               21               22               23               24               25                 26               27               28   1.3   10 mg   See details      29      7.5 mg         30      5 mg         31                 Date Details   03/28 This INR check   INR: 1.3   139 816 11 exp 11/2017 ic valid       Date of next INR:  3/30/2017         How to take your warfarin dose     To take:  5 mg Take 1 of the 5 mg tablets.    To take:  7.5 mg Take 1.5 of the 5 mg tablets.    To take:  10 mg Take 2 of the 5 mg tablets.              MyChart Status: Patient Declined        Quit Tobacco Information     Do you want to quit using tobacco?    Quitting tobacco decreases risks of cancer, heart and lung disease, increases life expectancy,  improves sense of taste and smell, and increases spending money, among other benefits.    If you are thinking about quitting, we can help.  • [facility] Quit Tobacco Program: 483.487.2480  o Program occurs weekly for four weeks and includes pharmacist consultation on products to support quitting smoking or chewing tobacco. A provider referral is needed for pharmacist consultation.  • Tobacco Users Help Hotline: 9-800QUIT-NOW (699-0611) or https://[place].quitlogix.org/  o Free, confidential telephone and online coaching for [place] residents. Sessions are designed on a schedule that is convenient for you. Eligible clients receive free nicotine replacement therapy.  • Nationally: www.smokefree.gov  o Information and professional assistance to support both immediate and long-term needs as you become, and remain, a non-smoker. Smokefree.gov allows you to choose the help that best fits your needs.

## 2017-03-30 ENCOUNTER — ANTICOAGULATION VISIT (OUTPATIENT)
Dept: MEDICAL GROUP | Facility: MEDICAL CENTER | Age: 67
End: 2017-03-30
Payer: MEDICAID

## 2017-03-30 VITALS — DIASTOLIC BLOOD PRESSURE: 64 MMHG | SYSTOLIC BLOOD PRESSURE: 104 MMHG | HEART RATE: 73 BPM

## 2017-03-30 DIAGNOSIS — D68.9 OTHER AND UNSPECIFIED COAGULATION DEFECTS: ICD-10-CM

## 2017-03-30 LAB — INR PPP: 3.4 (ref 2–3.5)

## 2017-03-30 PROCEDURE — 85610 PROTHROMBIN TIME: CPT | Performed by: FAMILY MEDICINE

## 2017-03-30 NOTE — MR AVS SNAPSHOT
Manisha Rincon   3/30/2017 10:00 AM   Anticoagulation Visit   MRN: [id number]    Department:  [facility]   Dept Phone:  [phone]    Description:  Female : 1950   Provider:  Janelle LOCKWOOD Filter           Allergies as of 3/30/2017     No Known Allergies      You were diagnosed with     Other and unspecified coagulation defects (CMS-[facility])   [286.9.ICD-9-CM]         Vital Signs     Smoking Status                   Current Every Day Smoker           Basic Information     Date Of Birth Sex Race Ethnicity Preferred Language    1950 Female White Non- English      Your appointments     Mar 30, 2017 10:00 AM   Anti-Coag Routine with Janelle LOCKWOOD Filter   [facility] [place] [facility] ([place]    [address]  [address]  [place] [id number] [phone]            2017  9:45 AM   Anti-Coag Routine with [facility] PAV PHARMACIST   [facility] ([place])    [address]  [address]  [place] [id number] [phone]              Problem List              ICD-10-CM Priority Class Noted - Resolved    Chronic neck pain M54.2, G89.29   2009 - Present    Pain due to hip joint prosthesis (CMS-HCC) T84.84XA, Z96.649   2009 - Present    Hep C w/ coma, chronic (CMS-HCC) B18.2   2009 - Present    Anxiety F41.9   2009 - Present    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   2009 - Present    Gait instability R26.81   2009 - Present    Tobacco abuse Z72.0   2009 - Present    Vitamin d deficiency    2009 - Present    Hypoxemia R09.02   11/10/2015 - Present    Chronic pain G89.29   11/10/2015 - Present    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   11/10/2015 - Present    Pain medication agreement discussed Z79.899   2016 - Present    Diuretic-induced hypokalemia E87.6, T50.2X5A   2016 - Present    Burn T30.0   2016 - Present    Calcium blood increased E83.52   2016 - Present    Humpback M40.209   3/1/2016 -  Present    Cervical stenosis of spine M48.02   4/8/2016 - Present    PNA (pneumonia) J18.9   4/13/2016 - Present    Hypotension I95.9   4/13/2016 - Present    Pneumonia J18.9   8/20/2016 - Present    Dehydration E86.0   8/20/2016 - Present    Macrocytic anemia D53.9   8/20/2016 - Present    Thrombocytopenia (CMS-HCC) D69.6   8/20/2016 - Present    Fall W19.XXXA High  8/20/2016 - Present    Left hip pain M25.552   3/7/2017 - Present    Other and unspecified coagulation defects (CMS-HCC) [D68.9] D68.9   3/13/2017 - Present      Health Maintenance        Date Due Completion Dates    IMM DTaP/Tdap/Td Vaccine (1 - Tdap) 8/26/1971 ---    IMM PNEUMOCOCCAL 19-64 (ADULT) MEDIUM RISK SERIES (1 of 1 - PPSV23) 8/26/1971 ---    PAP SMEAR 8/26/1973 ---    COLONOSCOPY 8/26/2002 ---    IMM ZOSTER VACCINE 8/26/2012 ---    MAMMOGRAM 10/25/2017 10/25/2016, 1/21/2015, 10/14/2013, 6/1/2011, 5/27/2009, 12/9/2008, 12/9/2008            Results     POCT Protime      Component    INR    3.4    Comment:     139 816 11 exp 11/2017 ic valid                        Current Immunizations     Influenza Vaccine Quad Inj (Pf) 3/8/2017 10:03 AM, 9/1/2015      Below and/or attached are the medications your provider expects you to take. Review all of your home medications and newly ordered medications with your provider and/or pharmacist. Follow medication instructions as directed by your provider and/or pharmacist. Please keep your medication list with you and share with your provider. Update the information when medications are discontinued, doses are changed, or new medications (including over-the-counter products) are added; and carry medication information at all times in the event of emergency situations     Allergies:  No Known Allergies          Medications  Valid as of: March 30, 2017 -  9:49 AM    Generic Name Brand Name Tablet Size Instructions for use    Alendronate Sodium (Tab) FOSAMAX 70 MG Take 70 mg by mouth every Friday.         ALPRAZolam (Tab) XANAX 0.5 MG Take 1/2  to 1 tablet by mouth twice as needed for anxiety. One month supply.        Capsaicin (Cream) ZOSTRIX 0.025 % Apply 1 Each to affected area(s) 3 times a day as needed.        Citalopram Hydrobromide (Tab) CELEXA 40 MG TAKE 1 TABLET BY MOUTH DAILY        Docusate Sodium (Tab) Docusate Sodium 100 MG Take 1 Tab by mouth 2 Times a Day.        Furosemide (Tab) LASIX 20 MG Take 20 mg by mouth 2 times a day as needed. Indications: Edema        Gabapentin (Cap) NEURONTIN 400 MG Take 1 Cap by mouth 3 times a day. as needed for nerve pain.        HydrOXYzine HCl (Tab) ATARAX 25 MG Take 1 Tab by mouth 3 times a day as needed for Anxiety.        Morphine Sulfate (Tab CR) MS CONTIN 60 MG Take 1 tablet by mouth twice per day as needed for pain.        Nicotine (PATCH 24 HR) NICOTINE STEP 1 21 MG/24HR APPLY 1 PATCH BY TRANSDERMAL ROUTE DAILY        OxyCODONE HCl (Tab) Oxycodone HCl 20 MG Take 1 Tab by mouth every 3 hours as needed for Moderate Pain or Severe Pain ((4-6)).        Potassium Chloride Emmanuelle CR (Tab CR) K-DUR 10 MEQ Take 10 mEq by mouth 2 times a day as needed (with lasix).        Prazosin HCl (Cap) MINIPRESS 2 MG Take 2 mg by mouth every evening.        Prazosin HCl (Cap) MINIPRESS 1 MG Take 1 mg by mouth every evening.        TiZANidine HCl (Tab) ZANAFLEX 4 MG Take 1/2 to 1 tablet by mouth every 6 hours as needed for muscle spasms.        TraZODone HCl (Tab) DESYREL 50 MG Take one tablet by mouth at bedtime as needed for insomnia.        Warfarin Sodium (Tab) COUMADIN 5 MG Take 1 Tab by mouth COUMADIN-DAILY.        .                 Medicines prescribed today were sent to:     [facility]/PHARMACY #[id number] - [place], [place] - [id number] [place] [address]    Phone: [phone] Fax: [phone]    Open 24 Hours?: No      Medication refill instructions:       If your prescription bottle indicates you have medication refills left, it is not necessary to call your provider’s  office. Please contact your pharmacy and they will refill your medication.    If your prescription bottle indicates you do not have any refills left, you may request refills at any time through one of the following ways: The online [employer] system (except Urgent Care), by calling your provider’s office, or by asking your pharmacy to contact your provider’s office with a refill request. Medication refills are processed only during regular business hours and may not be available until the next business day. Your provider may request additional information or to have a follow-up visit with you prior to refilling your medication.   *Please Note: Medication refills are assigned a new Rx number when refilled electronically. Your pharmacy may indicate that no refills were authorized even though a new prescription for the same medication is available at the pharmacy. Please request the medicine by name with the pharmacy before contacting your provider for a refill.        Warfarin Dosing Calendar   March 2017 Details    Sun Mon Tue Wed Thu Fri Sat        1               2               3               4                 5               6               7               8               9               10               11                 12               13               14               15               16               17               18                 19               20               21               22               23               24               25                 26               27               28               29               30   3.4   5 mg   See details      31      5 mg           Date Details   03/30 This INR check   INR: 3.4   139 816 11 exp 11/2017 ic valid      Date of next INR: No date specified         How to take your warfarin dose     To take:  5 mg Take 1 of the 5 mg tablets.              MyChart Status: Patient Declined        Quit Tobacco Information     Do you want to quit using tobacco?    Quitting  tobacco decreases risks of cancer, heart and lung disease, increases life expectancy, improves sense of taste and smell, and increases spending money, among other benefits.    If you are thinking about quitting, we can help.  • [facility] Quit Tobacco Program: [phone]  o Program occurs weekly for four weeks and includes pharmacist consultation on products to support quitting smoking or chewing tobacco. A provider referral is needed for pharmacist consultation.  • Tobacco Users Help Hotline: 8-243-QUIT-NOW (255-2804) or https://[place].quitlogix.org/  o Free, confidential telephone and online coaching for [place] residents. Sessions are designed on a schedule that is convenient for you. Eligible clients receive free nicotine replacement therapy.  • Nationally: www.smokefree.gov  o Information and professional assistance to support both immediate and long-term needs as you become, and remain, a non-smoker. Smokefree.gov allows you to choose the help that best fits your needs.

## 2017-03-30 NOTE — PROGRESS NOTES
Anticoagulation Summary as of 3/30/2017     INR goal 2.0-3.0   Selected INR 3.4! (3/30/2017)   Maintenance plan 5 mg (5 mg x 1) every day   Weekly total 35 mg   Plan last modified BROOKE MathewD (3/13/2017)   Next INR check    Target end date 4/6/2017    Indications   Other and unspecified coagulation defects (CMS-HCC) [D68.9] [D68.9]         Anticoagulation Episode Summary     INR check location     Preferred lab     Send INR reminders to     Comments Orthopedic Prophylaxis      Anticoagulation Care Providers     Provider Role Specialty Phone number    [facility] Responsible  [phone]    Serafin Fitzgerald M.D. Responsible Orthopaedics [phone]        Pt is supra therapeutic today.  She insists on taking it in the mornings.  Will continue with 35mg/week. Pt denies any unusual s/s of bleeding, bruising, clotting or any changes to diet or medications.  Follow up in 1 weeks.    BROOKE BowdenD        Target end date 4-6-2017

## 2017-04-06 ENCOUNTER — ANTICOAGULATION VISIT (OUTPATIENT)
Dept: MEDICAL GROUP | Facility: MEDICAL CENTER | Age: 67
End: 2017-04-06
Payer: MEDICAID

## 2017-04-06 DIAGNOSIS — D68.9 OTHER AND UNSPECIFIED COAGULATION DEFECTS: ICD-10-CM

## 2017-04-06 LAB — INR PPP: 6.1 (ref 2–3.5)

## 2017-04-06 PROCEDURE — 85610 PROTHROMBIN TIME: CPT | Performed by: FAMILY MEDICINE

## 2017-04-06 NOTE — PROGRESS NOTES
Anticoagulation Summary as of 4/6/2017     INR goal 2.0-3.0   Selected INR 6.1! (4/6/2017)   Maintenance plan 2.5 mg (5 mg x 0.5) on Mon, Wed, Fri; 5 mg (5 mg x 1) all other days   Weekly total 27.5 mg   Plan last modified Janelle Roa PHARMD (4/6/2017)   Next INR check 4/11/2017   Target end date 4/6/2017    Indications   Other and unspecified coagulation defects (CMS-HCC) [D68.9] [D68.9]         Anticoagulation Episode Summary     INR check location     Preferred lab     Send INR reminders to     Comments Orthopedic Prophylaxis      Anticoagulation Care Providers     Provider Role Specialty Phone number    [facility] Responsible  [phone]    Serafin Fitzgerald M.D. Responsible Orthopaedics [phone]        Pt is supra therapeutic today.  She admits to taking her Ensure twice daily, but only eating maybe two doughnut holes in addition to that.  Will HOLD warfarin for 2 days, and decrease weekly dose by 25%. Follow up in 4 days.    Janelle Roa, BROOKED

## 2017-04-06 NOTE — MR AVS SNAPSHOT
Manisha Rincon   2017 9:45 AM   Anticoagulation Visit   MRN: [id number]    Department:  [facility]   Dept Phone:  [phone]    Description:  Female : 1950   Provider:  Janelle LOCKWOOD Filter           Allergies as of 2017     No Known Allergies      You were diagnosed with     Other and unspecified coagulation defects (CMS-[facility])   [286.9.ICD-9-CM]         Vital Signs     Smoking Status                   Current Every Day Smoker           Basic Information     Date Of Birth Sex Race Ethnicity Preferred Language    1950 Female White Non- English      Your appointments     2017  9:45 AM   Anti-Coag Routine with Janelle LOCKWOOD Filter   [facility] [place] [facility] ([place]    [address]  [address]  [place] [id number] [phone]            2017 11:15 AM   Anti-Coag Routine with [facility] PAV PHARMACIST   [facility] ([place])    [address]  [address]  [place] [id number] [phone]              Problem List              ICD-10-CM Priority Class Noted - Resolved    Chronic neck pain M54.2, G89.29   2009 - Present    Pain due to hip joint prosthesis (CMS-HCC) T84.84XA, Z96.649   2009 - Present    Hep C w/ coma, chronic (CMS-HCC) B18.2   2009 - Present    Anxiety F41.9   2009 - Present    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   2009 - Present    Gait instability R26.81   2009 - Present    Tobacco abuse Z72.0   2009 - Present    Vitamin d deficiency    2009 - Present    Hypoxemia R09.02   11/10/2015 - Present    Chronic pain G89.29   11/10/2015 - Present    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   11/10/2015 - Present    Pain medication agreement discussed Z79.899   2016 - Present    Diuretic-induced hypokalemia E87.6, T50.2X5A   2016 - Present    Burn T30.0   2016 - Present    Calcium blood increased E83.52   2016 - Present    Humpback M40.209   3/1/2016 - Present       Cervical stenosis of spine M48.02   4/8/2016 - Present    PNA (pneumonia) J18.9   4/13/2016 - Present    Hypotension I95.9   4/13/2016 - Present    Pneumonia J18.9   8/20/2016 - Present    Dehydration E86.0   8/20/2016 - Present    Macrocytic anemia D53.9   8/20/2016 - Present    Thrombocytopenia (CMS-HCC) D69.6   8/20/2016 - Present    Fall W19.XXXA High  8/20/2016 - Present    Left hip pain M25.552   3/7/2017 - Present    Other and unspecified coagulation defects (CMS-HCC) [D68.9] D68.9   3/13/2017 - Present      Health Maintenance        Date Due Completion Dates    IMM DTaP/Tdap/Td Vaccine (1 - Tdap) 8/26/1971 ---    IMM PNEUMOCOCCAL 19-64 (ADULT) MEDIUM RISK SERIES (1 of 1 - PPSV23) 8/26/1971 ---    PAP SMEAR 8/26/1973 ---    COLONOSCOPY 8/26/2002 ---    IMM ZOSTER VACCINE 8/26/2012 ---    MAMMOGRAM 10/25/2017 10/25/2016, 1/21/2015, 10/14/2013, 6/1/2011, 5/27/2009, 12/9/2008, 12/9/2008            Results     POCT Protime      Component    INR    6.1    Comment:     139 819 11 exp 11/2017 ic valid                        Current Immunizations     Influenza Vaccine Quad Inj (Pf) 3/8/2017 10:03 AM, 9/1/2015      Below and/or attached are the medications your provider expects you to take. Review all of your home medications and newly ordered medications with your provider and/or pharmacist. Follow medication instructions as directed by your provider and/or pharmacist. Please keep your medication list with you and share with your provider. Update the information when medications are discontinued, doses are changed, or new medications (including over-the-counter products) are added; and carry medication information at all times in the event of emergency situations     Allergies:  No Known Allergies          Medications  Valid as of: April 06, 2017 -  9:22 AM    Generic Name Brand Name Tablet Size Instructions for use    Alendronate Sodium (Tab) FOSAMAX 70 MG Take 70 mg by mouth every Friday.        ALPRAZolam (Tab)  XANAX 0.5 MG Take 1/2  to 1 tablet by mouth twice as needed for anxiety. One month supply.        Capsaicin (Cream) ZOSTRIX 0.025 % Apply 1 Each to affected area(s) 3 times a day as needed.        Citalopram Hydrobromide (Tab) CELEXA 40 MG TAKE 1 TABLET BY MOUTH DAILY        Docusate Sodium (Tab) Docusate Sodium 100 MG Take 1 Tab by mouth 2 Times a Day.        Furosemide (Tab) LASIX 20 MG Take 20 mg by mouth 2 times a day as needed. Indications: Edema        Gabapentin (Cap) NEURONTIN 400 MG Take 1 Cap by mouth 3 times a day. as needed for nerve pain.        HydrOXYzine HCl (Tab) ATARAX 25 MG Take 1 Tab by mouth 3 times a day as needed for Anxiety.        Morphine Sulfate (Tab CR) MS CONTIN 60 MG Take 1 tablet by mouth twice per day as needed for pain.        Nicotine (PATCH 24 HR) NICOTINE STEP 1 21 MG/24HR APPLY 1 PATCH BY TRANSDERMAL ROUTE DAILY        OxyCODONE HCl (Tab) Oxycodone HCl 20 MG Take 1 Tab by mouth every 3 hours as needed for Moderate Pain or Severe Pain ((4-6)).        Potassium Chloride Emmanuelle CR (Tab CR) K-DUR 10 MEQ Take 10 mEq by mouth 2 times a day as needed (with lasix).        Prazosin HCl (Cap) MINIPRESS 2 MG Take 2 mg by mouth every evening.        Prazosin HCl (Cap) MINIPRESS 1 MG Take 1 mg by mouth every evening.        TiZANidine HCl (Tab) ZANAFLEX 4 MG Take 1/2 to 1 tablet by mouth every 6 hours as needed for muscle spasms.        TraZODone HCl (Tab) DESYREL 50 MG Take one tablet by mouth at bedtime as needed for insomnia.        Warfarin Sodium (Tab) COUMADIN 5 MG Take 1 Tab by mouth COUMADIN-DAILY.        .                 Medicines prescribed today were sent to:     [facility]/PHARMACY #[id number] - [place] - [address] [place] [address]    Phone: [phone] Fax: [phone]    Open 24 Hours?: No      Medication refill instructions:       If your prescription bottle indicates you have medication refills left, it is not necessary to call your provider’s office. Please  contact your pharmacy and they will refill your medication.    If your prescription bottle indicates you do not have any refills left, you may request refills at any time through one of the following ways: The online [employer] system (except Urgent Care), by calling your provider’s office, or by asking your pharmacy to contact your provider’s office with a refill request. Medication refills are processed only during regular business hours and may not be available until the next business day. Your provider may request additional information or to have a follow-up visit with you prior to refilling your medication.   *Please Note: Medication refills are assigned a new Rx number when refilled electronically. Your pharmacy may indicate that no refills were authorized even though a new prescription for the same medication is available at the pharmacy. Please request the medicine by name with the pharmacy before contacting your provider for a refill.        Warfarin Dosing Calendar   April 2017 Details    Sun Mon Tue Wed Thu Fri Sat           1                 2               3               4               5               6   6.1   Hold   See details      7      Hold         8      5 mg           9      5 mg         10      2.5 mg         11      5 mg         12               13               14               15                 16               17               18               19               20               21               22                 23               24               25               26               27               28               29                 30                      Date Details   04/06 This INR check   INR: 6.1   139 819 11 exp 11/2017 ic valid       Date of next INR:  4/11/2017         How to take your warfarin dose     To take:  2.5 mg Take 0.5 of a 5 mg tablet.    To take:  5 mg Take 1 of the 5 mg tablets.    Hold Do not take your warfarin dose. See the Details table to the right for additional  instructions.                   [employer] Access Code: [email or web address]  Expires: 5/6/2017  9:22 AM    [employer]ister  A secure, online tool to manage your health information     [employer]’s Football Meister® is a secure, online tool that connects you to your personalized health information from the privacy of your home -- day or night - making it very easy for you to manage your healthcare. Once the activation process is completed, you can even access your medical information using the Football Meister kailyn, which is available for free in the [employer] Kailyn store or Google Play store.     [employer] provides the following levels of access (as shown below):   My Chart Features   [facility] Primary Care Doctor [facility]  Specialists [facility]  Urgent  Care Non-[facility]  Primary Care  Doctor   Email your healthcare team securely and privately 24/7 X X X    Manage appointments: schedule your next appointment; view details of past/upcoming appointments X      Request prescription refills. X      View recent personal medical records, including lab and immunizations X X X X   View health record, including health history, allergies, medications X X X X   Read reports about your outpatient visits, procedures, consult and ER notes X X X X   See your discharge summary, which is a recap of your hospital and/or ER visit that includes your diagnosis, lab results, and care plan. X X       How to register for [employer]:  1. Go to  [email or web address].  2. Click on the Sign Up Now box, which takes you to the New Member Sign Up page. You will need to provide the following information:  a. Enter your [employer] Access Code exactly as it appears at the top of this page. (You will not need to use this code after you’ve completed the sign-up process. If you do not sign up before the expiration date, you must request a new code.)   b. Enter your date of birth.   c. Enter your home email address.   d. Click Submit, and follow the next screen’s instructions.  3. Create a [employer]ister  ID. This will be your [employer] login ID and cannot be changed, so think of one that is secure and easy to remember.  4. Create a [employer] password. You can change your password at any time.  5. Enter your Password Reset Question and Answer. This can be used at a later time if you forget your password.   6. Enter your e-mail address. This allows you to receive e-mail notifications when new information is available in [employer].  7. Click Sign Up. You can now view your health information.    For assistance activating your [employer] account, call [phone]        Quit Tobacco Information     Do you want to quit using tobacco?    Quitting tobacco decreases risks of cancer, heart and lung disease, increases life expectancy, improves sense of taste and smell, and increases spending money, among other benefits.    If you are thinking about quitting, we can help.  • [facility] Quit Tobacco Program: [phone]  o Program occurs weekly for four weeks and includes pharmacist consultation on products to support quitting smoking or chewing tobacco. A provider referral is needed for pharmacist consultation.  • Tobacco Users Help Hotline: 8-637-QUIT-NOW (763-9898) or https://[place].quitlogix.org/  o Free, confidential telephone and online coaching for [place] residents. Sessions are designed on a schedule that is convenient for you. Eligible clients receive free nicotine replacement therapy.  • Nationally: www.smokefree.gov  o Information and professional assistance to support both immediate and long-term needs as you become, and remain, a non-smoker. Smokefree.gov allows you to choose the help that best fits your needs.

## 2017-04-11 ENCOUNTER — ANTICOAGULATION VISIT (OUTPATIENT)
Dept: MEDICAL GROUP | Facility: MEDICAL CENTER | Age: 67
End: 2017-04-11
Payer: MEDICAID

## 2017-04-11 VITALS — DIASTOLIC BLOOD PRESSURE: 81 MMHG | OXYGEN SATURATION: 75 % | SYSTOLIC BLOOD PRESSURE: 120 MMHG

## 2017-04-11 DIAGNOSIS — D68.9 OTHER AND UNSPECIFIED COAGULATION DEFECTS: ICD-10-CM

## 2017-04-11 LAB — INR PPP: 1.9 (ref 2–3.5)

## 2017-04-11 PROCEDURE — 85610 PROTHROMBIN TIME: CPT | Performed by: FAMILY MEDICINE

## 2017-04-11 NOTE — MR AVS SNAPSHOT
Manisha Rincon   2017 11:15 AM   Anticoagulation Visit   MRN: [id number]    Department:  [facility]   Dept Phone:  [phone]    Description:  Female : 1950   Provider:  Janelle LOCKWOOD Filter           Allergies as of 2017     No Known Allergies      You were diagnosed with     Other and unspecified coagulation defects (CMS-HCC)   [286.9.ICD-9-CM]         Vital Signs     Smoking Status                   Current Every Day Smoker           Basic Information     Date Of Birth Sex Race Ethnicity Preferred Language    1950 Female White Non- English      Your appointments     2017 11:30 AM   Anti-Coag Routine with [facility]   [facility] ([place]    [address]  [address]  [place] [address] [phone]              Problem List              ICD-10-CM Priority Class Noted - Resolved    Chronic neck pain M54.2, G89.29   2009 - Present    Pain due to hip joint prosthesis (CMS-HCC) T84.84XA, Z96.649   2009 - Present    Hep C w/ coma, chronic (CMS-HCC) B18.2   2009 - Present    Anxiety F41.9   2009 - Present    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   2009 - Present    Gait instability R26.81   2009 - Present    Tobacco abuse Z72.0   2009 - Present    Vitamin d deficiency    2009 - Present    Hypoxemia R09.02   11/10/2015 - Present    Chronic pain G89.29   11/10/2015 - Present    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   11/10/2015 - Present    Pain medication agreement discussed Z79.899   2016 - Present    Diuretic-induced hypokalemia E87.6, T50.2X5A   2016 - Present    Burn T30.0   2016 - Present    Calcium blood increased E83.52   2016 - Present    Humpback M40.209   3/1/2016 - Present    Cervical stenosis of spine M48.02   2016 - Present    PNA (pneumonia) J18.9   2016 - Present    Hypotension I95.9   2016 - Present    Pneumonia J18.9   2016 - Present    Dehydration E86.0   8/20/2016 - Present    Macrocytic anemia D53.9   8/20/2016 - Present    Thrombocytopenia (CMS-HCC) D69.6   8/20/2016 - Present    Fall W19.XXXA High  8/20/2016 - Present    Left hip pain M25.552   3/7/2017 - Present    Other and unspecified coagulation defects (CMS-HCC) [D68.9] D68.9   3/13/2017 - Present      Health Maintenance        Date Due Completion Dates    IMM DTaP/Tdap/Td Vaccine (1 - Tdap) 8/26/1971 ---    IMM PNEUMOCOCCAL 19-64 (ADULT) MEDIUM RISK SERIES (1 of 1 - PPSV23) 8/26/1971 ---    PAP SMEAR 8/26/1973 ---    COLONOSCOPY 8/26/2002 ---    IMM ZOSTER VACCINE 8/26/2012 ---    MAMMOGRAM 10/25/2017 10/25/2016, 1/21/2015, 10/14/2013, 6/1/2011, 5/27/2009, 12/9/2008, 12/9/2008            Results     POCT Protime      Component    INR    1.9    Comment:     [id number] exp 02/59038 ic valid                        Current Immunizations     Influenza Vaccine Quad Inj (Pf) 3/8/2017 10:03 AM, 9/1/2015      Below and/or attached are the medications your provider expects you to take. Review all of your home medications and newly ordered medications with your provider and/or pharmacist. Follow medication instructions as directed by your provider and/or pharmacist. Please keep your medication list with you and share with your provider. Update the information when medications are discontinued, doses are changed, or new medications (including over-the-counter products) are added; and carry medication information at all times in the event of emergency situations     Allergies:  No Known Allergies          Medications  Valid as of: April 11, 2017 - 11:30 AM    Generic Name Brand Name Tablet Size Instructions for use    Alendronate Sodium (Tab) FOSAMAX 70 MG Take 70 mg by mouth every Friday.        ALPRAZolam (Tab) XANAX 0.5 MG Take 1/2  to 1 tablet by mouth twice as needed for anxiety. One month supply.        Capsaicin (Cream) ZOSTRIX 0.025 % Apply 1 Each to affected area(s) 3 times a day as needed.         Citalopram Hydrobromide (Tab) CELEXA 40 MG TAKE 1 TABLET BY MOUTH DAILY        Docusate Sodium (Tab) Docusate Sodium 100 MG Take 1 Tab by mouth 2 Times a Day.        Furosemide (Tab) LASIX 20 MG Take 20 mg by mouth 2 times a day as needed. Indications: Edema        Gabapentin (Cap) NEURONTIN 400 MG Take 1 Cap by mouth 3 times a day. as needed for nerve pain.        HydrOXYzine HCl (Tab) ATARAX 25 MG Take 1 Tab by mouth 3 times a day as needed for Anxiety.        Morphine Sulfate (Tab CR) MS CONTIN 60 MG Take 1 tablet by mouth twice per day as needed for pain.        Nicotine (PATCH 24 HR) NICOTINE STEP 1 21 MG/24HR APPLY 1 PATCH BY TRANSDERMAL ROUTE DAILY        OxyCODONE HCl (Tab) Oxycodone HCl 20 MG Take 1 Tab by mouth every 3 hours as needed for Moderate Pain or Severe Pain ((4-6)).        Potassium Chloride Emmanuelle CR (Tab CR) K-DUR 10 MEQ Take 10 mEq by mouth 2 times a day as needed (with lasix).        Prazosin HCl (Cap) MINIPRESS 2 MG Take 2 mg by mouth every evening.        Prazosin HCl (Cap) MINIPRESS 1 MG Take 1 mg by mouth every evening.        TiZANidine HCl (Tab) ZANAFLEX 4 MG Take 1/2 to 1 tablet by mouth every 6 hours as needed for muscle spasms.        TraZODone HCl (Tab) DESYREL 50 MG Take one tablet by mouth at bedtime as needed for insomnia.        Warfarin Sodium (Tab) COUMADIN 5 MG Take 1 Tab by mouth COUMADIN-DAILY.        .                 Medicines prescribed today were sent to:     [facility]/PHARMACY #[id number]  [place], [place] - [address] [address]    Phone: [phone] Fax: [phone]    Open 24 Hours?: No      Medication refill instructions:       If your prescription bottle indicates you have medication refills left, it is not necessary to call your provider’s office. Please contact your pharmacy and they will refill your medication.    If your prescription bottle indicates you do not have any refills left, you may request refills at any time through one of the  following ways: The online SkyRank system (except Urgent Care), by calling your provider’s office, or by asking your pharmacy to contact your provider’s office with a refill request. Medication refills are processed only during regular business hours and may not be available until the next business day. Your provider may request additional information or to have a follow-up visit with you prior to refilling your medication.   *Please Note: Medication refills are assigned a new Rx number when refilled electronically. Your pharmacy may indicate that no refills were authorized even though a new prescription for the same medication is available at the pharmacy. Please request the medicine by name with the pharmacy before contacting your provider for a refill.        Warfarin Dosing Calendar   April 2017 Details    Sun Mon Tue Wed Thu Fri Sat           1                 2               3               4               5               6               7               8                 9               10               11   1.9   5 mg   See details      12      2.5 mg         13      5 mg         14      2.5 mg         15      5 mg           16      5 mg         17      2.5 mg         18      5 mg         19               20               21               22                 23               24               25               26               27               28               29                 30                      Date Details   04/11 This INR check   INR: 1.9   [id number] exp 02/32018 ic valid       Date of next INR:  4/18/2017         How to take your warfarin dose     To take:  2.5 mg Take 0.5 of a 5 mg tablet.    To take:  5 mg Take 1 of the 5 mg tablets.              SkyRank Access Code: [email or web address]  Expires: 5/6/2017  9:22 AM    SkyRank  A secure, online tool to manage your health information     [employer] SkyRank® is a secure, online tool that connects you to your personalized health information from the  privacy of your home -- day or night - making it very easy for you to manage your healthcare. Once the activation process is completed, you can even access your medical information using the Dialective kailyn, which is available for free in the Apple Kailyn store or Google Play store.     Dialective provides the following levels of access (as shown below):   My Chart Features   [facility] Primary [facility] Doctor [facility]  Specialists [facility]  Urgent  Care Non-[facility]  Primary Care  Doctor   Email your healthcare team securely and privately 24/7 X X X    Manage appointments: schedule your next appointment; view details of past/upcoming appointments X      Request prescription refills. X      View recent personal medical records, including lab and immunizations X X X X   View health record, including health history, allergies, medications X X X X   Read reports about your outpatient visits, procedures, consult and ER notes X X X X   See your discharge summary, which is a recap of your hospital and/or ER visit that includes your diagnosis, lab results, and care plan. X X       How to register for Dialective:  1. Go to  [email or web address].  2. Click on the Sign Up Now box, which takes you to the New Member Sign Up page. You will need to provide the following information:  a. Enter your Dialective Access Code exactly as it appears at the top of this page. (You will not need to use this code after you’ve completed the sign-up process. If you do not sign up before the expiration date, you must request a new code.)   b. Enter your date of birth.   c. Enter your home email address.   d. Click Submit, and follow the next screen’s instructions.  3. Create a Dialective ID. This will be your Dialective login ID and cannot be changed, so think of one that is secure and easy to remember.  4. Create a Dialective password. You can change your password at any time.  5. Enter your Password Reset Question and Answer. This can be used at a later time if you forget  your password.   6. Enter your e-mail address. This allows you to receive e-mail notifications when new information is available in Truecaller.  7. Click Sign Up. You can now view your health information.    For assistance activating your Truecaller account, call [phone]        Quit Tobacco Information     Do you want to quit using tobacco?    Quitting tobacco decreases risks of cancer, heart and lung disease, increases life expectancy, improves sense of taste and smell, and increases spending money, among other benefits.    If you are thinking about quitting, we can help.  • [facility] Quit Tobacco Program: [phone]  o Program occurs weekly for four weeks and includes pharmacist consultation on products to support quitting smoking or chewing tobacco. A provider referral is needed for pharmacist consultation.  • Tobacco Users Help Hotline: 5-465-QUIT-NOW (903-2835) or https://[place].quitlogix.org/  o Free, confidential telephone and online coaching for [place] residents. Sessions are designed on a schedule that is convenient for you. Eligible clients receive free nicotine replacement therapy.  • Nationally: www.smokefree.gov  o Information and professional assistance to support both immediate and long-term needs as you become, and remain, a non-smoker. Smokefree.gov allows you to choose the help that best fits your needs.

## 2017-04-11 NOTE — PROGRESS NOTES
Anticoagulation Summary as of 4/11/2017     INR goal 2.0-3.0   Selected INR 1.9! (4/11/2017)   Maintenance plan 2.5 mg (5 mg x 0.5) on Mon, Wed, Fri; 5 mg (5 mg x 1) all other days   Weekly total 27.5 mg   Plan last modified Janelle Roa PHARMD (4/6/2017)   Next INR check 4/18/2017   Target end date 4/6/2017    Indications   Other and unspecified coagulation defects (CMS-HCC) [D68.9] [D68.9]         Anticoagulation Episode Summary     INR check location     Preferred lab     Send INR reminders to     Comments Orthopedic Prophylaxis      Anticoagulation Care Providers     Provider Role Specialty Phone number    [facility] Responsible  [phone]    Serafin Fitzgerald M.D. Responsible Orthopaedics [phone]        Pt is sub therapeutic today.  As she has had to hold doses last week, will continue with current dosing regimen. Pt states she is eating more. Pt denies any unusual s/s of bleeding, bruising, clotting or any changes to diet or medications.  Follow up in 1 weeks.    BROOKE BowdenD

## 2017-04-18 ENCOUNTER — ANTICOAGULATION VISIT (OUTPATIENT)
Dept: MEDICAL GROUP | Facility: MEDICAL CENTER | Age: 67
End: 2017-04-18
Payer: MEDICAID

## 2017-04-18 DIAGNOSIS — D68.9 OTHER AND UNSPECIFIED COAGULATION DEFECTS: ICD-10-CM

## 2017-04-18 LAB — INR PPP: 5.5 (ref 2–3.5)

## 2017-04-18 PROCEDURE — 85610 PROTHROMBIN TIME: CPT | Performed by: PHYSICIAN ASSISTANT

## 2017-04-18 NOTE — MR AVS SNAPSHOT
Manisha Rincon   2017 11:30 AM   Anticoagulation Visit   MRN: [id number]    Department:  [place] [address]   Dept Phone:  [phone]    Description:  Female : 1950   Provider:  Janelle LOCKWOOD Filter           Allergies as of 2017     No Known Allergies      You were diagnosed with     Other and unspecified coagulation defects (CMS-HCC)   [286.9.ICD-9-CM]         Vital Signs     Smoking Status                   Current Every Day Smoker           Basic Information     Date Of Birth Sex Race Ethnicity Preferred Language    1950 Female White Non- English      Problem List              ICD-10-CM Priority Class Noted - Resolved    Chronic neck pain M54.2, G89.29   2009 - Present    Pain due to hip joint prosthesis (CMS-HCC) T84.84XA, Z96.649   2009 - Present    Hep C w/ coma, chronic (CMS-HCC) B18.2   2009 - Present    Anxiety F41.9   2009 - Present    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   2009 - Present    Gait instability R26.81   2009 - Present    Tobacco abuse Z72.0   2009 - Present    Vitamin d deficiency    2009 - Present    Hypoxemia R09.02   11/10/2015 - Present    Chronic pain G89.29   11/10/2015 - Present    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   11/10/2015 - Present    Pain medication agreement discussed Z79.899   2016 - Present    Diuretic-induced hypokalemia E87.6, T50.2X5A   2016 - Present    Burn T30.0   2016 - Present    Calcium blood increased E83.52   2016 - Present    Humpback M40.209   3/1/2016 - Present    Cervical stenosis of spine M48.02   2016 - Present    PNA (pneumonia) J18.9   2016 - Present    Hypotension I95.9   2016 - Present    Pneumonia J18.9   2016 - Present    Dehydration E86.0   2016 - Present    Macrocytic anemia D53.9   2016 - Present    Thrombocytopenia (CMS-HCC) D69.6   2016 - Present    Fall W19.XXXA High  2016 - Present    Left hip pain M25.552   3/7/2017 - Present    Other  and unspecified coagulation defects (CMS-[facility]) [D68.9] D68.9   3/13/2017 - Present      Health Maintenance        Date Due Completion Dates    IMM DTaP/Tdap/Td Vaccine (1 - Tdap) 8/26/1971 ---    IMM PNEUMOCOCCAL 19-64 (ADULT) MEDIUM RISK SERIES (1 of 1 - PPSV23) 8/26/1971 ---    PAP SMEAR 8/26/1973 ---    COLONOSCOPY 8/26/2002 ---    IMM ZOSTER VACCINE 8/26/2012 ---    MAMMOGRAM 10/25/2017 10/25/2016, 1/21/2015, 10/14/2013, 6/1/2011, 5/27/2009, 12/9/2008, 12/9/2008            Results     POCT Protime      Component    INR    5.5    Comment:     [id number] exp 02/2018 ic valid                        Current Immunizations     Influenza Vaccine Quad Inj (Pf) 3/8/2017 10:03 AM, 9/1/2015      Below and/or attached are the medications your provider expects you to take. Review all of your home medications and newly ordered medications with your provider and/or pharmacist. Follow medication instructions as directed by your provider and/or pharmacist. Please keep your medication list with you and share with your provider. Update the information when medications are discontinued, doses are changed, or new medications (including over-the-counter products) are added; and carry medication information at all times in the event of emergency situations     Allergies:  No Known Allergies          Medications  Valid as of: April 18, 2017 - 11:37 AM    Generic Name Brand Name Tablet Size Instructions for use    Alendronate Sodium (Tab) FOSAMAX 70 MG Take 70 mg by mouth every Friday.        ALPRAZolam (Tab) XANAX 0.5 MG Take 1/2  to 1 tablet by mouth twice as needed for anxiety. One month supply.        Capsaicin (Cream) ZOSTRIX 0.025 % Apply 1 Each to affected area(s) 3 times a day as needed.        Citalopram Hydrobromide (Tab) CELEXA 40 MG TAKE 1 TABLET BY MOUTH DAILY        Docusate Sodium (Tab) Docusate Sodium 100 MG Take 1 Tab by mouth 2 Times a Day.        Furosemide (Tab) LASIX 20 MG Take 20 mg by mouth 2 times a day as needed.  Indications: Edema        Gabapentin (Cap) NEURONTIN 400 MG Take 1 Cap by mouth 3 times a day. as needed for nerve pain.        HydrOXYzine HCl (Tab) ATARAX 25 MG Take 1 Tab by mouth 3 times a day as needed for Anxiety.        Morphine Sulfate (Tab CR) MS CONTIN 60 MG Take 1 tablet by mouth twice per day as needed for pain.        Nicotine (PATCH 24 HR) NICOTINE STEP 1 21 MG/24HR APPLY 1 PATCH BY TRANSDERMAL ROUTE DAILY        OxyCODONE HCl (Tab) Oxycodone HCl 20 MG Take 1 Tab by mouth every 3 hours as needed for Moderate Pain or Severe Pain ((4-6)).        Potassium Chloride Emmanuelle CR (Tab CR) K-DUR 10 MEQ Take 10 mEq by mouth 2 times a day as needed (with lasix).        Prazosin HCl (Cap) MINIPRESS 2 MG Take 2 mg by mouth every evening.        Prazosin HCl (Cap) MINIPRESS 1 MG Take 1 mg by mouth every evening.        TiZANidine HCl (Tab) ZANAFLEX 4 MG Take 1/2 to 1 tablet by mouth every 6 hours as needed for muscle spasms.        TraZODone HCl (Tab) DESYREL 50 MG Take one tablet by mouth at bedtime as needed for insomnia.        .                 Medicines prescribed today were sent to:     [facility]/PHARMACY #[id number] - [place], [place] - [address] [place] [address]    Phone: [phone] Fax: [phone]    Open 24 Hours?: No      Medication refill instructions:       If your prescription bottle indicates you have medication refills left, it is not necessary to call your provider’s office. Please contact your pharmacy and they will refill your medication.    If your prescription bottle indicates you do not have any refills left, you may request refills at any time through one of the following ways: The online [employer] [place] system (except Urgent Care), by calling your provider’s office, or by asking your pharmacy to contact your provider’s office with a refill request. Medication refills are processed only during regular business hours and may not be available until the next business day. Your provider may request  additional information or to have a follow-up visit with you prior to refilling your medication.   *Please Note: Medication refills are assigned a new Rx number when refilled electronically. Your pharmacy may indicate that no refills were authorized even though a new prescription for the same medication is available at the pharmacy. Please request the medicine by name with the pharmacy before contacting your provider for a refill.        Warfarin Dosing Calendar   April 2017 Details    Sun Mon Tue Wed Thu Fri Sat           1                 2               3               4               5               6               7               8                 9               10               11               12               13               14               15                 16               17               18               19               20               21               22                 23               24               25               26               27               28               29                 30                      Date Details   04/06 INR: 6.1   [id number] exp 11/2017 ic valid      04/11 INR: 1.9   [id number] exp 02/32018 ic valid      04/18 This INR check      Therapy discontinued on  4/18/2017              [employer] Access Code: [email or web address]  Expires: 5/6/2017  9:22 AM    [employer]  A secure, online tool to manage your health information     [employer]’s [employer]® is a secure, online tool that connects you to your personalized health information from the privacy of your home -- day or night - making it very easy for you to manage your healthcare. Once the activation process is completed, you can even access your medical information using the [employer] kailyn, which is available for free in the Apple Kailyn store or Google Play store.     [employer] provides the following levels of access (as shown below):   My Chart Features   [facility] Primary Care Doctor [facility]  Specialists [facility]  Urgent  Care  [facility]  Primary Care  Doctor   Email your healthcare team securely and privately 24/7 X X X    Manage appointments: schedule your next appointment; view details of past/upcoming appointments X      Request prescription refills. X      View recent personal medical records, including lab and immunizations X X X X   View health record, including health history, allergies, medications X X X X   Read reports about your outpatient visits, procedures, consult and ER notes X X X X   See your discharge summary, which is a recap of your hospital and/or ER visit that includes your diagnosis, lab results, and care plan. X X       How to register for [employer]:  1. Go to  [email or web address].  2. Click on the Sign Up Now box, which takes you to the New Member Sign Up page. You will need to provide the following information:  a. Enter your [employer] Access Code exactly as it appears at the top of this page. (You will not need to use this code after you’ve completed the sign-up process. If you do not sign up before the expiration date, you must request a new code.)   b. Enter your date of birth.   c. Enter your home email address.   d. Click Submit, and follow the next screen’s instructions.  3. Create a [employer] ID. This will be your [employer] login ID and cannot be changed, so think of one that is secure and easy to remember.  4. Create a [employer] password. You can change your password at any time.  5. Enter your Password Reset Question and Answer. This can be used at a later time if you forget your password.   6. Enter your e-mail address. This allows you to receive e-mail notifications when new information is available in [employer].  7. Click Sign Up. You can now view your health information.    For assistance activating your [employer] account, call [phone]        Quit Tobacco Information     Do you want to quit using tobacco?    Quitting tobacco decreases risks of cancer, heart and lung disease, increases life  expectancy, improves sense of taste and smell, and increases spending money, among other benefits.    If you are thinking about quitting, we can help.  • [facility] Quit Tobacco Program: 468.213.6519  o Program occurs weekly for four weeks and includes pharmacist consultation on products to support quitting smoking or chewing tobacco. A provider referral is needed for pharmacist consultation.  • Tobacco Users Help Hotline: 4-800-QUIT-NOW (989-9440) or https://[place].quitlogix.org/  o Free, confidential telephone and online coaching for [place] residents. Sessions are designed on a schedule that is convenient for you. Eligible clients receive free nicotine replacement therapy.  • Nationally: www.smokefree.gov  o Information and professional assistance to support both immediate and long-term needs as you become, and remain, a non-smoker. Smokefree.gov allows you to choose the help that best fits your needs.

## 2017-04-18 NOTE — PROGRESS NOTES
Anticoagulation Summary as of 4/18/2017     INR goal 2.0-3.0   Selected INR 5.5! (4/18/2017)   Maintenance plan 2.5 mg (5 mg x 0.5) on Mon, Wed, Fri; 5 mg (5 mg x 1) all other days   Weekly total 27.5 mg   Plan last modified Janelle Roa PHARMD (4/6/2017)   Next INR check    Target end date 4/6/2017    Indications   Other and unspecified coagulation defects (CMS-HCC) [D68.9] [D68.9]         Anticoagulation Episode Summary     INR check location     Preferred lab     Discontinue date 4/18/2017    Discontinue reason Therapy Completed    Send INR reminders to     Comments Orthopedic Prophylaxis      Anticoagulation Care Providers     Provider Role Specialty Phone number    [facility] Responsible  [phone]    Serafin Fitzgerald M.D. Responsible Orthopaedics [phone]        Pt is supra therapeutic today.  Therapy is completed today.  She can stop warfarin and take 81mg asa daily.  Will dc from clinic.  Janelle Roa, BROOKED

## 2017-04-24 DIAGNOSIS — F41.9 ANXIETY: ICD-10-CM

## 2017-04-24 DIAGNOSIS — Z79.899 CONTROLLED SUBSTANCE AGREEMENT SIGNED: ICD-10-CM

## 2017-04-24 RX ORDER — HYDROXYZINE HYDROCHLORIDE 25 MG/1
TABLET, FILM COATED ORAL
Qty: 90 TAB | Refills: 5 | Status: SHIPPED | OUTPATIENT
Start: 2017-04-24 | End: 2019-04-08

## 2017-04-24 NOTE — PROGRESS NOTES
Received refill request for hydroxyzine. The patient was last seen in 1/2017, records reviewed. Reviewed intercurrent medical issues, patient underwent left total hip arthroplasty 3/7/2017, postoperative pain management per orthopedics, reviewed records, most recent xanax prescription per Dr. Fitzgerald, orthopedics.. Reviewed  profile 4/24/2017. Hydroxyzine refilled.

## 2017-07-27 DIAGNOSIS — M79.2 NERVE PAIN: ICD-10-CM

## 2017-07-27 RX ORDER — GABAPENTIN 400 MG/1
400 CAPSULE ORAL 3 TIMES DAILY
Qty: 90 CAP | Refills: 1 | Status: SHIPPED | OUTPATIENT
Start: 2017-07-27 | End: 2019-04-08

## 2017-07-27 NOTE — PROGRESS NOTES
Received refill request for gabapentin. The patient was last seen in 1/2017, records reviewed. The patient underwent left total hip arthroplasty 3/7/2017, postoperative pain management per orthopedics continuing, reviewed orthopedic records most recently from 7/2017. Gabapentin prescription refilled.

## 2018-04-24 ENCOUNTER — HOSPITAL ENCOUNTER (OUTPATIENT)
Dept: LAB | Facility: MEDICAL CENTER | Age: 68
End: 2018-04-24
Attending: FAMILY MEDICINE
Payer: MEDICARE

## 2018-04-24 LAB
ALBUMIN SERPL BCP-MCNC: 3.8 G/DL (ref 3.2–4.9)
ALBUMIN/GLOB SERPL: 1.1 G/DL
ALP SERPL-CCNC: 67 U/L (ref 30–99)
ALT SERPL-CCNC: 19 U/L (ref 2–50)
ANION GAP SERPL CALC-SCNC: 6 MMOL/L (ref 0–11.9)
APPEARANCE UR: CLEAR
APTT PPP: 31.4 SEC (ref 24.7–36)
AST SERPL-CCNC: 23 U/L (ref 12–45)
BASOPHILS # BLD AUTO: 1 % (ref 0–1.8)
BASOPHILS # BLD: 0.04 K/UL (ref 0–0.12)
BILIRUB SERPL-MCNC: 0.5 MG/DL (ref 0.1–1.5)
BILIRUB UR QL STRIP.AUTO: NEGATIVE
BLD GP AB SCN SERPL QL: NORMAL
BUN SERPL-MCNC: 20 MG/DL (ref 8–22)
CALCIUM SERPL-MCNC: 9.2 MG/DL (ref 8.5–10.5)
CHLORIDE SERPL-SCNC: 99 MMOL/L (ref 96–112)
CHOLEST SERPL-MCNC: 192 MG/DL (ref 100–199)
CO2 SERPL-SCNC: 32 MMOL/L (ref 20–33)
COLOR UR: YELLOW
CREAT SERPL-MCNC: 0.9 MG/DL (ref 0.5–1.4)
CRP SERPL HS-MCNC: 0.15 MG/DL (ref 0–0.75)
EOSINOPHIL # BLD AUTO: 0.08 K/UL (ref 0–0.51)
EOSINOPHIL NFR BLD: 2 % (ref 0–6.9)
ERYTHROCYTE [DISTWIDTH] IN BLOOD BY AUTOMATED COUNT: 42.5 FL (ref 35.9–50)
ERYTHROCYTE [SEDIMENTATION RATE] IN BLOOD BY WESTERGREN METHOD: 15 MM/HOUR (ref 0–30)
EST. AVERAGE GLUCOSE BLD GHB EST-MCNC: 117 MG/DL
GLOBULIN SER CALC-MCNC: 3.6 G/DL (ref 1.9–3.5)
GLUCOSE SERPL-MCNC: 96 MG/DL (ref 65–99)
GLUCOSE UR STRIP.AUTO-MCNC: NEGATIVE MG/DL
HBA1C MFR BLD: 5.7 % (ref 0–5.6)
HCT VFR BLD AUTO: 40 % (ref 37–47)
HDLC SERPL-MCNC: 42 MG/DL
HGB BLD-MCNC: 13.7 G/DL (ref 12–16)
IMM GRANULOCYTES # BLD AUTO: 0.01 K/UL (ref 0–0.11)
IMM GRANULOCYTES NFR BLD AUTO: 0.3 % (ref 0–0.9)
INR PPP: 1.13 (ref 0.87–1.13)
KETONES UR STRIP.AUTO-MCNC: NEGATIVE MG/DL
LDLC SERPL CALC-MCNC: 125 MG/DL
LEUKOCYTE ESTERASE UR QL STRIP.AUTO: NEGATIVE
LYMPHOCYTES # BLD AUTO: 1.66 K/UL (ref 1–4.8)
LYMPHOCYTES NFR BLD: 41.8 % (ref 22–41)
MCH RBC QN AUTO: 30.9 PG (ref 27–33)
MCHC RBC AUTO-ENTMCNC: 34.3 G/DL (ref 33.6–35)
MCV RBC AUTO: 90.1 FL (ref 81.4–97.8)
MICRO URNS: NORMAL
MONOCYTES # BLD AUTO: 0.42 K/UL (ref 0–0.85)
MONOCYTES NFR BLD AUTO: 10.6 % (ref 0–13.4)
NEUTROPHILS # BLD AUTO: 1.76 K/UL (ref 2–7.15)
NEUTROPHILS NFR BLD: 44.3 % (ref 44–72)
NITRITE UR QL STRIP.AUTO: NEGATIVE
NRBC # BLD AUTO: 0 K/UL
NRBC BLD-RTO: 0 /100 WBC
PH UR STRIP.AUTO: 7 [PH]
PLATELET # BLD AUTO: 197 K/UL (ref 164–446)
PMV BLD AUTO: 9.6 FL (ref 9–12.9)
POTASSIUM SERPL-SCNC: 3.7 MMOL/L (ref 3.6–5.5)
PROT SERPL-MCNC: 7.4 G/DL (ref 6–8.2)
PROT UR QL STRIP: NEGATIVE MG/DL
PROTHROMBIN TIME: 14.2 SEC (ref 12–14.6)
RBC # BLD AUTO: 4.44 M/UL (ref 4.2–5.4)
RBC UR QL AUTO: NEGATIVE
SODIUM SERPL-SCNC: 137 MMOL/L (ref 135–145)
SP GR UR STRIP.AUTO: 1.01
TRIGL SERPL-MCNC: 126 MG/DL (ref 0–149)
UROBILINOGEN UR STRIP.AUTO-MCNC: 0.2 MG/DL
WBC # BLD AUTO: 4 K/UL (ref 4.8–10.8)

## 2018-04-24 PROCEDURE — 86850 RBC ANTIBODY SCREEN: CPT

## 2018-04-24 PROCEDURE — 80061 LIPID PANEL: CPT

## 2018-04-24 PROCEDURE — 36415 COLL VENOUS BLD VENIPUNCTURE: CPT

## 2018-04-24 PROCEDURE — 81003 URINALYSIS AUTO W/O SCOPE: CPT

## 2018-04-24 PROCEDURE — 85652 RBC SED RATE AUTOMATED: CPT

## 2018-04-24 PROCEDURE — 85610 PROTHROMBIN TIME: CPT | Mod: GA

## 2018-04-24 PROCEDURE — 85730 THROMBOPLASTIN TIME PARTIAL: CPT | Mod: GA

## 2018-04-24 PROCEDURE — 86140 C-REACTIVE PROTEIN: CPT

## 2018-04-24 PROCEDURE — 80053 COMPREHEN METABOLIC PANEL: CPT

## 2018-04-24 PROCEDURE — 85025 COMPLETE CBC W/AUTO DIFF WBC: CPT

## 2018-04-24 PROCEDURE — 83036 HEMOGLOBIN GLYCOSYLATED A1C: CPT | Mod: GA

## 2018-06-05 ENCOUNTER — HOSPITAL ENCOUNTER (INPATIENT)
Facility: REHABILITATION | Age: 68
End: 2018-06-05
Attending: PHYSICAL MEDICINE & REHABILITATION | Admitting: PHYSICAL MEDICINE & REHABILITATION
Payer: MEDICARE

## 2018-10-31 ENCOUNTER — HOSPITAL ENCOUNTER (OUTPATIENT)
Dept: RADIOLOGY | Facility: MEDICAL CENTER | Age: 68
End: 2018-10-31
Attending: PHYSICIAN ASSISTANT
Payer: MEDICARE

## 2018-10-31 VITALS
HEART RATE: 87 BPM | BODY MASS INDEX: 19.97 KG/M2 | TEMPERATURE: 98.2 F | WEIGHT: 120 LBS | SYSTOLIC BLOOD PRESSURE: 120 MMHG | OXYGEN SATURATION: 92 % | DIASTOLIC BLOOD PRESSURE: 82 MMHG | RESPIRATION RATE: 16 BRPM

## 2018-10-31 DIAGNOSIS — M51.36 DDD (DEGENERATIVE DISC DISEASE), LUMBAR: ICD-10-CM

## 2018-10-31 DIAGNOSIS — M41.50 OTHER SECONDARY SCOLIOSIS, SITE UNSPECIFIED: ICD-10-CM

## 2018-10-31 PROCEDURE — 72148 MRI LUMBAR SPINE W/O DYE: CPT

## 2018-10-31 PROCEDURE — 700111 HCHG RX REV CODE 636 W/ 250 OVERRIDE (IP)

## 2018-10-31 ASSESSMENT — PAIN SCALES - GENERAL
PAINLEVEL_OUTOF10: 0

## 2018-11-01 NOTE — DISCHARGE INSTRUCTIONS
MRI ADULT DISCHARGE INSTRUCTIONS    You have been medicated today for your scan. Please follow the instructions below to ensure your safe recovery. If you have any questions or problems, feel free to call us at [phone] or [phone].     1.   Have someone stay with you to assist you as needed.    2.   Do not drive or operate any mechanical devices.    3.   Do not perform any activity that requires concentration. Make no major decisions over the next 24 hours.     4.   Be careful changing positions from laying down to sitting or standing, as you may become dizzy.     5.   Do not drink alcohol for 48 hours.    6.   There are no restrictions for eating your normal meals. Drink fluids.    7.   You may continue your usual medications for pain, tranquilizers, muscle relaxants or sedatives when awake.     8.   Tomorrow, you may continue your normal daily activities.     9.   Pressure dressing on 10 - 15 minutes. If swelling or bleeding occurs when removed, continue placing direct pressure on injection site for another 5 minutes, or until bleeding stops.     I have been informed of and understand the above discharge instructions.

## 2019-03-07 ENCOUNTER — HOSPITAL ENCOUNTER (OUTPATIENT)
Dept: LAB | Facility: MEDICAL CENTER | Age: 69
End: 2019-03-07
Attending: FAMILY MEDICINE
Payer: MEDICARE

## 2019-03-07 LAB
ALBUMIN SERPL BCP-MCNC: 4 G/DL (ref 3.2–4.9)
ALBUMIN/GLOB SERPL: 1.1 G/DL
ALP SERPL-CCNC: 74 U/L (ref 30–99)
ALT SERPL-CCNC: 21 U/L (ref 2–50)
ANION GAP SERPL CALC-SCNC: 7 MMOL/L (ref 0–11.9)
AST SERPL-CCNC: 24 U/L (ref 12–45)
BILIRUB SERPL-MCNC: 0.3 MG/DL (ref 0.1–1.5)
BLD GP AB SCN SERPL QL: NORMAL
BUN SERPL-MCNC: 23 MG/DL (ref 8–22)
CALCIUM SERPL-MCNC: 9.5 MG/DL (ref 8.5–10.5)
CHLORIDE SERPL-SCNC: 96 MMOL/L (ref 96–112)
CHOLEST SERPL-MCNC: 179 MG/DL (ref 100–199)
CO2 SERPL-SCNC: 34 MMOL/L (ref 20–33)
CREAT SERPL-MCNC: 0.99 MG/DL (ref 0.5–1.4)
ERYTHROCYTE [DISTWIDTH] IN BLOOD BY AUTOMATED COUNT: 46.6 FL (ref 35.9–50)
EST. AVERAGE GLUCOSE BLD GHB EST-MCNC: 117 MG/DL
FASTING STATUS PATIENT QL REPORTED: NORMAL
GLOBULIN SER CALC-MCNC: 3.5 G/DL (ref 1.9–3.5)
GLUCOSE SERPL-MCNC: 95 MG/DL (ref 65–99)
HBA1C MFR BLD: 5.7 % (ref 0–5.6)
HCT VFR BLD AUTO: 42.6 % (ref 37–47)
HDLC SERPL-MCNC: 48 MG/DL
HGB BLD-MCNC: 13.8 G/DL (ref 12–16)
INR PPP: 1.13 (ref 0.87–1.13)
LDLC SERPL CALC-MCNC: 103 MG/DL
MCH RBC QN AUTO: 28.8 PG (ref 27–33)
MCHC RBC AUTO-ENTMCNC: 32.4 G/DL (ref 33.6–35)
MCV RBC AUTO: 88.8 FL (ref 81.4–97.8)
PLATELET # BLD AUTO: 215 K/UL (ref 164–446)
PMV BLD AUTO: 10.2 FL (ref 9–12.9)
POTASSIUM SERPL-SCNC: 3.5 MMOL/L (ref 3.6–5.5)
PROT SERPL-MCNC: 7.5 G/DL (ref 6–8.2)
PROTHROMBIN TIME: 14.6 SEC (ref 12–14.6)
RBC # BLD AUTO: 4.8 M/UL (ref 4.2–5.4)
SODIUM SERPL-SCNC: 137 MMOL/L (ref 135–145)
T4 FREE SERPL-MCNC: 1.71 NG/DL (ref 0.53–1.43)
TRIGL SERPL-MCNC: 141 MG/DL (ref 0–149)
TSH SERPL DL<=0.005 MIU/L-ACNC: 4.49 UIU/ML (ref 0.38–5.33)
WBC # BLD AUTO: 4.8 K/UL (ref 4.8–10.8)

## 2019-03-07 PROCEDURE — 36415 COLL VENOUS BLD VENIPUNCTURE: CPT

## 2019-03-07 PROCEDURE — 84443 ASSAY THYROID STIM HORMONE: CPT

## 2019-03-07 PROCEDURE — 86850 RBC ANTIBODY SCREEN: CPT

## 2019-03-07 PROCEDURE — 85610 PROTHROMBIN TIME: CPT | Mod: GA

## 2019-03-07 PROCEDURE — 80061 LIPID PANEL: CPT

## 2019-03-07 PROCEDURE — 83036 HEMOGLOBIN GLYCOSYLATED A1C: CPT | Mod: GA

## 2019-03-07 PROCEDURE — 80053 COMPREHEN METABOLIC PANEL: CPT

## 2019-03-07 PROCEDURE — 85027 COMPLETE CBC AUTOMATED: CPT

## 2019-03-07 PROCEDURE — 84439 ASSAY OF FREE THYROXINE: CPT

## 2019-03-09 ENCOUNTER — HOSPITAL ENCOUNTER (OUTPATIENT)
Dept: RADIOLOGY | Facility: MEDICAL CENTER | Age: 69
End: 2019-03-09
Attending: FAMILY MEDICINE
Payer: MEDICARE

## 2019-03-09 DIAGNOSIS — R05.9 COUGH: ICD-10-CM

## 2019-03-09 PROCEDURE — 71046 X-RAY EXAM CHEST 2 VIEWS: CPT

## 2019-03-25 ENCOUNTER — HOME HEALTH ADMISSION (OUTPATIENT)
Dept: HOME HEALTH SERVICES | Facility: HOME HEALTHCARE | Age: 69
End: 2019-03-25
Payer: MEDICARE

## 2019-04-05 ENCOUNTER — HOME HEALTH ADMISSION (OUTPATIENT)
Dept: HOME HEALTH SERVICES | Facility: HOME HEALTHCARE | Age: 69
End: 2019-04-05
Payer: MEDICARE

## 2019-04-08 ENCOUNTER — HOME CARE VISIT (OUTPATIENT)
Dept: HOME HEALTH SERVICES | Facility: HOME HEALTHCARE | Age: 69
End: 2019-04-08
Payer: MEDICARE

## 2019-04-08 ENCOUNTER — TELEPHONE (OUTPATIENT)
Dept: HEALTH INFORMATION MANAGEMENT | Facility: OTHER | Age: 69
End: 2019-04-08

## 2019-04-08 VITALS
SYSTOLIC BLOOD PRESSURE: 132 MMHG | DIASTOLIC BLOOD PRESSURE: 70 MMHG | HEART RATE: 88 BPM | WEIGHT: 120 LBS | BODY MASS INDEX: 18.83 KG/M2 | RESPIRATION RATE: 18 BRPM | TEMPERATURE: 99.6 F | OXYGEN SATURATION: 99 % | HEIGHT: 67 IN

## 2019-04-08 PROCEDURE — 665001 SOC-HOME HEALTH

## 2019-04-08 PROCEDURE — G0493 RN CARE EA 15 MIN HH/HOSPICE: HCPCS

## 2019-04-08 PROCEDURE — 665999 HH PPS REVENUE DEBIT

## 2019-04-08 PROCEDURE — 665998 HH PPS REVENUE CREDIT

## 2019-04-08 RX ORDER — GABAPENTIN 300 MG/1
600 CAPSULE ORAL DAILY
COMMUNITY
Start: 2019-03-27

## 2019-04-08 RX ORDER — NALOXONE HYDROCHLORIDE 4 MG/.1ML
1 SPRAY NASAL
COMMUNITY
Start: 2019-03-27 | End: 2021-07-31

## 2019-04-08 SDOH — ECONOMIC STABILITY: HOUSING INSECURITY
HOME SAFETY: OXYGEN SAFETY RISK ASSESSMENT PERFORMED. PATIENT DOES NOT HAVE A NO SMOKING SIGN POSTED IN THE HOME., PT WAS GIVEN A NO SMOKING SIGN AND PROVIDED EDUCATION ABOUT WHY IT IS IMPORTANT TO PLACE ONE. PATIENT DOES HAVE A WORKING FIRE EXTINGUISHER PRESENT

## 2019-04-08 SDOH — ECONOMIC STABILITY: HOUSING INSECURITY
HOME SAFETY: IN THE HOME. SMOKE ALARMS ARE PRESENT AND FUNCTIONAL ON EACH LEVEL OF THE HOME. PATIENT DOES HAVE A FIRE ESCAPE PLAN DEVELOPED. PATIENT DOES NOT HAVE FLAMMABLE MATERIALS PRESENT IN THE HOME PRESENTING A FIRE HAZARD. NO EVIDENCE FOUND OF SMOKING MATER

## 2019-04-08 SDOH — ECONOMIC STABILITY: HOUSING INSECURITY: EVIDENCE OF SMOKING MATERIAL: 0

## 2019-04-08 SDOH — ECONOMIC STABILITY: HOUSING INSECURITY: HOME SAFETY: IALS PRESENT IN THE HOME.

## 2019-04-08 ASSESSMENT — PATIENT HEALTH QUESTIONNAIRE - PHQ9
CLINICAL INTERPRETATION OF PHQ2 SCORE: 3
5. POOR APPETITE OR OVEREATING: 0 - NOT AT ALL
2. FEELING DOWN, DEPRESSED, IRRITABLE, OR HOPELESS: 00
SUM OF ALL RESPONSES TO PHQ QUESTIONS 1-9: 3
1. LITTLE INTEREST OR PLEASURE IN DOING THINGS: 03

## 2019-04-08 ASSESSMENT — ENCOUNTER SYMPTOMS: DEBILITATING PAIN: 1

## 2019-04-08 ASSESSMENT — ACTIVITIES OF DAILY LIVING (ADL)
OASIS_M1830: 03
TRANSPORTATION COMMENTS: SPINAL PRECAUTIONS

## 2019-04-08 NOTE — TELEPHONE ENCOUNTER
Received referral from [facility]. Medications reviewed. Interaction noted between citalopram and trazodone for increased risk of QT prolongation. Per chart review, patient has been on this combination since 3/2017. Most recent EKG on file from 3/2/17 shows QTc interval of 470. Patient also noted to be taking mirtazapine 30 mg. Outbound call to patient to determine how often patient is taking trazodone. Also, based on reconciliation from outside sources, patient may be taking ER formulation of oxycodone. Outbound call to Manisha. Received verbal consent from patient to speak with her caregiver Antwon regarding her medications. Verified Manisha is no longer taking MS Contin and hydroxyzine. Reconciled list in EPIC. Caregiver states he was told patient would be receiving oxycodone IR 20 mg q 3 hours prn from [place] provider. [facility] pharmacy is in contact with provider in order to dispense. Discussed interaction with oxycodone and alprazolam. Caregiver states he has Narcan available and has been educated on use. [facility] Manisha takes trazodone nightly and denies any side effects. Max dose of citalopram in patients over 60 is 20 mg/day due to increased risk of QT prolongation. Antwon states EKG was recently done in [place]. Spoke with Winter at Dr. Rogers's office regarding above.     Amaya Faith, [email or web address]

## 2019-04-09 PROCEDURE — 665999 HH PPS REVENUE DEBIT

## 2019-04-09 PROCEDURE — 665998 HH PPS REVENUE CREDIT

## 2019-04-10 PROCEDURE — 665999 HH PPS REVENUE DEBIT

## 2019-04-10 PROCEDURE — 665998 HH PPS REVENUE CREDIT

## 2019-04-11 ENCOUNTER — HOME CARE VISIT (OUTPATIENT)
Dept: HOME HEALTH SERVICES | Facility: HOME HEALTHCARE | Age: 69
End: 2019-04-11
Payer: MEDICARE

## 2019-04-11 VITALS
SYSTOLIC BLOOD PRESSURE: 122 MMHG | TEMPERATURE: 97 F | OXYGEN SATURATION: 97 % | DIASTOLIC BLOOD PRESSURE: 74 MMHG | HEART RATE: 98 BPM | RESPIRATION RATE: 18 BRPM

## 2019-04-11 PROCEDURE — 665999 HH PPS REVENUE DEBIT

## 2019-04-11 PROCEDURE — 665998 HH PPS REVENUE CREDIT

## 2019-04-11 PROCEDURE — G0299 HHS/HOSPICE OF RN EA 15 MIN: HCPCS

## 2019-04-11 ASSESSMENT — ENCOUNTER SYMPTOMS
NAUSEA: DENIES
VOMITING: DENIES
MENTAL STATUS CHANGE: 0

## 2019-04-12 ENCOUNTER — HOSPITAL ENCOUNTER (EMERGENCY)
Facility: MEDICAL CENTER | Age: 69
End: 2019-04-12
Attending: EMERGENCY MEDICINE
Payer: MEDICARE

## 2019-04-12 ENCOUNTER — APPOINTMENT (OUTPATIENT)
Dept: RADIOLOGY | Facility: MEDICAL CENTER | Age: 69
End: 2019-04-12
Attending: EMERGENCY MEDICINE
Payer: MEDICARE

## 2019-04-12 ENCOUNTER — HOME CARE VISIT (OUTPATIENT)
Dept: HOME HEALTH SERVICES | Facility: HOME HEALTHCARE | Age: 69
End: 2019-04-12
Payer: MEDICARE

## 2019-04-12 VITALS
DIASTOLIC BLOOD PRESSURE: 68 MMHG | HEART RATE: 91 BPM | HEIGHT: 66 IN | WEIGHT: 125.66 LBS | SYSTOLIC BLOOD PRESSURE: 117 MMHG | BODY MASS INDEX: 20.2 KG/M2 | RESPIRATION RATE: 18 BRPM | OXYGEN SATURATION: 94 % | TEMPERATURE: 97.1 F

## 2019-04-12 VITALS
RESPIRATION RATE: 16 BRPM | SYSTOLIC BLOOD PRESSURE: 144 MMHG | OXYGEN SATURATION: 90 % | TEMPERATURE: 97.7 F | DIASTOLIC BLOOD PRESSURE: 80 MMHG | HEART RATE: 96 BPM

## 2019-04-12 DIAGNOSIS — L08.9 SKIN INFECTION: ICD-10-CM

## 2019-04-12 LAB
ALBUMIN SERPL BCP-MCNC: 3.6 G/DL (ref 3.2–4.9)
ALBUMIN/GLOB SERPL: 1 G/DL
ALP SERPL-CCNC: 142 U/L (ref 30–99)
ALT SERPL-CCNC: 7 U/L (ref 2–50)
ANION GAP SERPL CALC-SCNC: 7 MMOL/L (ref 0–11.9)
AST SERPL-CCNC: 18 U/L (ref 12–45)
BASOPHILS # BLD AUTO: 0.7 % (ref 0–1.8)
BASOPHILS # BLD: 0.03 K/UL (ref 0–0.12)
BILIRUB SERPL-MCNC: 0.4 MG/DL (ref 0.1–1.5)
BUN SERPL-MCNC: 12 MG/DL (ref 8–22)
CALCIUM SERPL-MCNC: 8.9 MG/DL (ref 8.5–10.5)
CHLORIDE SERPL-SCNC: 100 MMOL/L (ref 96–112)
CO2 SERPL-SCNC: 31 MMOL/L (ref 20–33)
CREAT SERPL-MCNC: 0.59 MG/DL (ref 0.5–1.4)
EOSINOPHIL # BLD AUTO: 0.1 K/UL (ref 0–0.51)
EOSINOPHIL NFR BLD: 2.2 % (ref 0–6.9)
ERYTHROCYTE [DISTWIDTH] IN BLOOD BY AUTOMATED COUNT: 61.1 FL (ref 35.9–50)
GLOBULIN SER CALC-MCNC: 3.6 G/DL (ref 1.9–3.5)
GLUCOSE SERPL-MCNC: 96 MG/DL (ref 65–99)
HCT VFR BLD AUTO: 31.1 % (ref 37–47)
HGB BLD-MCNC: 9.3 G/DL (ref 12–16)
IMM GRANULOCYTES # BLD AUTO: 0.02 K/UL (ref 0–0.11)
IMM GRANULOCYTES NFR BLD AUTO: 0.4 % (ref 0–0.9)
LACTATE BLD-SCNC: 1.5 MMOL/L (ref 0.5–2)
LYMPHOCYTES # BLD AUTO: 0.97 K/UL (ref 1–4.8)
LYMPHOCYTES NFR BLD: 21.7 % (ref 22–41)
MCH RBC QN AUTO: 28.4 PG (ref 27–33)
MCHC RBC AUTO-ENTMCNC: 29.9 G/DL (ref 33.6–35)
MCV RBC AUTO: 95.1 FL (ref 81.4–97.8)
MONOCYTES # BLD AUTO: 0.56 K/UL (ref 0–0.85)
MONOCYTES NFR BLD AUTO: 12.6 % (ref 0–13.4)
NEUTROPHILS # BLD AUTO: 2.78 K/UL (ref 2–7.15)
NEUTROPHILS NFR BLD: 62.4 % (ref 44–72)
NRBC # BLD AUTO: 0 K/UL
NRBC BLD-RTO: 0 /100 WBC
PLATELET # BLD AUTO: 447 K/UL (ref 164–446)
PMV BLD AUTO: 9.3 FL (ref 9–12.9)
POTASSIUM SERPL-SCNC: 3.8 MMOL/L (ref 3.6–5.5)
PROT SERPL-MCNC: 7.2 G/DL (ref 6–8.2)
RBC # BLD AUTO: 3.27 M/UL (ref 4.2–5.4)
SODIUM SERPL-SCNC: 138 MMOL/L (ref 135–145)
WBC # BLD AUTO: 4.5 K/UL (ref 4.8–10.8)

## 2019-04-12 PROCEDURE — 665998 HH PPS REVENUE CREDIT

## 2019-04-12 PROCEDURE — G0151 HHCP-SERV OF PT,EA 15 MIN: HCPCS

## 2019-04-12 PROCEDURE — 93971 EXTREMITY STUDY: CPT | Mod: LT

## 2019-04-12 PROCEDURE — 99284 EMERGENCY DEPT VISIT MOD MDM: CPT

## 2019-04-12 PROCEDURE — 665999 HH PPS REVENUE DEBIT

## 2019-04-12 PROCEDURE — 80053 COMPREHEN METABOLIC PANEL: CPT

## 2019-04-12 PROCEDURE — 83605 ASSAY OF LACTIC ACID: CPT

## 2019-04-12 PROCEDURE — 85025 COMPLETE CBC W/AUTO DIFF WBC: CPT

## 2019-04-12 RX ORDER — CEPHALEXIN 500 MG/1
500 CAPSULE ORAL 4 TIMES DAILY
Qty: 28 CAP | Refills: 0 | Status: SHIPPED | OUTPATIENT
Start: 2019-04-12 | End: 2019-04-19

## 2019-04-12 RX ORDER — NYSTATIN 100000 U/G
CREAM TOPICAL
Qty: 1 TUBE | Refills: 1 | Status: SHIPPED | OUTPATIENT
Start: 2019-04-12 | End: 2020-08-29

## 2019-04-12 SDOH — ECONOMIC STABILITY: HOUSING INSECURITY
HOME SAFETY: DURING PT EVALUATION HOUSE CAT ALMOST TRIPPED PT. EVERYTIME SOMEONE GOES TO THE FRONT DOOR THE CAT WILL RUN OUT.

## 2019-04-12 ASSESSMENT — LIFESTYLE VARIABLES: DO YOU DRINK ALCOHOL: NO

## 2019-04-12 ASSESSMENT — ACTIVITIES OF DAILY LIVING (ADL)
IADLS_COMMENTS: <!--EPICS-->SEE OT REPORT<!--EPICE-->
ADLS_COMMENTS: <!--EPICS-->SEE OT REPORT<!--EPICE-->

## 2019-04-12 ASSESSMENT — ENCOUNTER SYMPTOMS
FEVER: 0
DEBILITATING PAIN: 1
BACK PAIN: 1
MENTAL STATUS CHANGE: 0

## 2019-04-12 NOTE — ED PROVIDER NOTES
ED Provider Note    Scribed for Xavier Otto M.D. by Melinda Lamar. 4/12/2019, 3:30 PM.    Primary care provider: Mohsen Tamasaby, M.D.  Means of arrival: Private vehicle  History obtained from: Patient  History limited by: None    CHIEF COMPLAINT  Chief Complaint   Patient presents with   • Leg Swelling     left, x2d, +redness +swelling   • Post-Op Complications     spine surgery 3/21/19 @ [facility]       HPI  Manisha Rincon is a 68 y.o. female who presents to the Emergency Department with left lower extremity redness and swelling for the last two days. She states three weeks ago she had a spine surgery at [facility]. Currently she is having back pain but states it is not abnormal. She was at home when she began having pain and itchiness to her left lower extremity and has since developed a red rash. She denies fever.    REVIEW OF SYSTEMS  Review of Systems   Constitutional: Negative for fever.   Cardiovascular: Positive for leg swelling.   Musculoskeletal: Positive for back pain.   Skin: Positive for itching and rash.   All other systems reviewed and are negative.      PAST MEDICAL HISTORY   has a past medical history of Anxiety; Arthritis; Blind left eye; Blindness of one eye; Blood clotting disorder (CMS-HCC) (2009); Blood transfusion; Chronic pain; Clotting disorder (CMS-HCC); Dental disorder; Hemorrhagic disorder (CMS-HCC); HEP C; Hepatitis C; Hypertension; Pneumonia (2006); Psychiatric disorder; and Screening mammogram (12/09).    SURGICAL HISTORY   has a past surgical history that includes other; enlarge breast with implant; tonsillectomy; cervical disk and fusion anterior (N/A, 4/8/2016); cervical fusion posterior (N/A, 4/8/2016); and hip arthroplasty total (Left, 3/7/2017).    SOCIAL HISTORY  Social History   Substance Use Topics   • Smoking status: Current Every Day Smoker     Packs/day: 1.00     Years: 45.00     Types: Cigarettes     Last attempt to quit: 4/5/2009   • Smokeless tobacco: Not on file   •  "Alcohol use No      Comment:                                  pt states she smoke about 4 cigs per day      History   Drug Use No     Comment: quit 9/20/06       FAMILY HISTORY  Family History   Problem Relation Age of Onset   • Cancer Father         metastatic    • Cancer Sister         ? Cancer        CURRENT MEDICATIONS  Home Medications    **Home medications have not yet been reviewed for this encounter**         ALLERGIES  No Known Allergies    PHYSICAL EXAM  VITAL SIGNS: /71   Pulse 93   Temp 36.2 °C (97.1 °F) (Temporal)   Resp 16   Ht 1.676 m (5' 6\")   Wt 57 kg (125 lb 10.6 oz)   SpO2 94%   BMI 20.28 kg/m²     Constitutional:   No acute distress  HENT:  Moist mucous membranes  Eyes:  No conjunctivitis or icterus  Neck:  trachea is midline, no palpable thyroid  Lymphatic:  No cervical lymphadenopathy  Cardiovascular:  Regular rate and rhythm, no murmurs  Thorax & Lungs:  Normal breath sounds, no rhonchi  Abdomen:  Soft, Non-tender  Skin:. Excoriation of the left lower leg with some pretibial edema below the knee. No thigh swelling or tenderness  Back:  Non-tender, no CVA tenderness  Extremities:   no edema  Vascular:  symmetric radial pulse  Neurologic:  Normal gross motor    LABS  Labs Reviewed   CBC WITH DIFFERENTIAL - Abnormal; Notable for the following:        Result Value    WBC 4.5 (*)     RBC 3.27 (*)     Hemoglobin 9.3 (*)     Hematocrit 31.1 (*)     MCHC 29.9 (*)     RDW 61.1 (*)     Platelet Count 447 (*)     Lymphocytes 21.70 (*)     Lymphs (Absolute) 0.97 (*)     All other components within normal limits   COMP METABOLIC PANEL - Abnormal; Notable for the following:     Alkaline Phosphatase 142 (*)     Globulin 3.6 (*)     All other components within normal limits   LACTIC ACID   ESTIMATED GFR     All labs reviewed by me.    RADIOLOGY  US-EXTREMITY VENOUS LOWER UNILAT LEFT   Final Result        The radiologist's interpretation of all radiological studies have been reviewed by " me.    COURSE & MEDICAL DECISION MAKING  Nursing notes, VS, PMSFHx reviewed in chart.    3:30 PM - Patient seen and examined at bedside. Ordered CBC with differential, comp metabolic panel, lactic acid, US-extremity venous lower unilat let to evaluate her symptoms.     5:15 PM - Patient was reevaluated at bedside. Discussed lab and radiology results with the patient which were normal. I informed her that she will be discharged home with an antibacterial ointment and an antibacterial fungal cream. Patient was given infection return precautions.    Medical Decision Making:   Patient's ultrasound shows no DVT.  She does have some swelling excoriation and slight erythema I am to treat her for skin infection and possible fungal infection as well.  She is given return precautions and follow-up    The patient will return for new or worsening symptoms and is stable at the time of discharge.    DISPOSITION:  Patient will be discharged home in stable condition.    FOLLOW UP:  Mohsen Tamasaby, M.D.  [address] [id number]  [phone]             OUTPATIENT MEDICATIONS:  Discharge Medication List as of 4/12/2019  5:34 PM      START taking these medications    Details   cephALEXin (KEFLEX) 500 MG Cap Take 1 Cap by mouth 4 times a day for 7 days., Disp-28 Cap, R-0, Print Rx Paper      mupirocin (BACTROBAN) 2 % Ointment Apply to rash 3 times daily for 7 days, Disp-1 Tube, R-1, Print Rx Paper      nystatin (MYCOSTATIN) 753729 UNIT/GM Cream topical cream Apply to rash twice daily for 7 days, Disp-1 Tube, R-1, Print Rx Paper           FINAL IMPRESSION  1. Skin infection         C.     IMelinda (Alecia), am scribing for, and in the presence of, Xavier Otto M.D..    Electronically signed by: Melinda Marx), 4/12/2019    IXavier M.D. personally performed the services described in this documentation, as scribed by Melinda Lamar in my presence, and it is both accurate and  complete.    The note accurately reflects work and decisions made by me.  Xavier Otto  4/12/2019  8:48 PM

## 2019-04-12 NOTE — ED TRIAGE NOTES
Pt bib ems from home.  Chief Complaint   Patient presents with   • Leg Swelling     left, x2d, +redness +swelling   • Post-Op Complications     spine surgery 3/21/19 @ [facility]     Pt has hx of blood clots in the same leg.

## 2019-04-13 PROCEDURE — 665999 HH PPS REVENUE DEBIT

## 2019-04-13 PROCEDURE — 665998 HH PPS REVENUE CREDIT

## 2019-04-13 NOTE — ED NOTES
Discharged with Rx and inst in no acute distress.  Enforced need for follow up and proper use of Rx.  Verbalizes understanding.

## 2019-04-14 PROCEDURE — 665998 HH PPS REVENUE CREDIT

## 2019-04-14 PROCEDURE — 665999 HH PPS REVENUE DEBIT

## 2019-04-15 ENCOUNTER — HOME CARE VISIT (OUTPATIENT)
Dept: HOME HEALTH SERVICES | Facility: HOME HEALTHCARE | Age: 69
End: 2019-04-15
Payer: MEDICARE

## 2019-04-15 VITALS
OXYGEN SATURATION: 95 % | RESPIRATION RATE: 18 BRPM | TEMPERATURE: 98.9 F | DIASTOLIC BLOOD PRESSURE: 68 MMHG | HEART RATE: 98 BPM | SYSTOLIC BLOOD PRESSURE: 112 MMHG

## 2019-04-15 VITALS
SYSTOLIC BLOOD PRESSURE: 112 MMHG | DIASTOLIC BLOOD PRESSURE: 68 MMHG | RESPIRATION RATE: 18 BRPM | OXYGEN SATURATION: 95 % | TEMPERATURE: 99.8 F | HEART RATE: 98 BPM

## 2019-04-15 PROCEDURE — 665998 HH PPS REVENUE CREDIT

## 2019-04-15 PROCEDURE — 665999 HH PPS REVENUE DEBIT

## 2019-04-15 PROCEDURE — G0299 HHS/HOSPICE OF RN EA 15 MIN: HCPCS

## 2019-04-15 PROCEDURE — G0152 HHCP-SERV OF OT,EA 15 MIN: HCPCS

## 2019-04-15 ASSESSMENT — ACTIVITIES OF DAILY LIVING (ADL)
DRESSING_UB_ASSISTANCE: 0
GROOMING_ASSISTANCE: 0
GROOMING_COMMENTS: STANDING AT SINK
TELEPHONE_ASSISTANCE: 0
MEAL_PREP_ASSISTANCE: 6
DRESSING_LB_ASSISTANCE: 4
EATING_ASSISTANCE: 0
HOUSEKEEPING_ASSISTANCE: 6
LAUNDRY_ASSISTANCE: 6
TOILETING_ASSISTANCE: 0
TOILETING_EQUIPMENT_USED: ELEVATED TOILET
BATHING_ASSISTANCE: 4
ORAL_CARE_ASSISTANCE: 0
TRANSPORTATION_ASSISTANCE: 6
SHOPPING_ASSISTANCE: 6

## 2019-04-15 ASSESSMENT — ENCOUNTER SYMPTOMS
NAUSEA: DENIES
VOMITING: DENIES
DEBILITATING PAIN: 1
MENTAL STATUS CHANGE: 0
DEBILITATING PAIN: 1

## 2019-04-16 PROCEDURE — 665998 HH PPS REVENUE CREDIT

## 2019-04-16 PROCEDURE — 665999 HH PPS REVENUE DEBIT

## 2019-04-17 ENCOUNTER — HOME CARE VISIT (OUTPATIENT)
Dept: HOME HEALTH SERVICES | Facility: HOME HEALTHCARE | Age: 69
End: 2019-04-17
Payer: MEDICARE

## 2019-04-17 VITALS
SYSTOLIC BLOOD PRESSURE: 110 MMHG | HEART RATE: 84 BPM | RESPIRATION RATE: 18 BRPM | DIASTOLIC BLOOD PRESSURE: 74 MMHG | TEMPERATURE: 96.9 F | OXYGEN SATURATION: 96 %

## 2019-04-17 VITALS
SYSTOLIC BLOOD PRESSURE: 106 MMHG | OXYGEN SATURATION: 92 % | DIASTOLIC BLOOD PRESSURE: 68 MMHG | HEART RATE: 86 BPM | TEMPERATURE: 98.5 F | RESPIRATION RATE: 18 BRPM

## 2019-04-17 PROCEDURE — 665998 HH PPS REVENUE CREDIT

## 2019-04-17 PROCEDURE — G0151 HHCP-SERV OF PT,EA 15 MIN: HCPCS

## 2019-04-17 PROCEDURE — G0152 HHCP-SERV OF OT,EA 15 MIN: HCPCS

## 2019-04-17 PROCEDURE — 665999 HH PPS REVENUE DEBIT

## 2019-04-17 ASSESSMENT — ENCOUNTER SYMPTOMS
DEBILITATING PAIN: 1
DEBILITATING PAIN: 1

## 2019-04-18 ENCOUNTER — HOME CARE VISIT (OUTPATIENT)
Dept: HOME HEALTH SERVICES | Facility: HOME HEALTHCARE | Age: 69
End: 2019-04-18
Payer: MEDICARE

## 2019-04-18 VITALS
SYSTOLIC BLOOD PRESSURE: 104 MMHG | DIASTOLIC BLOOD PRESSURE: 70 MMHG | OXYGEN SATURATION: 98 % | HEART RATE: 81 BPM | RESPIRATION RATE: 18 BRPM | TEMPERATURE: 97.6 F

## 2019-04-18 PROCEDURE — 665998 HH PPS REVENUE CREDIT

## 2019-04-18 PROCEDURE — G0299 HHS/HOSPICE OF RN EA 15 MIN: HCPCS

## 2019-04-18 PROCEDURE — 665999 HH PPS REVENUE DEBIT

## 2019-04-18 ASSESSMENT — ENCOUNTER SYMPTOMS
DEBILITATING PAIN: 1
VOMITING: DENIES
NAUSEA: DENIES

## 2019-04-19 PROCEDURE — 665998 HH PPS REVENUE CREDIT

## 2019-04-19 PROCEDURE — 665999 HH PPS REVENUE DEBIT

## 2019-04-20 PROCEDURE — 665999 HH PPS REVENUE DEBIT

## 2019-04-20 PROCEDURE — 665998 HH PPS REVENUE CREDIT

## 2019-04-21 PROCEDURE — 665999 HH PPS REVENUE DEBIT

## 2019-04-21 PROCEDURE — 665998 HH PPS REVENUE CREDIT

## 2019-04-22 ENCOUNTER — HOME CARE VISIT (OUTPATIENT)
Dept: HOME HEALTH SERVICES | Facility: HOME HEALTHCARE | Age: 69
End: 2019-04-22
Payer: MEDICARE

## 2019-04-22 VITALS
RESPIRATION RATE: 16 BRPM | DIASTOLIC BLOOD PRESSURE: 62 MMHG | TEMPERATURE: 98.7 F | OXYGEN SATURATION: 94 % | HEART RATE: 91 BPM | SYSTOLIC BLOOD PRESSURE: 102 MMHG

## 2019-04-22 VITALS
DIASTOLIC BLOOD PRESSURE: 68 MMHG | SYSTOLIC BLOOD PRESSURE: 98 MMHG | TEMPERATURE: 98.7 F | HEART RATE: 94 BPM | RESPIRATION RATE: 16 BRPM | OXYGEN SATURATION: 94 %

## 2019-04-22 PROCEDURE — 665999 HH PPS REVENUE DEBIT

## 2019-04-22 PROCEDURE — 665998 HH PPS REVENUE CREDIT

## 2019-04-22 PROCEDURE — G0152 HHCP-SERV OF OT,EA 15 MIN: HCPCS

## 2019-04-22 PROCEDURE — G0299 HHS/HOSPICE OF RN EA 15 MIN: HCPCS

## 2019-04-22 ASSESSMENT — ENCOUNTER SYMPTOMS
DEBILITATING PAIN: 1
VOMITING: DENIES
NAUSEA: DENIES

## 2019-04-23 PROCEDURE — 665999 HH PPS REVENUE DEBIT

## 2019-04-23 PROCEDURE — 665998 HH PPS REVENUE CREDIT

## 2019-04-24 ENCOUNTER — HOME CARE VISIT (OUTPATIENT)
Dept: HOME HEALTH SERVICES | Facility: HOME HEALTHCARE | Age: 69
End: 2019-04-24
Payer: MEDICARE

## 2019-04-24 VITALS
OXYGEN SATURATION: 96 % | DIASTOLIC BLOOD PRESSURE: 62 MMHG | HEART RATE: 85 BPM | TEMPERATURE: 97.8 F | RESPIRATION RATE: 16 BRPM | SYSTOLIC BLOOD PRESSURE: 98 MMHG

## 2019-04-24 PROCEDURE — G0151 HHCP-SERV OF PT,EA 15 MIN: HCPCS

## 2019-04-24 PROCEDURE — 665999 HH PPS REVENUE DEBIT

## 2019-04-24 PROCEDURE — G0152 HHCP-SERV OF OT,EA 15 MIN: HCPCS

## 2019-04-24 PROCEDURE — 665998 HH PPS REVENUE CREDIT

## 2019-04-24 ASSESSMENT — ENCOUNTER SYMPTOMS: DEBILITATING PAIN: 1

## 2019-04-25 VITALS
RESPIRATION RATE: 16 BRPM | HEART RATE: 89 BPM | DIASTOLIC BLOOD PRESSURE: 64 MMHG | OXYGEN SATURATION: 98 % | TEMPERATURE: 98.3 F | SYSTOLIC BLOOD PRESSURE: 100 MMHG

## 2019-04-25 PROCEDURE — 665998 HH PPS REVENUE CREDIT

## 2019-04-25 PROCEDURE — 665999 HH PPS REVENUE DEBIT

## 2019-04-26 ENCOUNTER — HOME CARE VISIT (OUTPATIENT)
Dept: HOME HEALTH SERVICES | Facility: HOME HEALTHCARE | Age: 69
End: 2019-04-26
Payer: MEDICARE

## 2019-04-26 VITALS
HEART RATE: 86 BPM | OXYGEN SATURATION: 95 % | TEMPERATURE: 98.7 F | SYSTOLIC BLOOD PRESSURE: 118 MMHG | DIASTOLIC BLOOD PRESSURE: 66 MMHG | RESPIRATION RATE: 16 BRPM

## 2019-04-26 PROCEDURE — G0493 RN CARE EA 15 MIN HH/HOSPICE: HCPCS

## 2019-04-26 PROCEDURE — 665997 HH PPS REVENUE ADJ

## 2019-04-26 PROCEDURE — 665999 HH PPS REVENUE DEBIT

## 2019-04-26 PROCEDURE — 665998 HH PPS REVENUE CREDIT

## 2019-04-26 SDOH — ECONOMIC STABILITY: HOUSING INSECURITY: EVIDENCE OF SMOKING MATERIAL: 1

## 2019-04-26 ASSESSMENT — PATIENT HEALTH QUESTIONNAIRE - PHQ9
SUM OF ALL RESPONSES TO PHQ QUESTIONS 1-9: 3
CLINICAL INTERPRETATION OF PHQ2 SCORE: 1
5. POOR APPETITE OR OVEREATING: 1 - SEVERAL DAYS

## 2019-04-26 ASSESSMENT — ACTIVITIES OF DAILY LIVING (ADL)
OASIS_M1830: 01
HOME_HEALTH_OASIS: 01

## 2019-04-27 PROCEDURE — 665998 HH PPS REVENUE CREDIT

## 2019-04-27 PROCEDURE — 665999 HH PPS REVENUE DEBIT

## 2019-04-28 PROCEDURE — 665998 HH PPS REVENUE CREDIT

## 2019-04-28 PROCEDURE — 665999 HH PPS REVENUE DEBIT

## 2019-04-29 PROCEDURE — 665998 HH PPS REVENUE CREDIT

## 2019-04-29 PROCEDURE — 665999 HH PPS REVENUE DEBIT

## 2019-04-30 PROCEDURE — 665998 HH PPS REVENUE CREDIT

## 2019-04-30 PROCEDURE — 665999 HH PPS REVENUE DEBIT

## 2019-05-01 PROCEDURE — 665998 HH PPS REVENUE CREDIT

## 2019-05-01 PROCEDURE — 665999 HH PPS REVENUE DEBIT

## 2019-05-02 PROCEDURE — 665999 HH PPS REVENUE DEBIT

## 2019-05-02 PROCEDURE — 665998 HH PPS REVENUE CREDIT

## 2019-05-03 PROCEDURE — 665998 HH PPS REVENUE CREDIT

## 2019-05-03 PROCEDURE — 665999 HH PPS REVENUE DEBIT

## 2019-05-04 PROCEDURE — 665999 HH PPS REVENUE DEBIT

## 2019-05-04 PROCEDURE — 665998 HH PPS REVENUE CREDIT

## 2019-05-05 PROCEDURE — 665999 HH PPS REVENUE DEBIT

## 2019-05-05 PROCEDURE — 665998 HH PPS REVENUE CREDIT

## 2019-05-06 PROCEDURE — 665999 HH PPS REVENUE DEBIT

## 2019-05-06 PROCEDURE — 665998 HH PPS REVENUE CREDIT

## 2019-05-07 PROCEDURE — 665999 HH PPS REVENUE DEBIT

## 2019-05-07 PROCEDURE — 665998 HH PPS REVENUE CREDIT

## 2019-05-08 PROCEDURE — 665999 HH PPS REVENUE DEBIT

## 2019-05-08 PROCEDURE — 665998 HH PPS REVENUE CREDIT

## 2019-05-13 ENCOUNTER — APPOINTMENT (OUTPATIENT)
Dept: RADIOLOGY | Facility: MEDICAL CENTER | Age: 69
End: 2019-05-13
Attending: EMERGENCY MEDICINE
Payer: MEDICARE

## 2019-05-13 ENCOUNTER — HOSPITAL ENCOUNTER (EMERGENCY)
Facility: MEDICAL CENTER | Age: 69
End: 2019-05-13
Attending: EMERGENCY MEDICINE
Payer: MEDICARE

## 2019-05-13 VITALS
HEART RATE: 87 BPM | OXYGEN SATURATION: 97 % | DIASTOLIC BLOOD PRESSURE: 81 MMHG | WEIGHT: 120 LBS | TEMPERATURE: 97.8 F | SYSTOLIC BLOOD PRESSURE: 115 MMHG | RESPIRATION RATE: 17 BRPM | BODY MASS INDEX: 19.29 KG/M2 | HEIGHT: 66 IN
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
## 2019-05-13 DIAGNOSIS — S02.2XXA CLOSED FRACTURE OF NASAL BONE, INITIAL ENCOUNTER: ICD-10-CM

## 2019-05-13 DIAGNOSIS — S01.81XA FOREHEAD LACERATION, INITIAL ENCOUNTER: ICD-10-CM

## 2019-05-13 DIAGNOSIS — S09.90XA CLOSED HEAD INJURY, INITIAL ENCOUNTER: ICD-10-CM

## 2019-05-13 PROCEDURE — 303353 HCHG DERMABOND SKIN ADHESIVE

## 2019-05-13 PROCEDURE — 90471 IMMUNIZATION ADMIN: CPT

## 2019-05-13 PROCEDURE — 70450 CT HEAD/BRAIN W/O DYE: CPT

## 2019-05-13 PROCEDURE — 700111 HCHG RX REV CODE 636 W/ 250 OVERRIDE (IP): Performed by: EMERGENCY MEDICINE

## 2019-05-13 PROCEDURE — 99284 EMERGENCY DEPT VISIT MOD MDM: CPT

## 2019-05-13 PROCEDURE — 304217 HCHG IRRIGATION SYSTEM

## 2019-05-13 PROCEDURE — 90715 TDAP VACCINE 7 YRS/> IM: CPT | Performed by: EMERGENCY MEDICINE

## 2019-05-13 PROCEDURE — 304999 HCHG REPAIR-SIMPLE/INTERMED LEVEL 1

## 2019-05-13 PROCEDURE — 70486 CT MAXILLOFACIAL W/O DYE: CPT

## 2019-05-13 RX ADMIN — CLOSTRIDIUM TETANI TOXOID ANTIGEN (FORMALDEHYDE INACTIVATED), CORYNEBACTERIUM DIPHTHERIAE TOXOID ANTIGEN (FORMALDEHYDE INACTIVATED), BORDETELLA PERTUSSIS TOXOID ANTIGEN (GLUTARALDEHYDE INACTIVATED), BORDETELLA PERTUSSIS FILAMENTOUS HEMAGGLUTININ ANTIGEN (FORMALDEHYDE INACTIVATED), BORDETELLA PERTUSSIS PERTACTIN ANTIGEN, AND BORDETELLA PERTUSSIS FIMBRIAE 2/3 ANTIGEN 0.5 ML: 5; 2; 2.5; 5; 3; 5 INJECTION, SUSPENSION INTRAMUSCULAR at 13:15

## 2019-05-13 ASSESSMENT — LIFESTYLE VARIABLES: DO YOU DRINK ALCOHOL: NO

## 2019-05-13 NOTE — ED NOTES
"Pt bib [facility], ambulates to room with c/c abrasions to head & nose bleed after GLF in a parking lot.  \"I tripped over the curb.\"  Pt history of extensive back surgeries.  Denies taking blood thinners.  A&ox4.   at bedside.  Chart up for ERP evaluation.   "

## 2019-05-13 NOTE — ED NOTES
Discharge instructions given, pt verbalized understanding.  A&ox4.  VSS.  Left ER via wheelchair.  Friend to drive pt home.  Understands the importance of following up with MD.

## 2019-05-13 NOTE — DISCHARGE INSTRUCTIONS
Ice to your face.  Tylenol for pain.  Follow-up with the ear nose and throat doctor for further evaluation of your nose fracture.  Any persistent bleeding, change in mental status, drainage from the wound or concerns return.

## 2019-05-13 NOTE — ED PROVIDER NOTES
CHIEF COMPLAINT  Chief Complaint   Patient presents with   • T-5000 GLF     abrasion to head, nose bleed       HPI  Manisha Rincon is a 68 y.o. female who presents after a fall.  Patient tripped over a curb and landed on her face.  She is complaining of abrasion and bump to her forehead as well as pain in her nose and a nosebleed.  Patient denies any dizziness or syncope.  Patient denies any loss of consciousness.  Patient has a mild headache.  No blurry vision or nausea or vomiting.  Patient has a history of previous neck and low back surgery.  Last surgery was in her low back about 2 months ago.  She denies any pain in her neck or back from the fall.  Patient is not having any numbness tingling or weakness.  Patient is not on blood thinners.  Patient is not having any jaw pain.  She denies any chest pain or trouble breathing.  Denies any pain in any extremity.    REVIEW OF SYSTEMS  Positive for facial pain, nasal swelling, abrasion to the face, nose bleed   Negative for headache, numbness tingling or weakness, neck pain or back pain, dizziness or syncope, bleeding problems, hyperglycemia, blurry vision, chest pain, shortness of breath, pain in any extremity    PAST MEDICAL HISTORY   has a past medical history of Anxiety; Arthritis; Blind left eye; Blindness of one eye; Blood clotting disorder (CMS-[facility]) (2009); Blood transfusion; Chronic pain; Clotting disorder (CMS-[facility]); Dental disorder; Hemorrhagic disorder (CMS-[facility]); HEP C; Hepatitis C; Hypertension; Pneumonia (2006); Psychiatric disorder; and Screening mammogram (12/09).    SOCIAL HISTORY  Social History     Social History Main Topics   • Smoking status: Current Every Day Smoker     Packs/day: 1.00     Years: 45.00     Types: Cigarettes     Last attempt to quit: 4/5/2009   • Smokeless tobacco: Not on file   • Alcohol use No      Comment:                                  pt states she smoke about 4 cigs per day   • Drug use: No      Comment: quit 9/20/06  "  • Sexual activity: Not Currently     Birth control/ protection: Post-Menopausal       SURGICAL HISTORY   has a past surgical history that includes other; enlarge breast with implant; tonsillectomy; cervical disk and fusion anterior (N/A, 4/8/2016); cervical fusion posterior (N/A, 4/8/2016); and hip arthroplasty total (Left, 3/7/2017).    CURRENT MEDICATIONS  Reviewed.  See Encounter Summary.      ALLERGIES  No Known Allergies    PHYSICAL EXAM  VITAL SIGNS: /81   Pulse 87   Temp 36.6 °C (97.8 °F) (Temporal)   Resp 17   Ht 1.676 m (5' 6\")   Wt 54.4 kg (120 lb)   SpO2 97%   BMI 19.37 kg/m²   Constitutional: Alert in no apparent distress.  Ambulates into the purple pod  HENT: Normocephalic, Bilateral external ears normal. Nose normal.  Swelling to the left forehead with abrasion to the left forehead.  There is a flap about 2 cm to the left forehead, swelling to the nose, no septal hematoma, minimal bleeding from bilateral nares, no facial deformity, no malocclusion or loose teeth,   Eyes: Pupils are equal and reactive. Conjunctiva normal, extraocular movements intact  Neck: normal range of motion without pain  Thorax & Lungs: Easy unlabored respirations, equal breath sounds bilaterally  Abdomen:  No gross signs of peritonitis, no pain with movement   Skin: Visualized skin is  Dry, No erythema, abrasion to forehead  Extremities:   No edema, No asymmetry  Neurologic: Alert, Grossly non-focal.   Psychiatric: Affect and Mood normal    Radiology  CT-HEAD W/O   Final Result      Mild cerebral atrophy.   Displaced nasal bone fractures.   No intracranial mass effect or acute hemorrhage.      CT-MAXILLOFACIAL W/O PLUS RECONS   Final Result         1.  Bilateral nasal bone fracture is identified.      2.  No other facial fracture is identified.              COURSE & MEDICAL DECISION MAKING  Nursing notes and vital signs were reviewed. (See chart for details)    The patient presents to the Emergency Department with " facial trauma after a fall.  Her history does not suggest preceding dizziness or syncope prior to the fall.  Patient is not having any neck or back pain.  She does have evidence of abrasion to the forehead and trauma to the nose.  Plan is to obtain a CT of the head and face to further evaluate it.  Patient has a nonfocal neurologic exam.  Patient is not on blood thinners.  Patient has no septal hematoma.  No pain in the jaw.  No visual changes.  Tetanus will be given.    CT scans have returned and show no evidence of intracranial bleed.  Patient does have evidence of a nasal fracture.  Patient referred to ENT.    wound cleaned with saline.  Dermabond was applied to reapproximate the wound.  Patient tolerated the procedure well.  Wound care instructions were given.       The patient verbally agreed to the discharge precautions and follow-up plan which is documented in EPIC.         FINAL IMPRESSION     1. Fall, initial encounter    2. Closed head injury, initial encounter    3. Closed fracture of nasal bone, initial encounter    4. Forehead laceration, initial encounter            Electronically signed by: Millicent Goldberg, 5/13/2019 1:26 PMED Provider Note

## 2019-07-30 ENCOUNTER — HOSPITAL ENCOUNTER (OUTPATIENT)
Dept: RADIOLOGY | Facility: MEDICAL CENTER | Age: 69
End: 2019-07-30
Attending: FAMILY MEDICINE
Payer: MEDICARE

## 2019-07-30 DIAGNOSIS — Z12.39 BREAST SCREENING: ICD-10-CM

## 2019-07-30 PROCEDURE — 77063 BREAST TOMOSYNTHESIS BI: CPT

## 2019-12-20 ENCOUNTER — APPOINTMENT (OUTPATIENT)
Dept: RADIOLOGY | Facility: MEDICAL CENTER | Age: 69
End: 2019-12-20
Attending: EMERGENCY MEDICINE
Payer: MEDICARE

## 2019-12-20 ENCOUNTER — HOSPITAL ENCOUNTER (EMERGENCY)
Facility: MEDICAL CENTER | Age: 69
End: 2019-12-20
Attending: EMERGENCY MEDICINE
Payer: MEDICARE

## 2019-12-20 VITALS
BODY MASS INDEX: 21.36 KG/M2 | RESPIRATION RATE: 16 BRPM | DIASTOLIC BLOOD PRESSURE: 85 MMHG | HEIGHT: 66 IN | SYSTOLIC BLOOD PRESSURE: 121 MMHG | OXYGEN SATURATION: 95 % | TEMPERATURE: 98.2 F | HEART RATE: 95 BPM | WEIGHT: 132.94 LBS

## 2019-12-20 DIAGNOSIS — M54.31 SCIATICA OF RIGHT SIDE: ICD-10-CM

## 2019-12-20 DIAGNOSIS — M25.551 PAIN OF RIGHT HIP JOINT: ICD-10-CM

## 2019-12-20 PROCEDURE — 73552 X-RAY EXAM OF FEMUR 2/>: CPT | Mod: RT

## 2019-12-20 PROCEDURE — 72170 X-RAY EXAM OF PELVIS: CPT

## 2019-12-20 PROCEDURE — 99284 EMERGENCY DEPT VISIT MOD MDM: CPT

## 2019-12-20 RX ORDER — METHYLPREDNISOLONE 4 MG/1
TABLET ORAL
Qty: 21 TAB | Refills: 0 | Status: SHIPPED | OUTPATIENT
Start: 2019-12-20 | End: 2019-12-20 | Stop reason: SDUPTHER

## 2019-12-20 RX ORDER — METHYLPREDNISOLONE 4 MG/1
TABLET ORAL
Qty: 21 TAB | Refills: 0 | Status: SHIPPED | OUTPATIENT
Start: 2019-12-20 | End: 2021-07-31

## 2019-12-20 NOTE — ED TRIAGE NOTES
"Chief Complaint   Patient presents with   • Hip Pain     feel x 1 wk, r leg and hip pain started 2 days ago.      /82   Pulse 92   Temp 36.7 °C (98 °F) (Temporal)   Resp 16   Ht 1.676 m (5' 6\")   Wt 60.3 kg (132 lb 15 oz)   SpO2 95%   BMI 21.46 kg/m²     69 yr old female present with r hip and leg pain x 2 days, feel x 1 wk ago and pt thinks she may have pulled a muscle. Pt educated on triage process.     "

## 2019-12-20 NOTE — ED NOTES
Pt brought back from [facility] via wheelchair, able to ambulate from wheelchair to [facility] with assistance.     Pt c/o R hip/upper leg pain s/p fall 1 week ago. Pt states after incident she was able to walk but after a couple days pain began and she has been unable to bear a lot of weight onto R leg. No obvious deformity noted. No bruising noted. +2 bilateral LE edema noted. Pt states this is not unusual.     Pt has bruising noted to R eye, states she hit head when she fell. Takes two baby Asprin daily. Pt a/o x 4 speaking in full sentences.

## 2019-12-20 NOTE — ED PROVIDER NOTES
ED Provider Note    Scribed for Salo Monae M.D. by Jia Andre. 12/20/2019, 11:53 AM.    Primary care provider: Mohsen Tamasaby, M.D.  Means of arrival: Wheel Chair  History obtained from: Patient  History limited by: None    CHIEF COMPLAINT  Chief Complaint   Patient presents with   • Hip Pain     feel x 1 wk, r leg and hip pain started 2 days ago.      HPI  Manisha Rincon is a 69 y.o. female with a history of right hip replacement x2 who presents to the Emergency Department complaining of right hip pain and right leg pain secondary to a GLF 2 weeks ago. Patient states she tripped and fell forward 2 weeks ago and after wards was able to ambulate, although with pain, up until 2 days ago when she could no longer ambulate or bear weight on her right leg. Patient reports severe sharp stabbing pain into her inner thigh. She also endorses right sided rib pain, exacerbated with taking a deep breath.     REVIEW OF SYSTEMS  Pertinent positives include GLF, hip pain, leg pain.   Pertinent negatives include no loss of consciousness.      PAST MEDICAL HISTORY   has a past medical history of Anxiety, Arthritis, Blind left eye, Blindness of one eye, Blood clotting disorder (HCC) (2009), Blood transfusion, Chronic pain, Clotting disorder (HCC), Dental disorder, Hemorrhagic disorder (HCC), HEP C, Hepatitis C, Hypertension, Pneumonia (2006), Psychiatric disorder, and Screening mammogram (12/09).    SURGICAL HISTORY   has a past surgical history that includes other; enlarge breast with implant; tonsillectomy; cervical disk and fusion anterior (N/A, 4/8/2016); cervical fusion posterior (N/A, 4/8/2016); and hip arthroplasty total (Left, 3/7/2017).    SOCIAL HISTORY  Social History     Tobacco Use   • Smoking status: Current Some Day Smoker     Packs/day: 1.00     Years: 45.00     Pack years: 45.00     Types: Cigarettes     Last attempt to quit: 4/5/2009     Years since quitting: 10.7   • Smokeless tobacco: Never Used    Substance Use Topics   • Alcohol use: No     Alcohol/week: 0.0 oz     Comment:                                  pt states she smoke about 4 cigs per day   • Drug use: No     Types: IV     Comment: quit 9/20/06      Social History     Substance and Sexual Activity   Drug Use No   • Types: IV    Comment: quit 9/20/06       FAMILY HISTORY  Family History   Problem Relation Age of Onset   • Cancer Father         metastatic    • Cancer Sister         ? Cancer        CURRENT MEDICATIONS  No current facility-administered medications for this encounter.     Current Outpatient Medications:   •  mupirocin (BACTROBAN) 2 % Ointment, Apply to rash 3 times daily for 7 days (Patient not taking: Reported on 4/26/2019), Disp: 1 Tube, Rfl: 1  •  nystatin (MYCOSTATIN) 326514 UNIT/GM Cream topical cream, Apply to rash twice daily for 7 days (Patient not taking: Reported on 4/26/2019), Disp: 1 Tube, Rfl: 1  •  Calcium Carb-Cholecalciferol (CALCIUM 500 + D) 500-200 MG-UNIT Tab, Take 1 Tab by mouth every day., Disp: , Rfl:   •  Biotin 1000 MCG Tab, Take 1 mcg by mouth every day., Disp: , Rfl:   •  albuterol (VENTOLIN HFA) 108 (90 Base) MCG/ACT Aero Soln inhalation aerosol, Inhale 2 Puffs by mouth every 6 hours as needed for Shortness of Breath., Disp: , Rfl:   •  mirtazapine (REMERON) 30 MG Tab tablet, Take 30 mg by mouth every bedtime., Disp: , Rfl:   •  ARIPiprazole (ABILIFY) 10 MG Tab, Take 10 mg by mouth every day., Disp: , Rfl:   •  aspirin 81 MG EC tablet, Take 1 Tab by mouth every day., Disp: , Rfl:   •  Naloxone HCl (NARCAN) 4 MG/0.1ML Liquid, Spray 1 Spray in nose Once PRN., Disp: , Rfl:   •  gabapentin (NEURONTIN) 300 MG Cap, Take 600 mg by mouth every day., Disp: , Rfl:   •  Home Care Oxygen, Inhale 2.5 L/min by mouth as needed for Shortness of Breath (shortness of breath)., Disp: , Rfl:   •  trazodone (DESYREL) 50 MG Tab, Take one tablet by mouth at bedtime as needed for insomnia. (Patient taking differently: Take one tablet  "by mouth at bedtime as needed for insomnia.), Disp: 30 Tab, Rfl: 5  •  alprazolam (XANAX) 0.5 MG Tab, Take 1/2  to 1 tablet by mouth twice as needed for anxiety. One month supply. (Patient not taking: Reported on 4/11/2019), Disp: 50 Tab, Rfl: 0  •  oxycodone immediate release 20 MG Tab, Take 1 Tab by mouth every 3 hours as needed for Moderate Pain or Severe Pain ((4-6)). (Patient not taking: Reported on 4/11/2019), Disp: 120 Tab, Rfl: 0  •  citalopram (CELEXA) 40 MG Tab, TAKE 1 TABLET BY MOUTH DAILY, Disp: , Rfl: 0  •  tizanidine (ZANAFLEX) 4 MG Tab, Take 1/2 to 1 tablet by mouth every 6 hours as needed for muscle spasms. (Patient taking differently: Take 4 mg by mouth 2 times a day.), Disp: 120 Tab, Rfl: 5  •  prazosin (MINIPRESS) 1 MG Cap, Take 3 mg by mouth every evening., Disp: , Rfl:   •  alendronate (FOSAMAX) 70 MG Tab, Take 70 mg by mouth every Saturday., Disp: , Rfl:   •  furosemide (LASIX) 20 MG Tab, Take 20 mg by mouth 2 times a day. Hold for SBP <120, Disp: , Rfl:   •  potassium chloride SA (K-DUR) 10 MEQ Tab CR, Take 10 mEq by mouth every day., Disp: , Rfl:       ALLERGIES  No Known Allergies    PHYSICAL EXAM  VITAL SIGNS: /82   Pulse 92   Temp 36.7 °C (98 °F) (Temporal)   Resp 16   Ht 1.676 m (5' 6\")   Wt 60.3 kg (132 lb 15 oz)   SpO2 95%   BMI 21.46 kg/m²     Nursing note and vitals reviewed.  Constitutional: Well-developed and well-nourished. No distress.   HENT: Head is normocephalic and atraumatic. Oropharynx is clear and moist without exudate or erythema.   Eyes: Pupils are equal, round, and reactive to light. Conjunctiva are normal.   Cardiovascular: Normal rate and regular rhythm. No murmur heard. Normal radial pulses.  Pulmonary/Chest: Breath sounds normal. No wheezes or rales.   Abdominal: Soft and non-tender. No distention    Musculoskeletal: Tenderness of the right hip and pain with weight bearing. No shortening or rotation. Extremities exhibit normal range of motion without " edema.   Neurological: Awake, alert and oriented to person, place, and time. No focal deficits noted.  Skin: Skin is warm and dry. No rash.   Psychiatric: Normal mood and affect. Appropriate for clinical situation      DIAGNOSTIC STUDIES / PROCEDURES    RADIOLOGY  DX-PELVIS-1 OR 2 VIEWS   Final Result      No evidence of fracture or dislocation.      DX-FEMUR-2+ RIGHT   Final Result      No right hip prosthesis dislocation.   No acute fracture of the right femur identified.        The radiologist's interpretation of all radiological studies have been reviewed by me.      COURSE & MEDICAL DECISION MAKING  Nursing notes, VS, PMSFHx reviewed in chart.    11:53 AM - Patient seen and examined at bedside. I suspect lumbar radiculopathy, however given her history of prior surgery and recent fall we will obtain an x-ray. Patient is agreeable with this plan. Ordered DX-pelvis and DX-femur to evaluate her symptoms.     X-ray does not demonstrate any evidence of fracture dislocation.  Given the distribution of pain I feel the patient likely has lumbar radiculopathy contributing to her symptoms.  Patient will be treated with Medrol.    1:38 PM - Patients imaging results reviewed and indicate no right hip prosthesis dislocation and no acute fracture of the right femur identified. She will be discharged home with Medrol to help reduce inflammation. Strict ED return precautions discussed and follow-up encouraged. She verbalized her understanding and agrees with the discharge instructions.       DISPOSITION:  Patient will be discharged home in stable condition.    FOLLOW UP:  Mohsen Tamasaby, M.D.  [address] [address]  [phone]    Schedule an appointment as soon as possible for a visit       [facility], Emergency Dept  [facility] [facility] [id number] [phone]    If symptoms worsen      OUTPATIENT MEDICATIONS:  Discharge Medication List as of 12/20/2019  1:42 PM           The patient was discharged home with an information sheet and told to return immediately for any signs or symptoms listed.  The patient agreed to the discharge precautions and follow-up plan which is documented in EPIC.    FINAL IMPRESSION  1. Pain of right hip joint    2. Sciatica of right side          Jia CLEVELAND (Lanreibdede), am scribing for, and in the presence of, Salo Monae M.D..    Electronically signed by: Jia Andre (Lanreibdede), 12/20/2019    ISalo M.D. personally performed the services described in this documentation, as scribed by Jia Andre in my presence, and it is both accurate and complete. E    The note accurately reflects work and decisions made by me.  Salo Monae  12/20/2019  2:19 PM

## 2020-06-09 ENCOUNTER — HOSPITAL ENCOUNTER (OUTPATIENT)
Dept: RADIOLOGY | Facility: MEDICAL CENTER | Age: 70
End: 2020-06-09
Attending: PHYSICIAN ASSISTANT
Payer: MEDICARE

## 2020-06-09 DIAGNOSIS — Z98.1 S/P SPINAL FUSION: ICD-10-CM

## 2020-08-27 ENCOUNTER — HOSPITAL ENCOUNTER (OUTPATIENT)
Facility: MEDICAL CENTER | Age: 70
End: 2020-08-27
Attending: NURSE PRACTITIONER
Payer: MEDICARE

## 2020-08-27 ENCOUNTER — OFFICE VISIT (OUTPATIENT)
Dept: URGENT CARE | Facility: CLINIC | Age: 70
End: 2020-08-27
Payer: MEDICARE

## 2020-08-27 VITALS
OXYGEN SATURATION: 96 % | BODY MASS INDEX: 25.32 KG/M2 | WEIGHT: 129 LBS | DIASTOLIC BLOOD PRESSURE: 62 MMHG | TEMPERATURE: 96.9 F | HEART RATE: 93 BPM | HEIGHT: 60 IN | SYSTOLIC BLOOD PRESSURE: 118 MMHG | RESPIRATION RATE: 14 BRPM

## 2020-08-27 DIAGNOSIS — S61.219A LACERATION OF FINGER WITH INFECTION, INITIAL ENCOUNTER: ICD-10-CM

## 2020-08-27 DIAGNOSIS — L02.416 CELLULITIS AND ABSCESS OF LEFT LEG: ICD-10-CM

## 2020-08-27 DIAGNOSIS — L08.9 LACERATION OF FINGER WITH INFECTION, INITIAL ENCOUNTER: ICD-10-CM

## 2020-08-27 DIAGNOSIS — L03.116 CELLULITIS AND ABSCESS OF LEFT LEG: ICD-10-CM

## 2020-08-27 LAB
AMBIGUOUS DTTM AMBI4: NORMAL
SIGNIFICANT IND 70042: NORMAL
SITE SITE: NORMAL
SOURCE SOURCE: NORMAL

## 2020-08-27 PROCEDURE — 87186 SC STD MICRODIL/AGAR DIL: CPT

## 2020-08-27 PROCEDURE — 87205 SMEAR GRAM STAIN: CPT

## 2020-08-27 PROCEDURE — 87077 CULTURE AEROBIC IDENTIFY: CPT

## 2020-08-27 PROCEDURE — 99214 OFFICE O/P EST MOD 30 MIN: CPT | Performed by: NURSE PRACTITIONER

## 2020-08-27 PROCEDURE — 87070 CULTURE OTHR SPECIMN AEROBIC: CPT

## 2020-08-27 RX ORDER — SULFAMETHOXAZOLE AND TRIMETHOPRIM 800; 160 MG/1; MG/1
1 TABLET ORAL 2 TIMES DAILY
Qty: 20 TAB | Refills: 0 | Status: SHIPPED
Start: 2020-08-27 | End: 2020-08-29

## 2020-08-27 ASSESSMENT — ENCOUNTER SYMPTOMS
VOMITING: 0
WEIGHT LOSS: 0
NAUSEA: 0
ARTHRALGIAS: 0
HEADACHES: 0
DIZZINESS: 0
JOINT SWELLING: 0
COUGH: 0
CHILLS: 1
MYALGIAS: 0
TINGLING: 0
LEG PAIN: 1
ABDOMINAL PAIN: 0
WEAKNESS: 0
FATIGUE: 0
FALLS: 0
FEVER: 0

## 2020-08-27 ASSESSMENT — FIBROSIS 4 INDEX: FIB4 SCORE: 1.03

## 2020-08-27 NOTE — PROGRESS NOTES
Subjective:   Manisha Rincon is a 70 y.o. female who presents for Leg Pain (R swollen leg , moving to other leg and up R arm )       Leg Pain  This is a new problem. The current episode started in the past 7 days. The problem occurs constantly. The problem has been gradually worsening. Associated symptoms include chills and a rash. Pertinent negatives include no abdominal pain, arthralgias, coughing, fatigue, fever, headaches, joint swelling, myalgias, nausea, vomiting or weakness. The symptoms are aggravated by walking and standing. She has tried nothing for the symptoms.     Pt presents for evaluation of a new problem, reports left leg swelling x3 days, and a right swollen leg since this morning.  States she woke up 3 days ago with scratches on her left leg that she inflicted to herself accidentally during her sleep that caused her to bleed.  Over that period of time, has noticed increased redness, swelling, warmth, pain, and drainage involving her left leg  Has not tried anything for this leg symptoms.  Denies fever or myalgias.  It is also noticed that she has a healing laceration/wound on her right third finger at the MCP.  Patient states she had to cut the ring off of her finger and it had kept her skin in the process.  Reports her finger being red, swollen, and tender.    Review of Systems   Constitutional: Positive for chills. Negative for fatigue, fever, malaise/fatigue and weight loss.   Respiratory: Negative for cough.    Cardiovascular: Positive for leg swelling.   Gastrointestinal: Negative for abdominal pain, nausea and vomiting.   Musculoskeletal: Negative for arthralgias, falls, joint pain, joint swelling and myalgias.   Skin: Positive for itching and rash.   Neurological: Negative for dizziness, tingling, weakness and headaches.   All other systems reviewed and are negative.      MEDS:   Current Outpatient Medications:   •  sulfamethoxazole-trimethoprim (BACTRIM DS) 800-160 MG tablet, Take 1  Tab by mouth 2 times a day for 10 days., Disp: 20 Tab, Rfl: 0  •  methylPREDNISolone (MEDROL) 4 MG Tab, Take as per the package instructions., Disp: 21 Tab, Rfl: 0  •  Calcium Carb-Cholecalciferol (CALCIUM 500 + D) 500-200 MG-UNIT Tab, Take 1 Tab by mouth every day., Disp: , Rfl:   •  Biotin 1000 MCG Tab, Take 1 mcg by mouth every day., Disp: , Rfl:   •  albuterol (VENTOLIN HFA) 108 (90 Base) MCG/ACT Aero Soln inhalation aerosol, Inhale 2 Puffs by mouth every 6 hours as needed for Shortness of Breath., Disp: , Rfl:   •  mirtazapine (REMERON) 30 MG Tab tablet, Take 30 mg by mouth every bedtime., Disp: , Rfl:   •  ARIPiprazole (ABILIFY) 10 MG Tab, Take 10 mg by mouth every day., Disp: , Rfl:   •  aspirin 81 MG EC tablet, Take 1 Tab by mouth every day., Disp: , Rfl:   •  gabapentin (NEURONTIN) 300 MG Cap, Take 600 mg by mouth every day., Disp: , Rfl:   •  Home Care Oxygen, Inhale 2.5 L/min by mouth as needed for Shortness of Breath (shortness of breath)., Disp: , Rfl:   •  citalopram (CELEXA) 40 MG Tab, TAKE 1 TABLET BY MOUTH DAILY, Disp: , Rfl: 0  •  prazosin (MINIPRESS) 1 MG Cap, Take 3 mg by mouth every evening., Disp: , Rfl:   •  alendronate (FOSAMAX) 70 MG Tab, Take 70 mg by mouth every Saturday., Disp: , Rfl:   •  furosemide (LASIX) 20 MG Tab, Take 20 mg by mouth 2 times a day. Hold for SBP <120, Disp: , Rfl:   •  potassium chloride SA (K-DUR) 10 MEQ Tab CR, Take 10 mEq by mouth every day., Disp: , Rfl:   •  mupirocin (BACTROBAN) 2 % Ointment, Apply to rash 3 times daily for 7 days (Patient not taking: Reported on 4/26/2019), Disp: 1 Tube, Rfl: 1  •  nystatin (MYCOSTATIN) 890919 UNIT/GM Cream topical cream, Apply to rash twice daily for 7 days (Patient not taking: Reported on 4/26/2019), Disp: 1 Tube, Rfl: 1  •  Naloxone HCl (NARCAN) 4 MG/0.1ML Liquid, Spray 1 Spray in nose Once PRN., Disp: , Rfl:   •  trazodone (DESYREL) 50 MG Tab, Take one tablet by mouth at bedtime as needed for insomnia. (Patient taking  differently: Take one tablet by mouth at bedtime as needed for insomnia.), Disp: 30 Tab, Rfl: 5  •  oxycodone immediate release 20 MG Tab, Take 1 Tab by mouth every 3 hours as needed for Moderate Pain or Severe Pain ((4-6)). (Patient not taking: Reported on 4/11/2019), Disp: 120 Tab, Rfl: 0  •  tizanidine (ZANAFLEX) 4 MG Tab, Take 1/2 to 1 tablet by mouth every 6 hours as needed for muscle spasms. (Patient taking differently: Take 4 mg by mouth 2 times a day.), Disp: 120 Tab, Rfl: 5  ALLERGIES: No Known Allergies    Patient's PMH, SocHx, SurgHx, FamHx, Drug allergies and medications were reviewed.     Objective:   /62   Pulse 93   Temp 36.1 °C (96.9 °F) (Temporal)   Resp 14   Ht 1.524 m (5')   Wt 58.5 kg (129 lb)   SpO2 96%   BMI 25.19 kg/m²     Physical Exam  Vitals signs and nursing note reviewed.   Constitutional:       General: She is awake.      Appearance: Normal appearance. She is well-developed.   HENT:      Head: Normocephalic and atraumatic.      Right Ear: External ear normal.      Left Ear: External ear normal.      Nose: Nose normal.      Mouth/Throat:      Mouth: Mucous membranes are moist.      Pharynx: Oropharynx is clear.   Eyes:      Extraocular Movements: Extraocular movements intact.      Conjunctiva/sclera: Conjunctivae normal.   Neck:      Musculoskeletal: Normal range of motion and neck supple.   Cardiovascular:      Rate and Rhythm: Normal rate and regular rhythm.   Pulmonary:      Effort: Pulmonary effort is normal.      Breath sounds: Normal breath sounds.   Musculoskeletal: Normal range of motion.        Hands:    Skin:     General: Skin is warm and dry.      Findings: Abrasion, erythema and lesion present.             Comments: Left lower extremity with moderate erythema extending from the toes to distal third left calf.  Slight increase in temperature noted.  Two open lesions, approx. 1cm annular with serosanguineous mixed with purulent drainage located on the left tibia and  left lateral distal calf.  Culture obtained.   Neurological:      Mental Status: She is alert and oriented to person, place, and time.   Psychiatric:         Mood and Affect: Mood normal.         Behavior: Behavior normal.         Thought Content: Thought content normal.         Assessment/Plan:   Assessment    1. Cellulitis and abscess of left leg  - sulfamethoxazole-trimethoprim (BACTRIM DS) 800-160 MG tablet; Take 1 Tab by mouth 2 times a day for 10 days.  Dispense: 20 Tab; Refill: 0  - CBC WITH DIFFERENTIAL; Future  - Comp Metabolic Panel; Future  - CULTURE WOUND W/ GRAM STAIN; Future    2. Laceration of finger with infection, initial encounter    Begin medications as listed.  Culture obtained.  Question history of MRSA, will follow up regarding this. Ordered labs due to severity of edema and erythema involving her left lower extremity.  Supportive care options also discussed.  Differential diagnosis, natural history, and indications for immediate follow-up were discussed.     Return to urgent care clinic or PCP if current symptoms do not improve and/or worsening symptoms occur. Advised of signs and symptoms which would warrant further evaluation and /or emergent evaluation in ER.  All questions answered and the patient agrees to the plan of care.

## 2020-08-28 ENCOUNTER — HOSPITAL ENCOUNTER (EMERGENCY)
Facility: MEDICAL CENTER | Age: 70
End: 2020-08-28
Attending: EMERGENCY MEDICINE
Payer: MEDICARE

## 2020-08-28 VITALS
HEART RATE: 82 BPM | DIASTOLIC BLOOD PRESSURE: 101 MMHG | TEMPERATURE: 98.4 F | BODY MASS INDEX: 18.96 KG/M2 | OXYGEN SATURATION: 92 % | RESPIRATION RATE: 18 BRPM | SYSTOLIC BLOOD PRESSURE: 156 MMHG | WEIGHT: 118 LBS | HEIGHT: 66 IN

## 2020-08-28 LAB
GRAM STN SPEC: NORMAL
SIGNIFICANT IND 70042: NORMAL
SITE SITE: NORMAL
SOURCE SOURCE: NORMAL

## 2020-08-28 PROCEDURE — 302449 STATCHG TRIAGE ONLY (STATISTIC)

## 2020-08-28 ASSESSMENT — FIBROSIS 4 INDEX: FIB4 SCORE: 1.03

## 2020-08-28 NOTE — ED NOTES
Attempted to locate pt in lobby, pt not in lobby bathroom and senior lounge checked. Pt no where to be found.

## 2020-08-28 NOTE — ED TRIAGE NOTES
Chief Complaint   Patient presents with   • Sent from Urgent Care   • Leg Pain     Sent from urgent care for cellulitis of bilateral lower extremities.  Triage process explained to patient.  Pt instructed to inform RN if any changes or questions arise.  Pt back to waiting room.

## 2020-08-29 ENCOUNTER — OFFICE VISIT (OUTPATIENT)
Dept: URGENT CARE | Facility: CLINIC | Age: 70
End: 2020-08-29
Payer: MEDICARE

## 2020-08-29 ENCOUNTER — APPOINTMENT (OUTPATIENT)
Dept: RADIOLOGY | Facility: MEDICAL CENTER | Age: 70
End: 2020-08-29
Attending: EMERGENCY MEDICINE
Payer: MEDICARE

## 2020-08-29 ENCOUNTER — HOSPITAL ENCOUNTER (EMERGENCY)
Facility: MEDICAL CENTER | Age: 70
End: 2020-08-29
Attending: EMERGENCY MEDICINE
Payer: MEDICARE

## 2020-08-29 VITALS
HEIGHT: 66 IN | WEIGHT: 126.32 LBS | RESPIRATION RATE: 15 BRPM | SYSTOLIC BLOOD PRESSURE: 147 MMHG | OXYGEN SATURATION: 96 % | DIASTOLIC BLOOD PRESSURE: 78 MMHG | HEART RATE: 78 BPM | TEMPERATURE: 97.2 F | BODY MASS INDEX: 20.3 KG/M2

## 2020-08-29 VITALS
OXYGEN SATURATION: 93 % | BODY MASS INDEX: 18.96 KG/M2 | SYSTOLIC BLOOD PRESSURE: 118 MMHG | HEART RATE: 84 BPM | TEMPERATURE: 97.5 F | DIASTOLIC BLOOD PRESSURE: 74 MMHG | WEIGHT: 118 LBS | HEIGHT: 66 IN

## 2020-08-29 DIAGNOSIS — L03.116 CELLULITIS OF LEFT LOWER EXTREMITY: ICD-10-CM

## 2020-08-29 DIAGNOSIS — R60.0 LEG EDEMA: ICD-10-CM

## 2020-08-29 DIAGNOSIS — R60.9 PERIPHERAL EDEMA: ICD-10-CM

## 2020-08-29 LAB
ALBUMIN SERPL BCP-MCNC: 4.3 G/DL (ref 3.2–4.9)
ALBUMIN/GLOB SERPL: 1.2 G/DL
ALP SERPL-CCNC: 99 U/L (ref 30–99)
ALT SERPL-CCNC: 16 U/L (ref 2–50)
ANION GAP SERPL CALC-SCNC: 18 MMOL/L (ref 7–16)
AST SERPL-CCNC: 22 U/L (ref 12–45)
BASOPHILS # BLD AUTO: 0.6 % (ref 0–1.8)
BASOPHILS # BLD: 0.03 K/UL (ref 0–0.12)
BILIRUB SERPL-MCNC: 0.4 MG/DL (ref 0.1–1.5)
BUN SERPL-MCNC: 11 MG/DL (ref 8–22)
CALCIUM SERPL-MCNC: 9.8 MG/DL (ref 8.5–10.5)
CHLORIDE SERPL-SCNC: 101 MMOL/L (ref 96–112)
CO2 SERPL-SCNC: 17 MMOL/L (ref 20–33)
CREAT SERPL-MCNC: 0.82 MG/DL (ref 0.5–1.4)
EKG IMPRESSION: NORMAL
EOSINOPHIL # BLD AUTO: 0.08 K/UL (ref 0–0.51)
EOSINOPHIL NFR BLD: 1.7 % (ref 0–6.9)
ERYTHROCYTE [DISTWIDTH] IN BLOOD BY AUTOMATED COUNT: 47.4 FL (ref 35.9–50)
GLOBULIN SER CALC-MCNC: 3.7 G/DL (ref 1.9–3.5)
GLUCOSE SERPL-MCNC: 89 MG/DL (ref 65–99)
HCT VFR BLD AUTO: 44.8 % (ref 37–47)
HGB BLD-MCNC: 14.4 G/DL (ref 12–16)
IMM GRANULOCYTES # BLD AUTO: 0.03 K/UL (ref 0–0.11)
IMM GRANULOCYTES NFR BLD AUTO: 0.6 % (ref 0–0.9)
LACTATE BLD-SCNC: 1.7 MMOL/L (ref 0.5–2)
LYMPHOCYTES # BLD AUTO: 1.19 K/UL (ref 1–4.8)
LYMPHOCYTES NFR BLD: 25.7 % (ref 22–41)
MCH RBC QN AUTO: 29.8 PG (ref 27–33)
MCHC RBC AUTO-ENTMCNC: 32.1 G/DL (ref 33.6–35)
MCV RBC AUTO: 92.8 FL (ref 81.4–97.8)
MONOCYTES # BLD AUTO: 0.46 K/UL (ref 0–0.85)
MONOCYTES NFR BLD AUTO: 9.9 % (ref 0–13.4)
NEUTROPHILS # BLD AUTO: 2.84 K/UL (ref 2–7.15)
NEUTROPHILS NFR BLD: 61.5 % (ref 44–72)
NRBC # BLD AUTO: 0 K/UL
NRBC BLD-RTO: 0 /100 WBC
NT-PROBNP SERPL IA-MCNC: 254 PG/ML (ref 0–125)
PLATELET # BLD AUTO: 252 K/UL (ref 164–446)
PMV BLD AUTO: 9.3 FL (ref 9–12.9)
POTASSIUM SERPL-SCNC: 4.2 MMOL/L (ref 3.6–5.5)
PROT SERPL-MCNC: 8 G/DL (ref 6–8.2)
RBC # BLD AUTO: 4.83 M/UL (ref 4.2–5.4)
SODIUM SERPL-SCNC: 136 MMOL/L (ref 135–145)
TROPONIN T SERPL-MCNC: 17 NG/L (ref 6–19)
WBC # BLD AUTO: 4.6 K/UL (ref 4.8–10.8)

## 2020-08-29 PROCEDURE — 93971 EXTREMITY STUDY: CPT | Mod: LT

## 2020-08-29 PROCEDURE — 87040 BLOOD CULTURE FOR BACTERIA: CPT | Mod: 91

## 2020-08-29 PROCEDURE — 93005 ELECTROCARDIOGRAM TRACING: CPT | Performed by: EMERGENCY MEDICINE

## 2020-08-29 PROCEDURE — 83605 ASSAY OF LACTIC ACID: CPT

## 2020-08-29 PROCEDURE — 700111 HCHG RX REV CODE 636 W/ 250 OVERRIDE (IP): Performed by: EMERGENCY MEDICINE

## 2020-08-29 PROCEDURE — 83880 ASSAY OF NATRIURETIC PEPTIDE: CPT

## 2020-08-29 PROCEDURE — 80053 COMPREHEN METABOLIC PANEL: CPT

## 2020-08-29 PROCEDURE — 96365 THER/PROPH/DIAG IV INF INIT: CPT

## 2020-08-29 PROCEDURE — 36415 COLL VENOUS BLD VENIPUNCTURE: CPT

## 2020-08-29 PROCEDURE — 700105 HCHG RX REV CODE 258: Performed by: EMERGENCY MEDICINE

## 2020-08-29 PROCEDURE — 85025 COMPLETE CBC W/AUTO DIFF WBC: CPT

## 2020-08-29 PROCEDURE — 71045 X-RAY EXAM CHEST 1 VIEW: CPT

## 2020-08-29 PROCEDURE — 99214 OFFICE O/P EST MOD 30 MIN: CPT | Performed by: PHYSICIAN ASSISTANT

## 2020-08-29 PROCEDURE — 99285 EMERGENCY DEPT VISIT HI MDM: CPT

## 2020-08-29 PROCEDURE — 84484 ASSAY OF TROPONIN QUANT: CPT

## 2020-08-29 RX ORDER — AMOXICILLIN AND CLAVULANATE POTASSIUM 875; 125 MG/1; MG/1
1 TABLET, FILM COATED ORAL 2 TIMES DAILY
Qty: 20 TAB | Refills: 0 | Status: SHIPPED | OUTPATIENT
Start: 2020-08-29 | End: 2021-07-31

## 2020-08-29 RX ORDER — SULFAMETHOXAZOLE AND TRIMETHOPRIM 800; 160 MG/1; MG/1
1 TABLET ORAL 2 TIMES DAILY
Qty: 20 TAB | Refills: 0 | Status: SHIPPED | OUTPATIENT
Start: 2020-08-29 | End: 2020-09-08

## 2020-08-29 RX ADMIN — SODIUM CHLORIDE 3 G: 900 INJECTION INTRAVENOUS at 11:32

## 2020-08-29 ASSESSMENT — ENCOUNTER SYMPTOMS
FEVER: 0
ABDOMINAL PAIN: 0
DIARRHEA: 0
JOINT SWELLING: 0
SHORTNESS OF BREATH: 0
VOMITING: 0

## 2020-08-29 ASSESSMENT — FIBROSIS 4 INDEX
FIB4 SCORE: 1.03
FIB4 SCORE: 1.03

## 2020-08-29 NOTE — ED NOTES
Pt difficult to obtain PIV access. Blood culture and blood work drawn from R wrist. Ultrasound trained RN asked for assistance in obtaining PIV access for Unasyn administration. Vitals stable. Pt aware, denies questions/concerns.

## 2020-08-29 NOTE — DISCHARGE PLANNING
LANE from pt's 'caregiver' (Bean Macedo [phone]) wanting information on whether MD wanted pt admitted. He states he's been 'fooling around with her infected legs' and thought she needed to stay overnight, 'I have medical training'. He wants to know if she's 'shooting straight' with him because if she's not and doesn't want to help herself then he doesn't know what to do with her. He states she's always lying to him and he's just trying to get her help as he is at his wits end. He did mention to not 'give me the HIPAA crap' because if we do he's just gonna drop her. Apparently, her  recently .     Per Dr Hair he gave the pt the option to stay and she wanted to go home. She received IV Unasyn in ED and given two Rx for PO antibiotics and told to return to ED if legs worsen.     Pt does not have any emergency contacts on facesheet and CM called both numbers on facesheet to see if CM could speak with Bean but unable to reach pt. Will attempt again later.

## 2020-08-29 NOTE — ED NOTES
Pt given discharge instructions, including appropriate follow up information and prescription information. Pt states she is able to  prescriptions at [facility]. Pt denies questions/concerns about d/c. Pt dressed self. Taken to ER lobby by wheelchair where cab will take pt home.

## 2020-08-29 NOTE — PROGRESS NOTES
"Subjective:      Manisha Rincon is a 70 y.o. female who presents with Other (cellulites is worse, was here 2 days ago,)            Patient is a 70-year-old female with complex history presents to urgent care for recheck of her left lower extremity.  Patient was evaluated 2 days ago and started on Bactrim for suspected cellulitis.  Patient does believe that this developed after scratching herself.  She does have history of MRSA in the past.  She readily admits that she did not feel that the Bactrim is doing very much hence she has been doubling the medication and has since taken 8 doses of the Bactrim.  She reports that the swelling has slightly improved to her legs however the left leg remains painful, and draining.  She does admit that her friend noted that the redness has possibly gotten worse.  Patient was to have blood work last visit of which she is not had done.  She denies prior history of DVT.  She denies any new shortness of breath or chest pain.  Also of note she is taking Lasix.    Other  This is a new problem. The current episode started in the past 7 days. The problem occurs constantly. The problem has been gradually worsening. Pertinent negatives include no abdominal pain, chest pain, fever, joint swelling, rash or vomiting. Nothing aggravates the symptoms. Treatments tried: As above.   Prior wound culture revealed staph aureus.    Review of Systems   Constitutional: Positive for malaise/fatigue. Negative for fever.   Respiratory: Negative for shortness of breath.    Cardiovascular: Positive for leg swelling. Negative for chest pain.   Gastrointestinal: Negative for abdominal pain, diarrhea and vomiting.   Musculoskeletal: Negative for joint swelling.   Skin: Positive for itching. Negative for rash.   All other systems reviewed and are negative.         Objective:     /74   Pulse 84   Temp 36.4 °C (97.5 °F) (Temporal)   Ht 1.676 m (5' 6\")   Wt 53.5 kg (118 lb)   SpO2 93%   BMI 19.05 " kg/m²    PMH:  has a past medical history of Anxiety, Arthritis, Blind left eye, Blindness of one eye, Blood clotting disorder ([facility]) (2009), Blood transfusion, Chronic pain, Clotting disorder (HCC), Dental disorder, Hemorrhagic disorder (HCC), HEP C, Hepatitis C, Hypertension, Pneumonia (2006), Psychiatric disorder, and Screening mammogram (12/09). She also has no past medical history of Allergy or Breast cancer ([facility]).  MEDS: Reviewed .   ALLERGIES: No Known Allergies  SURGHX:   Past Surgical History:   Procedure Laterality Date   • HIP ARTHROPLASTY TOTAL Left 3/7/2017    Procedure: HIP ARTHROPLASTY TOTAL;  Surgeon: Serafin Fitzgerald M.D.;  Location: SURGERY [facility];  Service:    • CERVICAL DISK AND FUSION ANTERIOR N/A 4/8/2016    Procedure: CERVICAL DISK AND FUSION ANTERIOR C3-5  ;  Surgeon: Dieudonne Hill M.D.;  Location: SURGERY [facility];  Service:    • CERVICAL FUSION POSTERIOR N/A 4/8/2016    Procedure: CERVICAL FUSION POSTERIOR OCCIPUT TO C6;  Surgeon: Dieudonne Hill M.D.;  Location: SURGERY [facility];  Service:    • OTHER      MULTIPLE BACK, RIGHT HIP   • PB ENLARGE BREAST WITH IMPLANT     • TONSILLECTOMY       SOCHX:  reports that she has been smoking cigarettes. She has a 45.00 pack-year smoking history. She has never used smokeless tobacco. She reports that she does not drink alcohol or use drugs.  FH: Family history was reviewed, no pertinent findings to report    Physical Exam  Vitals signs reviewed.   Constitutional:       General: She is not in acute distress.     Appearance: She is well-developed.   HENT:      Head: Normocephalic and atraumatic.   Eyes:      Conjunctiva/sclera: Conjunctivae normal.      Pupils: Pupils are equal, round, and reactive to light.   Neck:      Musculoskeletal: Normal range of motion.      Trachea: No tracheal deviation.   Cardiovascular:      Rate and Rhythm: Normal rate.   Pulmonary:      Effort: Pulmonary effort is normal.   Skin:     General: Skin is  warm.      Comments: Left lower extremity diffuse excoriations with erythema and edema up to mid calf.  Notable calf tenderness with palpation.  Increased warmth.  Scattered abrasions to right lower extremity 1+ pitting edema.   Neurological:      Mental Status: She is alert and oriented to person, place, and time.      Comments: Difficulty with ambulation examined in wheelchair today.   Psychiatric:         Behavior: Behavior normal.         Thought Content: Thought content normal.         Judgment: Judgment normal.                 Assessment/Plan:        1. Cellulitis of left lower extremity  2. Leg edema    At this time patient has doubled up on Bactrim with noted history of MRSA with continued evidence of cellulitis and left lower extremity edema.  Appears that patient is failing on Bactrim at this time.  I do feel that patient needs further blood work and potential imaging of left lower extremity.  Patient is agreeable to have further evaluation in the emergency room this morning.  Of note it does appear that patient eloped yesterday prior to being seen.  Pt was stable throughout duration of the visit today .  Patient left with a friend to report to the emergency room via private vehicle.    Please note that this dictation was created using voice recognition software. I have made every reasonable attempt to correct obvious errors, but I expect that there are errors of grammar and possibly content that I did not discover before finalizing the note.

## 2020-08-29 NOTE — ED NOTES
"Pt arrives by wheelchair from triage. Pt blind in left eye, minimal vision to R eye. Pt reports L lower leg swelling, redness and scabbing x4 days. Pt seen at Urgent Care 2 days ago, given Bactrim RX which she has \"doubled daily\". Pt reports the pain has improved, but concerned that it has not gone away yet. Pt denies fever, chills, N/V. Pt had pitting edema to bilateral lower legs/feet. +pedal pulses.   "

## 2020-08-29 NOTE — ED TRIAGE NOTES
Manisha Maddox Sergey  70 y.o.  Chief Complaint   Patient presents with   • Leg Swelling     scabbing, drainage, and redness up to knee on left leg.        Patient to triage in  with above complaint. Pt report having scratched her leg in sleep 4 days ago and now swelling and redness.  Pt to  and started on Bactrim 2 days ago, but symptoms not relieving.      Vitals:    08/29/20 0845   BP: 141/96   Pulse: 87   Resp: 18   Temp: 36.2 °C (97.2 °F)   SpO2: 96%       Triage process explained to patient, apologized for wait time, and returned to lobby.  Pt informed to notify staff of any change in condition. NAD at this time.

## 2020-08-29 NOTE — ED PROVIDER NOTES
ED Provider Note    Scribed for Frederick Hair M.D. by Casey Crawford. 8/29/2020, 9:35 AM.    Primary care provider: Mohsen Tamasaby, M.D.  Means of arrival: Walk In  History obtained from: Patient  History limited by: None    CHIEF COMPLAINT  Chief Complaint   Patient presents with   • Leg Swelling     scabbing, drainage, and redness up to knee on left leg.        HPI  Manisha Rincon is a 70 y.o. female who presents to the Emergency Department for evaluation of a leg infection onset 4 days ago.  Patient believes she scratched her leg which led to an infection. Patient reports associated leg swelling and leg redness. Per nursing, patient was seen in Urgent Care 2 days ago and was given Bactrim. Patient notes she doubled her dose for about 1.5 days, which alleviated her pain, but has not completely alleviated all her symptoms. She notes that she has not gotten worse, but also has not improved. She denies fever, chills, nausea, or vomiting. Patient notes she has chronic lower extremity swelling. Patient states that she has animals she must take care of and is concerned about being hospitalized.     I verified that the patient was wearing a mask and I was wearing appropriate PPE every time I entered the room. The patient's mask was on the patient at all times during my encounter.    REVIEW OF SYSTEMS  As above otherwise all other systems are negative    PAST MEDICAL HISTORY   has a past medical history of Anxiety, Arthritis, Blind left eye, Blindness of one eye, Blood clotting disorder (HCC) (2009), Blood transfusion, Chronic pain, Clotting disorder (HCC), Dental disorder, Hemorrhagic disorder (HCC), HEP C, Hepatitis C, Hypertension, Pneumonia (2006), Psychiatric disorder, and Screening mammogram (12/09).    SURGICAL HISTORY   has a past surgical history that includes other; enlarge breast with implant; tonsillectomy; cervical disk and fusion anterior (N/A, 4/8/2016); cervical fusion posterior (N/A, 4/8/2016); and  hip arthroplasty total (Left, 3/7/2017).    SOCIAL HISTORY  Social History     Tobacco Use   • Smoking status: Current Some Day Smoker     Packs/day: 1.00     Years: 45.00     Pack years: 45.00     Types: Cigarettes     Last attempt to quit: 2009     Years since quittin.4   • Smokeless tobacco: Never Used   Substance Use Topics   • Alcohol use: No     Alcohol/week: 0.0 oz     Comment:                                  pt states she smoke about 4 cigs per day   • Drug use: No     Types: IV     Comment: quit 06      Social History     Substance and Sexual Activity   Drug Use No   • Types: IV    Comment: quit 06       FAMILY HISTORY  Family History   Problem Relation Age of Onset   • Cancer Father         metastatic    • Cancer Sister         ? Cancer        CURRENT MEDICATIONS  Home Medications     Reviewed by Christine Trejo R.N. (Registered Nurse) on 20 at 0857  Med List Status: Partial   Medication Last Dose Status   albuterol (VENTOLIN HFA) 108 (90 Base) MCG/ACT Aero Soln inhalation aerosol  Active   alendronate (FOSAMAX) 70 MG Tab  Active   ARIPiprazole (ABILIFY) 10 MG Tab  Active   aspirin 81 MG EC tablet  Active   Biotin 1000 MCG Tab  Active   Calcium Carb-Cholecalciferol (CALCIUM 500 + D) 500-200 MG-UNIT Tab  Active   citalopram (CELEXA) 40 MG Tab  Active   furosemide (LASIX) 20 MG Tab  Active   gabapentin (NEURONTIN) 300 MG Cap  Active   Home Care Oxygen  Active   methylPREDNISolone (MEDROL) 4 MG Tab  Active   mirtazapine (REMERON) 30 MG Tab tablet  Active   Naloxone HCl (NARCAN) 4 MG/0.1ML Liquid  Active   oxycodone immediate release 20 MG Tab  Active   potassium chloride SA (K-DUR) 10 MEQ Tab CR  Active   prazosin (MINIPRESS) 1 MG Cap  Active   sulfamethoxazole-trimethoprim (BACTRIM DS) 800-160 MG tablet  Active   tizanidine (ZANAFLEX) 4 MG Tab  Active   trazodone (DESYREL) 50 MG Tab  Active                ALLERGIES  No Known Allergies    PHYSICAL EXAM  VITAL SIGNS: /96   Pulse  "77   Temp 36.2 °C (97.2 °F) (Oral)   Resp 12   Ht 1.676 m (5' 6\")   Wt 57.3 kg (126 lb 5.2 oz)   SpO2 96%   BMI 20.39 kg/m²     Constitutional: Well developed, Well nourished, No acute distress, Non-toxic appearance.   HENT: Normocephalic, Atraumatic, Bilateral external ears normal, oropharynx moist, No oral exudates, Nose normal.   Eyes:conjunctiva is normal, there are no signs of exudate.   Neck: Supple, no meningeal signs.  Lymphatic: No lymphadenopathy noted.   Cardiovascular: Regular rate and rhythm without murmurs gallops or rubs.   Thorax & Lungs: No respiratory distress. Breathing comfortably. Lungs are clear to auscultation bilaterally, there are no wheezes no rales. Chest wall is nontender.  Abdomen: Soft, nontender, nondistended. Bowel sounds are present.   Skin: Warm, Dry, No erythema,   Back: No tenderness, No CVA tenderness.  Musculoskeletal: Good range of motion in all major joints. Lower left extremity calf tenderness, erythematous up to the mid left calf, broken skin with Serosanguineous discharge, and 2+ pitting edema. Right lower extremity 1+ pitting edema without tenderness. Intact distal pulses, no clubbing, no cyanosis  Neurologic: Alert & oriented x 3, Moving all extremities. No gross abnormalities.    Psychiatric: Affect normal, Judgment normal, Mood normal.     LABS  Results for orders placed or performed during the hospital encounter of 08/29/20   CBC WITH DIFFERENTIAL   Result Value Ref Range    WBC 4.6 (L) 4.8 - 10.8 K/uL    RBC 4.83 4.20 - 5.40 M/uL    Hemoglobin 14.4 12.0 - 16.0 g/dL    Hematocrit 44.8 37.0 - 47.0 %    MCV 92.8 81.4 - 97.8 fL    MCH 29.8 27.0 - 33.0 pg    MCHC 32.1 (L) 33.6 - 35.0 g/dL    RDW 47.4 35.9 - 50.0 fL    Platelet Count 252 164 - 446 K/uL    MPV 9.3 9.0 - 12.9 fL    Neutrophils-Polys 61.50 44.00 - 72.00 %    Lymphocytes 25.70 22.00 - 41.00 %    Monocytes 9.90 0.00 - 13.40 %    Eosinophils 1.70 0.00 - 6.90 %    Basophils 0.60 0.00 - 1.80 %    Immature " Granulocytes 0.60 0.00 - 0.90 %    Nucleated RBC 0.00 /100 WBC    Neutrophils (Absolute) 2.84 2.00 - 7.15 K/uL    Lymphs (Absolute) 1.19 1.00 - 4.80 K/uL    Monos (Absolute) 0.46 0.00 - 0.85 K/uL    Eos (Absolute) 0.08 0.00 - 0.51 K/uL    Baso (Absolute) 0.03 0.00 - 0.12 K/uL    Immature Granulocytes (abs) 0.03 0.00 - 0.11 K/uL    NRBC (Absolute) 0.00 K/uL   COMP METABOLIC PANEL   Result Value Ref Range    Sodium 136 135 - 145 mmol/L    Potassium 4.2 3.6 - 5.5 mmol/L    Chloride 101 96 - 112 mmol/L    Co2 17 (L) 20 - 33 mmol/L    Anion Gap 18.0 (H) 7.0 - 16.0    Glucose 89 65 - 99 mg/dL    Bun 11 8 - 22 mg/dL    Creatinine 0.82 0.50 - 1.40 mg/dL    Calcium 9.8 8.5 - 10.5 mg/dL    AST(SGOT) 22 12 - 45 U/L    ALT(SGPT) 16 2 - 50 U/L    Alkaline Phosphatase 99 30 - 99 U/L    Total Bilirubin 0.4 0.1 - 1.5 mg/dL    Albumin 4.3 3.2 - 4.9 g/dL    Total Protein 8.0 6.0 - 8.2 g/dL    Globulin 3.7 (H) 1.9 - 3.5 g/dL    A-G Ratio 1.2 g/dL   TROPONIN   Result Value Ref Range    Troponin T 17 6 - 19 ng/L   LACTIC ACID   Result Value Ref Range    Lactic Acid 1.7 0.5 - 2.0 mmol/L   proBrain Natriuretic Peptide, NT   Result Value Ref Range    NT-proBNP 254 (H) 0 - 125 pg/mL   ESTIMATED GFR   Result Value Ref Range    GFR If African American >60 >60 mL/min/1.73 m 2    GFR If Non African American >60 >60 mL/min/1.73 m 2   EKG   Result Value Ref Range    Report       [facility] Emergency Dept.    Test Date:  2020  Pt Name:    AYALA CALI                Department: ER  MRN:        [id number]                      Room:        20  Gender:     Female                       Technician: [id number]  :        [id number]                   Requested By:SIA MORGAN  Order #:    [id number]                    Reading MD: SIA MORGAN MD    Measurements  Intervals                                Axis  Rate:       70                           P:          79  AZ:         129                          QRS:         64  QRSD:       98                           T:          52  QT:         414  QTc:        447    Interpretive Statements  Sinus rhythm  Probable left atrial enlargement  Low voltage, extremity leads  Compared to ECG 03/02/2017 15:55:25  Low QRS voltage now present  T-wave abnormality no longer present  no acute changes  Electronically Signed On 8- 12:20:58 PDT by SIA MORGAN MD         All labs reviewed by me.    EKG  Interpreted by me      RADIOLOGY  US-EXTREMITY VENOUS LOWER UNILAT LEFT   Final Result      DX-CHEST-PORTABLE (1 VIEW)   Final Result      Significant improvement now with clear lungs and pleural spaces. Lung volumes are again large           US FINDINGS:   Left lower extremity -   Calf veins are not well seen.    Complete color filling and compressibility with normal venous flow dynamics    including spontaneous flow and respiratory phasicity.    No evidence of deep venous thrombosis in areas visualized.       Contralateral flow was obtained in the right common femoral vein and    appears to be normal.        The radiologist's interpretation of all radiological studies have been reviewed by me.    COURSE & MEDICAL DECISION MAKING  Pertinent Labs & Imaging studies reviewed. (See chart for details)    9:35 AM - Patient seen and examined at bedside. Patient will be treated with Unasyn 3g in  mL. Ordered US-Extremity Venous lower, DX-chest, EKG, Lactic acid once and every 2 hours, CBC w diff, Troponin, BNP (for leg swelling to evaluate for heart), CMP, and blood culture x2 to evaluate her symptoms. The differential diagnoses include but are not limited to: DVT, Cellulitis, renal failure causing leg swelling, heart failure causing swelling in legs    10:45 AM - Ordered BNP to evaluate the heart due to concerns of leg swelling.    12:14 PM - Patient was reevaluated at bedside. Discussed lab and radiology results with the patient and informed them that there is an infection in the leg. I  discussed the options for discharge with the patient which include antibiotic treatment. I informed her that she must wear a leg compression sleeve. The patient will return for new or worsening symptoms and is stable at the time of discharge. Patient verbalizes understanding and agreement to this plan of care.       Decision Making:  Patient presents for evaluation.  Wound culture was done at the urgent care which shows most likely Staphylococcus species but no specifics at this point is very possible MRSA the patient has been on Bactrim but is not really improved but not worsening so I do feel that a broad-spectrum with Augmentin would be worthwhile as an outpatient.  I did give the patient a dose of Unasyn here in the emergency department while I waited for the possibility of a DVT to be evaluated with the ultrasound.  There is no signs of DVT.  The patient is to follow-up with her primary care physician 1 week return if any symptoms worsen.    DISPOSITION:  Patient will be discharged home in stable condition.    FOLLOW UP:  Mohsen Tamasaby, M.D.  [address] [address]  [phone]    Schedule an appointment as soon as possible for a visit in 1 week  For re-check    [address] [id number] [phone]        [address] [address]  [phone]          OUTPATIENT MEDICATIONS:  Discharge Medication List as of 8/29/2020 12:33 PM      START taking these medications    Details   amoxicillin-clavulanate (AUGMENTIN) 875-125 MG Tab Take 1 Tab by mouth 2 times a day., Disp-20 Tab,R-0, Normal                FINAL IMPRESSION  1. Peripheral edema    2. Cellulitis of left lower extremity          Casey CLEVELAND), am scribing for, and in the presence of, Frederick Hair M.D..    Electronically signed by: Casey Marx), 8/29/2020    Frederick CLEVELAND M.D. personally performed the services  described in this documentation, as scribed by Casey Crawford in my presence, and it is both accurate and complete. C.    The note accurately reflects work and decisions made by me.  Frederick Hair M.D.  8/29/2020  2:57 PM

## 2020-08-30 LAB
BACTERIA WND AEROBE CULT: ABNORMAL
GRAM STN SPEC: ABNORMAL
SIGNIFICANT IND 70042: ABNORMAL
SITE SITE: ABNORMAL
SOURCE SOURCE: ABNORMAL

## 2020-09-03 LAB
BACTERIA BLD CULT: NORMAL
BACTERIA BLD CULT: NORMAL
SIGNIFICANT IND 70042: NORMAL
SIGNIFICANT IND 70042: NORMAL
SITE SITE: NORMAL
SITE SITE: NORMAL
SOURCE SOURCE: NORMAL
SOURCE SOURCE: NORMAL

## 2020-09-07 ENCOUNTER — TELEPHONE (OUTPATIENT)
Dept: URGENT CARE | Facility: CLINIC | Age: 70
End: 2020-09-07

## 2020-09-07 NOTE — TELEPHONE ENCOUNTER
I called the patient and advised her of +organism growth in her culture.  Advised to continue prescribed antibiotics in completion.  If her symptoms continue and/or worsen, then follow up with PCP or urgent care for further evaluation and care.  All questions were answered and she agrees to the plan of care.

## 2020-12-07 ENCOUNTER — HOSPITAL ENCOUNTER (OUTPATIENT)
Facility: MEDICAL CENTER | Age: 70
End: 2020-12-07
Attending: NURSE PRACTITIONER
Payer: MEDICARE

## 2020-12-07 PROCEDURE — U0003 INFECTIOUS AGENT DETECTION BY NUCLEIC ACID (DNA OR RNA); SEVERE ACUTE RESPIRATORY SYNDROME CORONAVIRUS 2 (SARS-COV-2) (CORONAVIRUS DISEASE [COVID-19]), AMPLIFIED PROBE TECHNIQUE, MAKING USE OF HIGH THROUGHPUT TECHNOLOGIES AS DESCRIBED BY CMS-2020-01-R: HCPCS

## 2020-12-07 PROCEDURE — C9803 HOPD COVID-19 SPEC COLLECT: HCPCS

## 2020-12-08 LAB
COVID ORDER STATUS COVID19: NORMAL
SARS-COV-2 RNA RESP QL NAA+PROBE: NOTDETECTED
SPECIMEN SOURCE: NORMAL

## 2021-03-03 ENCOUNTER — HOSPITAL ENCOUNTER (OUTPATIENT)
Facility: MEDICAL CENTER | Age: 71
End: 2021-03-03
Attending: PHYSICIAN ASSISTANT
Payer: MEDICARE

## 2021-03-03 PROCEDURE — U0005 INFEC AGEN DETEC AMPLI PROBE: HCPCS

## 2021-03-03 PROCEDURE — U0003 INFECTIOUS AGENT DETECTION BY NUCLEIC ACID (DNA OR RNA); SEVERE ACUTE RESPIRATORY SYNDROME CORONAVIRUS 2 (SARS-COV-2) (CORONAVIRUS DISEASE [COVID-19]), AMPLIFIED PROBE TECHNIQUE, MAKING USE OF HIGH THROUGHPUT TECHNOLOGIES AS DESCRIBED BY CMS-2020-01-R: HCPCS

## 2021-07-27 NOTE — CARE PLAN
"Problem: Knowledge Deficit  Goal: Knowledge of the prescribed therapeutic regimen will improve  Outcome: PROGRESSING SLOWER THAN EXPECTED  POC discussed with pt. Pt verbalized understanding, all questions answered at this time. Pt prefers not to get OOB stating, \"I'm in too much pain.\"    Problem: Pain Management  Goal: Pain level will decrease to patient’s comfort goal  Outcome: PROGRESSING SLOWER THAN EXPECTED  Pt medicated per MAR, reports marginal pain relief (9/10) after medication, but pt is able to sleep.        " 87

## 2021-07-31 ENCOUNTER — APPOINTMENT (OUTPATIENT)
Dept: RADIOLOGY | Facility: IMAGING CENTER | Age: 71
End: 2021-07-31
Attending: FAMILY MEDICINE
Payer: MEDICARE

## 2021-07-31 ENCOUNTER — OFFICE VISIT (OUTPATIENT)
Dept: URGENT CARE | Facility: CLINIC | Age: 71
End: 2021-07-31
Payer: MEDICARE

## 2021-07-31 VITALS
TEMPERATURE: 98.4 F | OXYGEN SATURATION: 93 % | WEIGHT: 182.2 LBS | RESPIRATION RATE: 24 BRPM | SYSTOLIC BLOOD PRESSURE: 116 MMHG | HEART RATE: 93 BPM | BODY MASS INDEX: 29.28 KG/M2 | HEIGHT: 66 IN | DIASTOLIC BLOOD PRESSURE: 70 MMHG

## 2021-07-31 DIAGNOSIS — S63.502A SPRAIN OF LEFT WRIST, INITIAL ENCOUNTER: ICD-10-CM

## 2021-07-31 DIAGNOSIS — S52.602A CLOSED FRACTURE OF DISTAL END OF LEFT ULNA, UNSPECIFIED FRACTURE MORPHOLOGY, INITIAL ENCOUNTER: ICD-10-CM

## 2021-07-31 DIAGNOSIS — S52.502A CLOSED FRACTURE OF DISTAL END OF LEFT RADIUS, UNSPECIFIED FRACTURE MORPHOLOGY, INITIAL ENCOUNTER: ICD-10-CM

## 2021-07-31 PROCEDURE — 99214 OFFICE O/P EST MOD 30 MIN: CPT | Mod: 25 | Performed by: FAMILY MEDICINE

## 2021-07-31 PROCEDURE — 73110 X-RAY EXAM OF WRIST: CPT | Mod: TC,LT | Performed by: FAMILY MEDICINE

## 2021-07-31 PROCEDURE — 29125 APPL SHORT ARM SPLINT STATIC: CPT | Performed by: FAMILY MEDICINE

## 2021-07-31 RX ORDER — ALPRAZOLAM 0.5 MG/1
0.5 TABLET ORAL 3 TIMES DAILY PRN
COMMUNITY

## 2021-07-31 ASSESSMENT — ENCOUNTER SYMPTOMS
FEVER: 0
COUGH: 0
SORE THROAT: 0
SHORTNESS OF BREATH: 0
VOMITING: 0
CHILLS: 0
NUMBNESS: 0
TINGLING: 0
NAUSEA: 0

## 2021-07-31 ASSESSMENT — FIBROSIS 4 INDEX: FIB4 SCORE: 1.48

## 2021-08-01 RX ORDER — ACETAMINOPHEN 500 MG
500 TABLET ORAL EVERY 6 HOURS PRN
Qty: 30 TABLET | Refills: 0 | Status: SHIPPED | OUTPATIENT
Start: 2021-08-01

## 2021-08-01 NOTE — PROGRESS NOTES
Subjective:   Manisha Rincon is a 70 y.o. female who presents for Wrist Injury (pt has injury on (L) wrist x 1 hr ago)        Wrist Injury   The incident occurred less than 1 hour ago. The incident occurred in the street. The injury mechanism was a fall (Fall onto outstretched left hand). The quality of the pain is described as aching. The pain is moderate. The pain has been constant since the incident. Pertinent negatives include no numbness or tingling. The symptoms are aggravated by movement and palpation. She has tried rest for the symptoms. The treatment provided no relief.     PMH:  has a past medical history of Anxiety, Arthritis, Blind left eye, Blindness of one eye, Blood clotting disorder (HCC) (2009), Blood transfusion, Chronic pain, Clotting disorder (HCC), Dental disorder, Hemorrhagic disorder (HCC), HEP C, Hepatitis C, Hypertension, Pneumonia (2006), Psychiatric disorder, and Screening mammogram (12/09). She also has no past medical history of Allergy or Breast cancer (Former[facility]).  MEDS:   Current Outpatient Medications:   •  ALPRAZolam (XANAX) 0.5 MG Tab, Take 0.5 mg by mouth 3 times a day as needed for Sleep., Disp: , Rfl:   •  Calcium Carb-Cholecalciferol (CALCIUM 500 + D) 500-200 MG-UNIT Tab, Take 1 Tab by mouth every day., Disp: , Rfl:   •  Biotin 1000 MCG Tab, Take 1 mcg by mouth every day., Disp: , Rfl:   •  albuterol (VENTOLIN HFA) 108 (90 Base) MCG/ACT Aero Soln inhalation aerosol, Inhale 2 Puffs by mouth every 6 hours as needed for Shortness of Breath., Disp: , Rfl:   •  mirtazapine (REMERON) 30 MG Tab tablet, Take 30 mg by mouth every bedtime., Disp: , Rfl:   •  aspirin 81 MG EC tablet, Take 1 Tab by mouth every day., Disp: , Rfl:   •  gabapentin (NEURONTIN) 300 MG Cap, Take 600 mg by mouth every day., Disp: , Rfl:   •  Home Care Oxygen, Inhale 2.5 L/min by mouth as needed for Shortness of Breath (shortness of breath)., Disp: , Rfl:   •  citalopram (CELEXA) 40 MG Tab, TAKE 1 TABLET BY MOUTH  "DAILY, Disp: , Rfl: 0  •  prazosin (MINIPRESS) 1 MG Cap, Take 3 mg by mouth every evening., Disp: , Rfl:   •  furosemide (LASIX) 20 MG Tab, Take 20 mg by mouth 2 times a day. Hold for SBP <120, Disp: , Rfl:   •  potassium chloride SA (K-DUR) 10 MEQ Tab CR, Take 10 mEq by mouth every day., Disp: , Rfl:   ALLERGIES: No Known Allergies  SURGHX:   Past Surgical History:   Procedure Laterality Date   • HIP ARTHROPLASTY TOTAL Left 3/7/2017    Procedure: HIP ARTHROPLASTY TOTAL;  Surgeon: Serafin Fitzgerald M.D.;  Location: SURGERY [facility];  Service:    • CERVICAL DISK AND FUSION ANTERIOR N/A 4/8/2016    Procedure: CERVICAL DISK AND FUSION ANTERIOR C3-5  ;  Surgeon: Dieudonne Hill M.D.;  Location: SURGERY [facility];  Service:    • CERVICAL FUSION POSTERIOR N/A 4/8/2016    Procedure: CERVICAL FUSION POSTERIOR OCCIPUT TO C6;  Surgeon: Dieudonne Hill M.D.;  Location: SURGERY [facility];  Service:    • OTHER      MULTIPLE BACK, RIGHT HIP   • MS BREAST AUGMENTATION WITH IMPLANT     • TONSILLECTOMY       SOCHX:  reports that she has been smoking cigarettes. She has a 45.00 pack-year smoking history. She has never used smokeless tobacco. She reports current drug use. Drugs: IV and Marijuana. She reports that she does not drink alcohol.  FH:   Family History   Problem Relation Age of Onset   • Cancer Father         metastatic    • Cancer Sister         ? Cancer      Review of Systems   Constitutional: Negative for chills and fever.   HENT: Negative for sore throat.    Respiratory: Negative for cough and shortness of breath.    Gastrointestinal: Negative for nausea and vomiting.   Skin: Negative for rash.   Neurological: Negative for tingling and numbness.        Objective:   /70 (BP Location: Right arm, Patient Position: Sitting, BP Cuff Size: Adult)   Pulse 93   Temp 36.9 °C (98.4 °F) (Temporal)   Resp (!) 24   Ht 1.676 m (5' 6\")   Wt 82.6 kg (182 lb 3.2 oz)   SpO2 93%   BMI 29.41 kg/m²   Physical " Exam  Vitals and nursing note reviewed.   Constitutional:       General: She is not in acute distress.     Appearance: She is well-developed.   HENT:      Head: Normocephalic and atraumatic.      Right Ear: External ear normal.      Left Ear: External ear normal.      Nose: Nose normal.      Mouth/Throat:      Mouth: Mucous membranes are moist.   Eyes:      Conjunctiva/sclera: Conjunctivae normal.   Cardiovascular:      Rate and Rhythm: Normal rate.   Pulmonary:      Effort: Pulmonary effort is normal. No respiratory distress.      Breath sounds: Normal breath sounds.   Abdominal:      General: There is no distension.   Musculoskeletal:      Left wrist: Swelling, deformity, tenderness and bony tenderness present. Decreased range of motion. Normal pulse.   Skin:     General: Skin is warm and dry.   Neurological:      General: No focal deficit present.      Mental Status: She is alert and oriented to person, place, and time. Mental status is at baseline.      Gait: Gait (gait at baseline) normal.   Psychiatric:         Judgment: Judgment normal.          DX-WRIST-COMPLETE 3+ LEFT  Order: [id number]  Status:  Final result   Visible to patient:  No (scheduled for 8/1/2021  9:48 PM) Next appt:  None Dx:  Sprain of left wrist, initial encounter   0 Result Notes  Details    Reading Physician Reading Date Result Priority   Doe Lee M.D.  [phone] 7/31/2021 Urgent Care      Narrative & Impression     7/31/2021 11:28 PM     HISTORY/REASON FOR EXAM:  Pain/Deformity Following Trauma        TECHNIQUE/EXAM DESCRIPTION AND NUMBER OF VIEWS:  3 views of the LEFT wrist.     COMPARISON: None     FINDINGS:     Diffuse osseous mineralization.     Acute impacted and displaced fracture of the distal radius.     Acute comminuted mildly displaced oblique fracture of the distal ulna.     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     Acute  comminuted mildly displaced oblique fracture of the distal ulna.         Last Resulted: 07/31/21 11:45 PM                 Assessment/Plan:   1. Closed fracture of distal end of left radius, unspecified fracture morphology, initial encounter  - REFERRAL TO ORTHOPEDICS    2. Sprain of left wrist, initial encounter  - DX-WRIST-COMPLETE 3+ LEFT; Future    3. Closed fracture of distal end of left ulna, unspecified fracture morphology, initial encounter  - REFERRAL TO ORTHOPEDICS    Other orders  - ALPRAZolam (XANAX) 0.5 MG Tab; Take 0.5 mg by mouth 3 times a day as needed for Sleep.        Medical Decision Making/Course:  In the course of preparing for this visit with review of the pertinent past medical history, recent and past clinic visits, current medications, and performing chart, immunization, medical history and medication reconciliation, and in the further course of obtaining the current history pertinent to the clinic visit today, performing an exam and evaluation, ordering and independently evaluating labs, tests, and/or procedures including application of sugar tong wrist splint, prescribing any recommended new medications as noted above, providing any pertinent counseling and education and recommending further coordination of care including initiation of urgent orthopedic surgery consultation and discussion with the patient's potential necessity for open reduction internal fixation for displaced radius and ulna fracture, at least 45 minutes of total time were spent during this encounter.          Differential diagnosis, natural history, supportive care, and indications for immediate follow-up discussed.     Advised the patient to follow-up with the primary care physician for recheck, reevaluation, and consideration of further management.    Please note that this dictation was created using voice recognition software. I have worked with consultants from the vendor as well as technical experts from [facility]  [employer] to optimize the interface. I have made every reasonable attempt to correct obvious errors, but I expect that there are errors of grammar and possibly content that I did not discover before finalizing the note.

## 2021-08-01 NOTE — PROCEDURES
Left sugar tong splint application    Date/Time: 8/1/2021 12:02 AM  Performed by: Jose L Boogie M.D.  Authorized by: Jose L Boogie M.D.   Patient tolerance: patient tolerated the procedure well with no immediate complications  Comments: Stockinette was applied and then left sugar tong splint was applied by the provider.  The patient tolerated procedure well.  Post procedure pulses and neurovascular exam was intact.

## 2022-02-14 NOTE — PROGRESS NOTES
Pt somnolent, VSS, but hard to arouse. No PRN pain medication requested or given thus far this shift.  monitor in place and functional. Will continue to monitor.   RNs received \"Message Routing\" forwarded by pt's pcp, Dr. Kendall:  \"Rolando Kendall MD  [facility] Rn Only [address]  Please call pt for TCM\"    Discharge disposition call, post hospital follow up.     Called and spoke with pt.    TCM Call Complete: Date  02/14/2022     Admission Date:  02/12/2022  Discharge Date:  02/13/2022  Discharged From:  [facility].     Discharge Diagnosis:  -Acute metabolic encephalopathy     Procedures (if any): None noted in discharge summary.    Immediate Concerns or needs (Y or N): No, pt reports she is feeling fine.  She reports she \"accidentally took an extra clonazepam\" and that made her really tired. Pt reports she is feeling well now.  Pt is not having any slurring of her speech and she does not sound confused. Pt denies any dizziness, CP, SOB, HA, vision changes, or weakness.      RN advised pt to very closely self monitor and if any of her s/s return or she develops additional, potentially life threatening, s/s, as were discussed with pt, she is to immediately dial 911 and get to the nearest ED for evaluation, pt reports understanding and is very agreeable.      Medication Changes (Y or N): No-  No changes were made to your medications.  -Patient reports being aware of, understanding, and agreeing with the medications she is taking and denies having any questions regarding her mediations at this time.  RN strongly advised pt review her medications and formulate a system in order to ensure she does not make errors in the future, pt is agreeable and states she purchased a pill box and feels this will be very helpful.      Refills needed (Y or N): No.    Medication reconciliation was performed prior to hospital discharge.  Pt verifies she has received the discharge after visit summary including the discharge medication list and is taking medication as recommended on discharge medication list.     Patient has no  questions or concerns regarding her discharge instructions.     Dr. Kendall does not have any PHF appt availability for the next 2 weeks.  Informed pt that a message will be sent to Dr. Kendall requesting a PHF appt and Dr. Kendall's CMA will reach back out to pt with date and time of the PHF appt.  Pt is very agreeable and reports she can only come for appts on Mondays, Wednesdays, and Fridays as this is when she can get transportation, RN will make provider aware.   Instructed pt to call office if she does not receive a callback with the appt date and time within the next 24-48 hours, pt reports understanding and is very agreeable.      Patient was advised to bring discharge instructions and all of her medications along with her to her PHF appt once it has been arranged, pt very agreeable.

## 2024-07-17 PROBLEM — T84.021A FAILURE OF LEFT TOTAL HIP ARTHROPLASTY WITH DISLOCATION OF HIP (HCC): Status: ACTIVE | Noted: 2024-07-15

## 2024-08-26 PROBLEM — I97.89 NECROSIS OF SURGICAL WOUND (HCC): Status: ACTIVE | Noted: 2024-08-26

## 2024-08-26 PROBLEM — I96 NECROSIS OF SURGICAL WOUND (HCC): Status: ACTIVE | Noted: 2024-08-26

## 2025-04-17 ENCOUNTER — APPOINTMENT (OUTPATIENT)
Dept: RADIOLOGY | Facility: MEDICAL CENTER | Age: 75
DRG: 917 | End: 2025-04-17
Attending: EMERGENCY MEDICINE
Payer: MEDICARE

## 2025-04-17 ENCOUNTER — APPOINTMENT (OUTPATIENT)
Dept: RADIOLOGY | Facility: MEDICAL CENTER | Age: 75
DRG: 917 | End: 2025-04-17
Attending: INTERNAL MEDICINE
Payer: MEDICARE

## 2025-04-17 ENCOUNTER — APPOINTMENT (OUTPATIENT)
Dept: RADIOLOGY | Facility: MEDICAL CENTER | Age: 75
DRG: 917 | End: 2025-04-17
Payer: MEDICARE

## 2025-04-17 ENCOUNTER — HOSPITAL ENCOUNTER (INPATIENT)
Facility: MEDICAL CENTER | Age: 75
LOS: 5 days | DRG: 917 | End: 2025-04-22
Attending: EMERGENCY MEDICINE | Admitting: INTERNAL MEDICINE
Payer: MEDICARE

## 2025-04-17 ENCOUNTER — APPOINTMENT (OUTPATIENT)
Dept: RADIOLOGY | Facility: MEDICAL CENTER | Age: 75
DRG: 917 | End: 2025-04-17
Attending: STUDENT IN AN ORGANIZED HEALTH CARE EDUCATION/TRAINING PROGRAM
Payer: MEDICARE

## 2025-04-17 DIAGNOSIS — G89.29 OTHER CHRONIC PAIN: ICD-10-CM

## 2025-04-17 DIAGNOSIS — J18.9 COMMUNITY ACQUIRED PNEUMONIA OF RIGHT LOWER LOBE OF LUNG: ICD-10-CM

## 2025-04-17 DIAGNOSIS — A41.9 SEPSIS, DUE TO UNSPECIFIED ORGANISM, UNSPECIFIED WHETHER ACUTE ORGAN DYSFUNCTION PRESENT (HCC): ICD-10-CM

## 2025-04-17 DIAGNOSIS — R41.82 ALTERED MENTAL STATUS, UNSPECIFIED ALTERED MENTAL STATUS TYPE: ICD-10-CM

## 2025-04-17 DIAGNOSIS — R50.9 FEVER, UNSPECIFIED FEVER CAUSE: ICD-10-CM

## 2025-04-17 DIAGNOSIS — Z79.891 CHRONIC PRESCRIPTION OPIATE USE: ICD-10-CM

## 2025-04-17 DIAGNOSIS — J96.01 ACUTE RESPIRATORY FAILURE WITH HYPOXIA (HCC): ICD-10-CM

## 2025-04-17 PROBLEM — R79.89 ELEVATED TROPONIN: Status: ACTIVE | Noted: 2025-04-17

## 2025-04-17 PROBLEM — J96.91 RESPIRATORY FAILURE WITH HYPOXIA (HCC): Status: ACTIVE | Noted: 2025-04-17

## 2025-04-17 PROBLEM — N18.30 STAGE 3 CHRONIC KIDNEY DISEASE: Status: ACTIVE | Noted: 2025-04-17

## 2025-04-17 PROBLEM — F43.10 PTSD (POST-TRAUMATIC STRESS DISORDER): Status: ACTIVE | Noted: 2025-04-17

## 2025-04-17 PROBLEM — R79.89 ELEVATED BRAIN NATRIURETIC PEPTIDE (BNP) LEVEL: Status: ACTIVE | Noted: 2025-04-17

## 2025-04-17 PROBLEM — E46 MALNUTRITION (HCC): Status: ACTIVE | Noted: 2025-04-17

## 2025-04-17 LAB
ALBUMIN SERPL BCP-MCNC: 3.2 G/DL (ref 3.2–4.9)
ALBUMIN/GLOB SERPL: 0.6 G/DL
ALP SERPL-CCNC: 134 U/L (ref 30–99)
ALT SERPL-CCNC: 27 U/L (ref 2–50)
AMPHET UR QL SCN: POSITIVE
ANION GAP SERPL CALC-SCNC: 15 MMOL/L (ref 7–16)
APAP SERPL-MCNC: <5 UG/ML (ref 10–30)
APPEARANCE UR: ABNORMAL
AST SERPL-CCNC: 70 U/L (ref 12–45)
BACTERIA #/AREA URNS HPF: ABNORMAL /HPF
BARBITURATES UR QL SCN: NEGATIVE
BASE EXCESS BLDA CALC-SCNC: 1 MMOL/L (ref -4–3)
BASOPHILS # BLD AUTO: 0 % (ref 0–1.8)
BASOPHILS # BLD: 0 K/UL (ref 0–0.12)
BENZODIAZ UR QL SCN: POSITIVE
BILIRUB SERPL-MCNC: 0.6 MG/DL (ref 0.1–1.5)
BILIRUB UR QL STRIP.AUTO: NEGATIVE
BODY TEMPERATURE: ABNORMAL DEGREES
BREATHS SETTING VENT: 24
BUN SERPL-MCNC: 24 MG/DL (ref 8–22)
BZE UR QL SCN: NEGATIVE
CALCIUM ALBUM COR SERPL-MCNC: 9.6 MG/DL (ref 8.5–10.5)
CALCIUM SERPL-MCNC: 9 MG/DL (ref 8.5–10.5)
CANNABINOIDS UR QL SCN: POSITIVE
CASTS URNS QL MICRO: ABNORMAL /LPF (ref 0–2)
CHLORIDE SERPL-SCNC: 98 MMOL/L (ref 96–112)
CK SERPL-CCNC: 262 U/L (ref 0–154)
CO2 BLDA-SCNC: 27 MMOL/L (ref 32–48)
CO2 SERPL-SCNC: 20 MMOL/L (ref 20–33)
COLOR UR: ABNORMAL
COMMENT NL1176: NORMAL
CREAT SERPL-MCNC: 1.2 MG/DL (ref 0.5–1.4)
CREAT UR-MCNC: 23.8 MG/DL
CREAT UR-MCNC: 24.1 MG/DL
DELSYS IDSYS: ABNORMAL
EKG IMPRESSION: NORMAL
EOSINOPHIL # BLD AUTO: 0 K/UL (ref 0–0.51)
EOSINOPHIL NFR BLD: 0 % (ref 0–6.9)
EPITHELIAL CELLS 1715: ABNORMAL /HPF (ref 0–5)
ERYTHROCYTE [DISTWIDTH] IN BLOOD BY AUTOMATED COUNT: 48.1 FL (ref 35.9–50)
ETHANOL BLD-MCNC: <10.1 MG/DL
FENTANYL UR QL: POSITIVE
FLUAV RNA SPEC QL NAA+PROBE: NEGATIVE
FLUBV RNA SPEC QL NAA+PROBE: NEGATIVE
GFR SERPLBLD CREATININE-BSD FMLA CKD-EPI: 48 ML/MIN/1.73 M 2
GLOBULIN SER CALC-MCNC: 5.2 G/DL (ref 1.9–3.5)
GLUCOSE BLD STRIP.AUTO-MCNC: 140 MG/DL (ref 65–99)
GLUCOSE SERPL-MCNC: 153 MG/DL (ref 65–99)
GLUCOSE UR STRIP.AUTO-MCNC: NEGATIVE MG/DL
GRANULAR CASTS  1716G: PRESENT /LPF
HCO3 BLDA-SCNC: 25.7 MMOL/L (ref 21–28)
HCT VFR BLD AUTO: 41.8 % (ref 37–47)
HGB BLD-MCNC: 13.1 G/DL (ref 12–16)
HOROWITZ INDEX BLDA+IHG-RTO: 73 MM[HG]
HYALINE CAST   1831: PRESENT /LPF
INR PPP: 1.31 (ref 0.87–1.13)
KETONES UR STRIP.AUTO-MCNC: NEGATIVE MG/DL
LACTATE BLD-SCNC: 2.2 MMOL/L (ref 0.5–2)
LACTATE SERPL-SCNC: 1.8 MMOL/L (ref 0.5–2)
LACTATE SERPL-SCNC: 2.2 MMOL/L (ref 0.5–2)
LACTATE SERPL-SCNC: 2.7 MMOL/L (ref 0.5–2)
LACTATE SERPL-SCNC: 3.6 MMOL/L (ref 0.5–2)
LEUKOCYTE ESTERASE UR QL STRIP.AUTO: NEGATIVE
LYMPHOCYTES # BLD AUTO: 0.71 K/UL (ref 1–4.8)
LYMPHOCYTES NFR BLD: 4.3 % (ref 22–41)
MANUAL DIFF BLD: NORMAL
MCH RBC QN AUTO: 26.5 PG (ref 27–33)
MCHC RBC AUTO-ENTMCNC: 31.3 G/DL (ref 32.2–35.5)
MCV RBC AUTO: 84.4 FL (ref 81.4–97.8)
METHADONE UR QL SCN: POSITIVE
MICRO URNS: ABNORMAL
MICROALBUMIN UR-MCNC: 7.1 MG/DL
MICROALBUMIN/CREAT UR: 298 MG/G (ref 0–30)
MODE IMODE: ABNORMAL
MONOCYTES # BLD AUTO: 0.28 K/UL (ref 0–0.85)
MONOCYTES NFR BLD AUTO: 1.7 % (ref 0–13.4)
MORPHOLOGY BLD-IMP: NORMAL
MUCOUS THREADS URNS QL MICRO: PRESENT /HPF
NEUTROPHILS # BLD AUTO: 15.51 K/UL (ref 1.82–7.42)
NEUTROPHILS NFR BLD: 92.3 % (ref 44–72)
NEUTS BAND NFR BLD MANUAL: 1.7 % (ref 0–10)
NITRITE UR QL STRIP.AUTO: NEGATIVE
NRBC # BLD AUTO: 0 K/UL
NRBC BLD-RTO: 0 /100 WBC (ref 0–0.2)
NT-PROBNP SERPL IA-MCNC: 3877 PG/ML (ref 0–125)
O2/TOTAL GAS SETTING VFR VENT: 100 %
OPIATES UR QL SCN: NEGATIVE
OXYCODONE UR QL SCN: NEGATIVE
PCO2 BLDA: 40.7 MMHG (ref 32–48)
PCO2 TEMP ADJ BLDA: 44 MMHG (ref 32–48)
PCP UR QL SCN: NEGATIVE
PEEP END EXPIRATORY PRESSURE IPEEP: 10 CMH20
PH BLDA: 7.41 [PH] (ref 7.35–7.45)
PH TEMP ADJ BLDA: 7.38 [PH] (ref 7.35–7.45)
PH UR STRIP.AUTO: 5.5 [PH] (ref 5–8)
PLATELET # BLD AUTO: 336 K/UL (ref 164–446)
PLATELET BLD QL SMEAR: NORMAL
PMV BLD AUTO: 9.4 FL (ref 9–12.9)
PO2 BLDA: 73 MMHG (ref 83–108)
PO2 TEMP ADJ BLDA: 83 MMHG (ref 83–108)
POTASSIUM SERPL-SCNC: 4.2 MMOL/L (ref 3.6–5.5)
PROCALCITONIN SERPL-MCNC: 2.35 NG/ML
PROCALCITONIN SERPL-MCNC: 4.02 NG/ML
PROPOXYPH UR QL SCN: NEGATIVE
PROT SERPL-MCNC: 8.4 G/DL (ref 6–8.2)
PROT UR QL STRIP: 300 MG/DL
PROT UR-MCNC: 24.2 MG/DL (ref 0–15)
PROT/CREAT UR: 1004 MG/G (ref 10–107)
PROTHROMBIN TIME: 16.4 SEC (ref 12–14.6)
RBC # BLD AUTO: 4.95 M/UL (ref 4.2–5.4)
RBC # URNS HPF: ABNORMAL /HPF (ref 0–2)
RBC BLD AUTO: PRESENT
RBC UR QL AUTO: ABNORMAL
RSV RNA SPEC QL NAA+PROBE: NEGATIVE
SALICYLATES SERPL-MCNC: <1 MG/DL (ref 15–25)
SAO2 % BLDA: 95 % (ref 93–99)
SARS-COV-2 RNA RESP QL NAA+PROBE: NOTDETECTED
SODIUM SERPL-SCNC: 133 MMOL/L (ref 135–145)
SP GR UR STRIP.AUTO: 1.02
SPECIMEN DRAWN FROM PATIENT: ABNORMAL
TIDAL VOLUME IVT: 370 ML
TOXIC GRANULES BLD QL SMEAR: NORMAL
TRIGL SERPL-MCNC: 170 MG/DL (ref 0–149)
TRIGL SERPL-MCNC: 204 MG/DL (ref 0–149)
TROPONIN T SERPL-MCNC: 137 NG/L (ref 6–19)
TROPONIN T SERPL-MCNC: 142 NG/L (ref 6–19)
TROPONIN T SERPL-MCNC: 173 NG/L (ref 6–19)
UROBILINOGEN UR STRIP.AUTO-MCNC: 1 EU/DL
WBC # BLD AUTO: 16.5 K/UL (ref 4.8–10.8)
WBC #/AREA URNS HPF: ABNORMAL /HPF
WBC TOXIC VACUOLES BLD QL SMEAR: NORMAL

## 2025-04-17 PROCEDURE — 84145 PROCALCITONIN (PCT): CPT

## 2025-04-17 PROCEDURE — 85027 COMPLETE CBC AUTOMATED: CPT

## 2025-04-17 PROCEDURE — 700102 HCHG RX REV CODE 250 W/ 637 OVERRIDE(OP): Mod: UD | Performed by: EMERGENCY MEDICINE

## 2025-04-17 PROCEDURE — 80307 DRUG TEST PRSMV CHEM ANLYZR: CPT

## 2025-04-17 PROCEDURE — 97602 WOUND(S) CARE NON-SELECTIVE: CPT

## 2025-04-17 PROCEDURE — 74177 CT ABD & PELVIS W/CONTRAST: CPT

## 2025-04-17 PROCEDURE — 0241U HCHG SARS-COV-2 COVID-19 NFCT DS RESP RNA 4 TRGT ED POC: CPT

## 2025-04-17 PROCEDURE — 99291 CRITICAL CARE FIRST HOUR: CPT | Performed by: INTERNAL MEDICINE

## 2025-04-17 PROCEDURE — 92950 HEART/LUNG RESUSCITATION CPR: CPT

## 2025-04-17 PROCEDURE — 99291 CRITICAL CARE FIRST HOUR: CPT

## 2025-04-17 PROCEDURE — 36415 COLL VENOUS BLD VENIPUNCTURE: CPT

## 2025-04-17 PROCEDURE — 94799 UNLISTED PULMONARY SVC/PX: CPT

## 2025-04-17 PROCEDURE — 71045 X-RAY EXAM CHEST 1 VIEW: CPT

## 2025-04-17 PROCEDURE — 84484 ASSAY OF TROPONIN QUANT: CPT

## 2025-04-17 PROCEDURE — 83880 ASSAY OF NATRIURETIC PEPTIDE: CPT

## 2025-04-17 PROCEDURE — 80143 DRUG ASSAY ACETAMINOPHEN: CPT

## 2025-04-17 PROCEDURE — 70450 CT HEAD/BRAIN W/O DYE: CPT

## 2025-04-17 PROCEDURE — A9270 NON-COVERED ITEM OR SERVICE: HCPCS | Performed by: INTERNAL MEDICINE

## 2025-04-17 PROCEDURE — 96375 TX/PRO/DX INJ NEW DRUG ADDON: CPT

## 2025-04-17 PROCEDURE — 82550 ASSAY OF CK (CPK): CPT

## 2025-04-17 PROCEDURE — 31500 INSERT EMERGENCY AIRWAY: CPT

## 2025-04-17 PROCEDURE — 99292 CRITICAL CARE ADDL 30 MIN: CPT | Performed by: INTERNAL MEDICINE

## 2025-04-17 PROCEDURE — 85007 BL SMEAR W/DIFF WBC COUNT: CPT

## 2025-04-17 PROCEDURE — 82962 GLUCOSE BLOOD TEST: CPT

## 2025-04-17 PROCEDURE — 82043 UR ALBUMIN QUANTITATIVE: CPT

## 2025-04-17 PROCEDURE — 0DH67UZ INSERTION OF FEEDING DEVICE INTO STOMACH, VIA NATURAL OR ARTIFICIAL OPENING: ICD-10-PCS | Performed by: EMERGENCY MEDICINE

## 2025-04-17 PROCEDURE — 85610 PROTHROMBIN TIME: CPT

## 2025-04-17 PROCEDURE — 87086 URINE CULTURE/COLONY COUNT: CPT

## 2025-04-17 PROCEDURE — 96366 THER/PROPH/DIAG IV INF ADDON: CPT

## 2025-04-17 PROCEDURE — 36600 WITHDRAWAL OF ARTERIAL BLOOD: CPT

## 2025-04-17 PROCEDURE — 94002 VENT MGMT INPAT INIT DAY: CPT

## 2025-04-17 PROCEDURE — 700111 HCHG RX REV CODE 636 W/ 250 OVERRIDE (IP): Mod: JZ,UD | Performed by: EMERGENCY MEDICINE

## 2025-04-17 PROCEDURE — 0BH17EZ INSERTION OF ENDOTRACHEAL AIRWAY INTO TRACHEA, VIA NATURAL OR ARTIFICIAL OPENING: ICD-10-PCS | Performed by: EMERGENCY MEDICINE

## 2025-04-17 PROCEDURE — 84478 ASSAY OF TRIGLYCERIDES: CPT

## 2025-04-17 PROCEDURE — 96365 THER/PROPH/DIAG IV INF INIT: CPT

## 2025-04-17 PROCEDURE — 87040 BLOOD CULTURE FOR BACTERIA: CPT

## 2025-04-17 PROCEDURE — 302214 INTUBATION BOX: Performed by: EMERGENCY MEDICINE

## 2025-04-17 PROCEDURE — 80053 COMPREHEN METABOLIC PANEL: CPT

## 2025-04-17 PROCEDURE — 700111 HCHG RX REV CODE 636 W/ 250 OVERRIDE (IP): Mod: JZ,UD

## 2025-04-17 PROCEDURE — 82570 ASSAY OF URINE CREATININE: CPT

## 2025-04-17 PROCEDURE — 700102 HCHG RX REV CODE 250 W/ 637 OVERRIDE(OP): Performed by: INTERNAL MEDICINE

## 2025-04-17 PROCEDURE — 82803 BLOOD GASES ANY COMBINATION: CPT

## 2025-04-17 PROCEDURE — 93005 ELECTROCARDIOGRAM TRACING: CPT | Mod: TC | Performed by: EMERGENCY MEDICINE

## 2025-04-17 PROCEDURE — 770022 HCHG ROOM/CARE - ICU (200)

## 2025-04-17 PROCEDURE — 94003 VENT MGMT INPAT SUBQ DAY: CPT

## 2025-04-17 PROCEDURE — 700105 HCHG RX REV CODE 258: Performed by: INTERNAL MEDICINE

## 2025-04-17 PROCEDURE — 83605 ASSAY OF LACTIC ACID: CPT | Mod: 91

## 2025-04-17 PROCEDURE — 700117 HCHG RX CONTRAST REV CODE 255: Performed by: INTERNAL MEDICINE

## 2025-04-17 PROCEDURE — 71275 CT ANGIOGRAPHY CHEST: CPT

## 2025-04-17 PROCEDURE — 81001 URINALYSIS AUTO W/SCOPE: CPT

## 2025-04-17 PROCEDURE — 80179 DRUG ASSAY SALICYLATE: CPT

## 2025-04-17 PROCEDURE — 700101 HCHG RX REV CODE 250: Mod: UD | Performed by: EMERGENCY MEDICINE

## 2025-04-17 PROCEDURE — 82077 ASSAY SPEC XCP UR&BREATH IA: CPT

## 2025-04-17 PROCEDURE — A9270 NON-COVERED ITEM OR SERVICE: HCPCS | Mod: UD | Performed by: EMERGENCY MEDICINE

## 2025-04-17 PROCEDURE — 700111 HCHG RX REV CODE 636 W/ 250 OVERRIDE (IP): Performed by: INTERNAL MEDICINE

## 2025-04-17 PROCEDURE — 5A1945Z RESPIRATORY VENTILATION, 24-96 CONSECUTIVE HOURS: ICD-10-PCS | Performed by: EMERGENCY MEDICINE

## 2025-04-17 PROCEDURE — 84156 ASSAY OF PROTEIN URINE: CPT

## 2025-04-17 RX ORDER — ROCURONIUM BROMIDE 10 MG/ML
INJECTION, SOLUTION INTRAVENOUS
Status: COMPLETED | OUTPATIENT
Start: 2025-04-17 | End: 2025-04-17

## 2025-04-17 RX ORDER — ACETAMINOPHEN 650 MG/1
650 SUPPOSITORY RECTAL ONCE
Status: COMPLETED | OUTPATIENT
Start: 2025-04-17 | End: 2025-04-17

## 2025-04-17 RX ORDER — LABETALOL HYDROCHLORIDE 5 MG/ML
10 INJECTION, SOLUTION INTRAVENOUS EVERY 4 HOURS PRN
Status: DISCONTINUED | OUTPATIENT
Start: 2025-04-17 | End: 2025-04-20

## 2025-04-17 RX ORDER — FAMOTIDINE 20 MG/1
20 TABLET, FILM COATED ORAL DAILY
Status: DISCONTINUED | OUTPATIENT
Start: 2025-04-17 | End: 2025-04-19

## 2025-04-17 RX ORDER — CITALOPRAM HYDROBROMIDE 20 MG/1
10 TABLET ORAL DAILY
Status: DISCONTINUED | OUTPATIENT
Start: 2025-04-17 | End: 2025-04-20

## 2025-04-17 RX ORDER — NALOXONE HYDROCHLORIDE 1 MG/ML
INJECTION INTRAMUSCULAR; INTRAVENOUS; SUBCUTANEOUS
Status: DISPENSED
Start: 2025-04-17 | End: 2025-04-17

## 2025-04-17 RX ORDER — FUROSEMIDE 10 MG/ML
40 INJECTION INTRAMUSCULAR; INTRAVENOUS DAILY
Status: DISCONTINUED | OUTPATIENT
Start: 2025-04-17 | End: 2025-04-19

## 2025-04-17 RX ORDER — AMOXICILLIN 250 MG
2 CAPSULE ORAL 2 TIMES DAILY
Status: DISCONTINUED | OUTPATIENT
Start: 2025-04-17 | End: 2025-04-20

## 2025-04-17 RX ORDER — ENOXAPARIN SODIUM 100 MG/ML
30 INJECTION SUBCUTANEOUS DAILY
Status: DISCONTINUED | OUTPATIENT
Start: 2025-04-17 | End: 2025-04-22 | Stop reason: HOSPADM

## 2025-04-17 RX ORDER — BISACODYL 10 MG
10 SUPPOSITORY, RECTAL RECTAL
Status: DISCONTINUED | OUTPATIENT
Start: 2025-04-17 | End: 2025-04-20

## 2025-04-17 RX ORDER — NOREPINEPHRINE BITARTRATE 0.03 MG/ML
0-1 INJECTION, SOLUTION INTRAVENOUS CONTINUOUS
Status: DISCONTINUED | OUTPATIENT
Start: 2025-04-17 | End: 2025-04-18

## 2025-04-17 RX ORDER — POLYETHYLENE GLYCOL 3350 17 G/17G
1 POWDER, FOR SOLUTION ORAL
Status: DISCONTINUED | OUTPATIENT
Start: 2025-04-17 | End: 2025-04-20

## 2025-04-17 RX ORDER — CITALOPRAM HYDROBROMIDE 10 MG/1
10 TABLET ORAL DAILY
Status: ON HOLD | COMMUNITY
End: 2025-04-22

## 2025-04-17 RX ORDER — IPRATROPIUM BROMIDE AND ALBUTEROL SULFATE 2.5; .5 MG/3ML; MG/3ML
3 SOLUTION RESPIRATORY (INHALATION)
Status: DISCONTINUED | OUTPATIENT
Start: 2025-04-17 | End: 2025-04-22 | Stop reason: HOSPADM

## 2025-04-17 RX ORDER — PROPOFOL 10 MG/ML
INJECTION, EMULSION INTRAVENOUS
Status: COMPLETED | OUTPATIENT
Start: 2025-04-17 | End: 2025-04-17

## 2025-04-17 RX ORDER — ONDANSETRON 4 MG/1
4 TABLET, ORALLY DISINTEGRATING ORAL EVERY 4 HOURS PRN
Status: DISCONTINUED | OUTPATIENT
Start: 2025-04-17 | End: 2025-04-20

## 2025-04-17 RX ORDER — NALOXONE HYDROCHLORIDE 1 MG/ML
INJECTION INTRAMUSCULAR; INTRAVENOUS; SUBCUTANEOUS
Status: COMPLETED
Start: 2025-04-17 | End: 2025-04-17

## 2025-04-17 RX ORDER — SODIUM CHLORIDE, SODIUM LACTATE, POTASSIUM CHLORIDE, AND CALCIUM CHLORIDE .6; .31; .03; .02 G/100ML; G/100ML; G/100ML; G/100ML
30 INJECTION, SOLUTION INTRAVENOUS ONCE
Status: COMPLETED | OUTPATIENT
Start: 2025-04-17 | End: 2025-04-18

## 2025-04-17 RX ORDER — ETHYL ALCOHOL 62 %
1 SWAB, MEDICATED TOPICAL 2 TIMES DAILY
Status: DISCONTINUED | OUTPATIENT
Start: 2025-04-17 | End: 2025-04-21 | Stop reason: ALTCHOICE

## 2025-04-17 RX ORDER — CEFTRIAXONE 2 G/1
2000 INJECTION, POWDER, FOR SOLUTION INTRAMUSCULAR; INTRAVENOUS ONCE
Status: COMPLETED | OUTPATIENT
Start: 2025-04-17 | End: 2025-04-17

## 2025-04-17 RX ORDER — AZITHROMYCIN 250 MG/1
500 TABLET, FILM COATED ORAL DAILY
Status: COMPLETED | OUTPATIENT
Start: 2025-04-17 | End: 2025-04-19

## 2025-04-17 RX ORDER — ONDANSETRON 2 MG/ML
4 INJECTION INTRAMUSCULAR; INTRAVENOUS EVERY 4 HOURS PRN
Status: DISCONTINUED | OUTPATIENT
Start: 2025-04-17 | End: 2025-04-20

## 2025-04-17 RX ORDER — SODIUM CHLORIDE, SODIUM LACTATE, POTASSIUM CHLORIDE, AND CALCIUM CHLORIDE .6; .31; .03; .02 G/100ML; G/100ML; G/100ML; G/100ML
500 INJECTION, SOLUTION INTRAVENOUS
Status: DISCONTINUED | OUTPATIENT
Start: 2025-04-17 | End: 2025-04-20

## 2025-04-17 RX ADMIN — FENTANYL CITRATE 100 MCG: 50 INJECTION, SOLUTION INTRAMUSCULAR; INTRAVENOUS at 23:55

## 2025-04-17 RX ADMIN — NALOXONE HYDROCHLORIDE 2 MG: 1 INJECTION INTRAMUSCULAR; INTRAVENOUS; SUBCUTANEOUS at 11:26

## 2025-04-17 RX ADMIN — PROPOFOL 50 MG: 10 INJECTION, EMULSION INTRAVENOUS at 11:48

## 2025-04-17 RX ADMIN — Medication 1 APPLICATOR: at 17:46

## 2025-04-17 RX ADMIN — IOHEXOL 80 ML: 350 INJECTION, SOLUTION INTRAVENOUS at 20:20

## 2025-04-17 RX ADMIN — ENOXAPARIN SODIUM 30 MG: 100 INJECTION SUBCUTANEOUS at 17:47

## 2025-04-17 RX ADMIN — SODIUM CHLORIDE, POTASSIUM CHLORIDE, SODIUM LACTATE AND CALCIUM CHLORIDE 1362 ML: 600; 310; 30; 20 INJECTION, SOLUTION INTRAVENOUS at 15:59

## 2025-04-17 RX ADMIN — CEFTRIAXONE SODIUM 2000 MG: 2 INJECTION, POWDER, FOR SOLUTION INTRAMUSCULAR; INTRAVENOUS at 12:37

## 2025-04-17 RX ADMIN — AZITHROMYCIN DIHYDRATE 500 MG: 250 TABLET ORAL at 16:43

## 2025-04-17 RX ADMIN — PROPOFOL 1000 MG: 10 INJECTION, EMULSION INTRAVENOUS at 11:57

## 2025-04-17 RX ADMIN — FUROSEMIDE 40 MG: 10 INJECTION, SOLUTION INTRAVENOUS at 12:36

## 2025-04-17 RX ADMIN — ROCURONIUM BROMIDE 50 MG: 10 INJECTION INTRAVENOUS at 11:48

## 2025-04-17 RX ADMIN — FAMOTIDINE 20 MG: 20 TABLET, FILM COATED ORAL at 16:43

## 2025-04-17 RX ADMIN — CITALOPRAM HYDROBROMIDE 10 MG: 20 TABLET ORAL at 16:43

## 2025-04-17 RX ADMIN — PROPOFOL 40 MCG/KG/MIN: 10 INJECTION, EMULSION INTRAVENOUS at 15:47

## 2025-04-17 RX ADMIN — PROPOFOL 35 MCG/KG/MIN: 10 INJECTION, EMULSION INTRAVENOUS at 13:18

## 2025-04-17 RX ADMIN — ACETAMINOPHEN 650 MG: 650 SUPPOSITORY RECTAL at 13:13

## 2025-04-17 ASSESSMENT — COGNITIVE AND FUNCTIONAL STATUS - GENERAL
WALKING IN HOSPITAL ROOM: TOTAL
STANDING UP FROM CHAIR USING ARMS: TOTAL
TURNING FROM BACK TO SIDE WHILE IN FLAT BAD: TOTAL
SUGGESTED CMS G CODE MODIFIER MOBILITY: CN
DRESSING REGULAR UPPER BODY CLOTHING: TOTAL
TOILETING: TOTAL
CLIMB 3 TO 5 STEPS WITH RAILING: TOTAL
DAILY ACTIVITIY SCORE: 6
HELP NEEDED FOR BATHING: TOTAL
EATING MEALS: TOTAL
DRESSING REGULAR LOWER BODY CLOTHING: TOTAL
MOBILITY SCORE: 6
MOVING FROM LYING ON BACK TO SITTING ON SIDE OF FLAT BED: TOTAL
MOVING TO AND FROM BED TO CHAIR: TOTAL
SUGGESTED CMS G CODE MODIFIER DAILY ACTIVITY: CN
PERSONAL GROOMING: TOTAL

## 2025-04-17 ASSESSMENT — PAIN DESCRIPTION - PAIN TYPE
TYPE: ACUTE PAIN

## 2025-04-17 ASSESSMENT — FIBROSIS 4 INDEX
FIB4 SCORE: 2.89
FIB4 SCORE: 2.32

## 2025-04-17 NOTE — ED NOTES
Pt has multiple rings on her fingers and they are tight fingers are swollen around. Attempted to remove manually but they will not come off. Will utilize ring cutter and place in pt belongings bag.

## 2025-04-17 NOTE — RESPIRATORY CARE
Patient intubated in ED with 7.5 ETT, 23 cm at the gums, dentures removed. Bilateral breath sounds, positive color change noted.

## 2025-04-17 NOTE — CARE PLAN
Problem: Ventilation  Goal: Ability to achieve and maintain unassisted ventilation or tolerate decreased levels of ventilator support  Description: Target End Date:  4 days Document on Vent flowsheet 1.  Support and monitor invasive and noninvasive mechanical ventilation 2.  Monitor ventilator weaning response 3.  Perform ventilator associated pneumonia prevention interventions 4.  Manage ventilation therapy by monitoring diagnostic test results  Outcome: Not Met     Ventilator Daily Summary    Vent Day #1  Airway: 7.5 @ 24    Ventilator settings: APV/CMV -- 24 / 370 / 12+ / 80%   Weaning trials: NONE  Respiratory Procedures: NONE     Plan: Continue current ventilator settings and wean mechanical ventilation as tolerated per physician orders.

## 2025-04-17 NOTE — ED TRIAGE NOTES
Pt bib ems c/o aloc found on the floor of her house by friend with agonal respirations ems found a bottle of methadone and antibiotics near pt. LKW last pm 2230. Pt gcs per ems improved from 6 to 10 after narcan. ERP called to bedside

## 2025-04-17 NOTE — WOUND TEAM
[facility]  Inpatient Services  Initial Wound and Skin Care Evaluation    Admission Date: 2025     Last order of IP CONSULT TO WOUND CARE was found on 2025 from Hospital Encounter on 2025     HPI, PMH, SH: Reviewed    Past Surgical History:   Procedure Laterality Date    PB SECD CLOS SURG WND EXTEN/COMPLIC Left 2024    Procedure: CLOSURE, WOUND, SECONDARY;  Surgeon: Axel Roberto M.D.;  Location: [facility];  Service: Orthopedics    PB REVISE ACETABULAR PART OF TOTAL HIP Left 2024    Procedure: LEFT TOTAL HIP ARTHROPLASTY REVISION;  Surgeon: Axel Roberto M.D.;  Location: [facility];  Service: Orthopedics    HIP ARTHROPLASTY TOTAL Left 3/7/2017    Procedure: HIP ARTHROPLASTY TOTAL;  Surgeon: Serafin Fitzgerald M.D.;  Location: [facility];  Service:     CERVICAL DISK AND FUSION ANTERIOR N/A 2016    Procedure: CERVICAL DISK AND FUSION ANTERIOR C3-5  ;  Surgeon: Dieudonne Hill M.D.;  Location: SURGERY [facility];  Service:     CERVICAL FUSION POSTERIOR N/A 2016    Procedure: CERVICAL FUSION POSTERIOR OCCIPUT TO C6;  Surgeon: Dieudonne Hill M.D.;  Location: SURGERY [facility];  Service:     OTHER      MULTIPLE BACK, RIGHT HIP    NC BREAST AUGMENTATION WITH IMPLANT      TONSILLECTOMY       Social History     Tobacco Use    Smoking status: Some Days     Current packs/day: 0.00     Average packs/day: 1 pack/day for 45.0 years (45.0 ttl pk-yrs)     Types: Cigarettes     Start date: 1964     Last attempt to quit: 2009     Years since quittin.0     Passive exposure: Never    Smokeless tobacco: Never   Substance Use Topics    Alcohol use: No     Alcohol/week: 0.0 oz     Comment:                                  pt states she smoke about 4 cigs per day     Chief Complaint   Patient presents with    Drug Overdose    ALOC     Diagnosis: Respiratory failure with hypoxia (HCC) [J96.91]    Unit where  seen by Wound Team: T629/00     WOUND CONSULT RELATED TO:  Right IT , sacrum     WOUND TEAM PLAN OF CARE - Frequency of Follow-up:   Nursing to follow dressing orders written for wound care. Contact wound team if area fails to progress, deteriorates or with any questions/concerns if something comes up before next scheduled follow up (See below as to whether wound is following and frequency of wound follow up)   Not following, consult as needed  - if areas worsen     WOUND HISTORY:   Patient admitted with aloc, found down on floor.        WOUND ASSESSMENT/LDA  Wound 04/17/25 Pressure Injury Ischium Right POA DTI (Active)   Date First Assessed/Time First Assessed: 04/17/25 1500   Present on Original Admission: Yes  Primary Wound Type: Pressure Injury  Location: Ischium  Laterality: Right  Wound Description (Comments): POA DTI      Assessments 4/17/2025  2:47 PM   Site Assessment Purple;Dry;Intact   Periwound Assessment Clean;Dry;Intact   Margins Attached edges;Defined edges   Closure Open to air   Drainage Amount None   Treatments Site care;Nonselective debridement   Wound Cleansing Foam Cleanser/Washcloth   Periwound Protectant Barrier Paste   Dressing Status Clean;Dry;Intact   Dressing Cleansing/Solutions Not Applicable   Dressing Change/Treatment Frequency Every Shift, and As Needed   NEXT Dressing Change/Treatment Date 04/17/25   NEXT Weekly Photo (Inpatient Only) 04/24/25   Wound Team Following Not following   Pressure Injury Stage Deep Tissue        Vascular:    STONEY:   No results found.    Lab Values:    Lab Results   Component Value Date/Time    WBC 16.5 (H) 04/17/2025 11:20 AM    RBC 4.95 04/17/2025 11:20 AM    HEMOGLOBIN 13.1 04/17/2025 11:20 AM    HEMATOCRIT 41.8 04/17/2025 11:20 AM    CREACTPROT 0.15 04/24/2018 07:39 AM    SEDRATEWES 15 04/24/2018 07:39 AM    HBA1C 5.7 (H) 03/07/2019 07:13 AM    PLATELETCT 336 04/17/2025 11:20 AM         Culture Results show:  No results found for this or any previous visit  (from the past 720 hours).    Pain Level/Medicated:  None, Tolerated without pain medication       INTERVENTIONS BY WOUND TEAM:  Chart and images reviewed. Discussed with bedside RN. All areas of concern (based on picture review, LDA review and discussion with bedside RN) have been thoroughly assessed. Documentation of areas based on significant findings. This RN in to assess patient. Performed standard wound care which includes appropriate positioning, dressing removal and non-selective debridement. Pictures and measurements obtained weekly if/when required.    Wound:  right IT  Preparation for Dressing removal: Open to air  Cleansed/Non-selectively Debrided with:  Gauze  Lori wound: Cleansed with Gauze, Prepped with N/A  Primary Dressing:  barrier paste    Advanced Wound Care Discharge Planning  Number of Clinicians necessary to complete wound care: 1  Is patient requiring IV pain medications for dressing changes:  No   Length of time for dressing change 10 min. (This does not include chart review, pre-medication time, set up, clean up or time spent charting.)    Interdisciplinary consultation: Patient, Bedside RN, Yaritza HAMM (Wound RN).  Pressure injury and staging reviewed with N/A.    EVALUATION / RATIONALE FOR TREATMENT:     Date:  04/17/25  Wound Status:  Initial evaluation    Patient admitted with a non-blanching DTI to her right ischium (IT). Patient sacrum was intact and blanching. Left IT with mole but no injuries. Patient is incontinent of stool but has harris catheter in place. Offloading dressing applied to bony prominence. Buttocks/IT's with barrier paste due to stool. Bilateral heels are blanching, boots in place. Offloading dressing to mid back for preventative measures.          Goals: Steady decrease in wound area and depth weekly.    NURSING PLAN OF CARE ORDERS:  Skin care: See Skin Care orders    NUTRITION RECOMMENDATIONS   Wound Team Recommendations:  N/A    DIET ORDERS (From admission to next 24h)        Start     Ordered    04/17/25 1443  Diet NPO Restrict to: Strict (okay to receive meds through the NG/OG tube)  ALL MEALS        Question Answer Comment   Type: Now    Diet NPO Restrict to: Strict okay to receive meds through the NG/OG tube       04/17/25 1443    04/17/25 1443  Diet: Diet Tube Feed; Formula: Jevity; Goal Rate (mL/Hour): 25; Jevity: 1.2 RTH  ALL MEALS        Comments: Start at 25mL/hr. Do not advance until dietitian consult completed.   Question Answer Comment   Diet Diet Tube Feed    Formula: Jevity    Goal Rate (mL/Hour) 25    Jevity: 1.2 RTH        04/17/25 1443                    PREVENTATIVE INTERVENTIONS:    Q shift Pedro - performed per nursing policy  Q shift pressure point assessments - performed per nursing policy    Surface/Positioning  ICU Low Airloss - Currently in Place  Reposition q 2 hours with pillows - Currently in Place  Z Misael Pillow - available    Offloading/Redistribution  Sacral offloading dressing (Silicone dressing) - Newly Applied this Visit  Heel float boots (Prevalon boot) - Newly Applied this Visit      Respiratory  Anchorfast - Currently in Place    Containment/Moisture Prevention    Dri-misael pad - Currently in Place  Benton Catheter - Currently in Place  Barrier paste - Newly Applied this Visit    Anticipated discharge plans:  TBD        Vac Discharge Needs:  Vac Discharge plan is purely a recommendation from wound team and not a requirement for discharge unless otherwise stated by physician.  Not Applicable Pt not on a wound vac

## 2025-04-17 NOTE — ED PROVIDER NOTES
ED Provider Note    CHIEF COMPLAINT  Chief Complaint   Patient presents with    Drug Overdose    ALOC       EXTERNAL RECORDS REVIEWED  Inpatient Notes discharge summary from August 2024 reviewed.  Patient was admitted for respiratory failure    HPI/ROS  LIMITATION TO HISTORY   Select: Altered mental status / Confusion    OUTSIDE HISTORIAN(S):  EMS per report as below    Manisha Rincon is a 74 y.o. female with history of hep C, hypertension, clotting disorder who presents for evaluation of altered mental state/unresponsiveness.    Per EMS, the patient was last known well at 6:30 PM last night.  Her friend stopped by to check on her this morning at 1030 and she was found unresponsive.  Patient was given Narcan 1 mg IV and GCS went from 3-10.  Blood glucose 140s.  Patient hypoxic and placed on nonrebreather.  Currently O2 sat is 89%.  Patient is on methadone.  There were also antibiotics (?name) at the bedside.    Patient is unable to offer any history.    PAST MEDICAL HISTORY   has a past medical history of Anxiety, Arthritis, Blind left eye, Blindness of one eye, Blood clotting disorder (HCC) (2009), Blood transfusion, Chronic pain, Clotting disorder (HCC), Dental disorder, Hemorrhagic disorder (HCC), HEP C, Hepatitis C, Hypertension, Pneumonia (2006), Psychiatric disorder, and Screening mammogram (12/09).    SURGICAL HISTORY   has a past surgical history that includes other; breast augmentation with implant; tonsillectomy; cervical disk and fusion anterior (N/A, 4/8/2016); cervical fusion posterior (N/A, 4/8/2016); hip arthroplasty total (Left, 3/7/2017); revise acetabular part of total hip (Left, 7/19/2024); and secd clos surg wnd exten/complic (Left, 8/27/2024).    FAMILY HISTORY  Family History   Problem Relation Age of Onset    Cancer Father         metastatic     Cancer Sister         ? Cancer        SOCIAL HISTORY  Social History     Tobacco Use    Smoking status: Some Days     Current packs/day: 0.00      Average packs/day: 1 pack/day for 45.0 years (45.0 ttl pk-yrs)     Types: Cigarettes     Start date: 1964     Last attempt to quit: 2009     Years since quittin.0     Passive exposure: Never    Smokeless tobacco: Never   Vaping Use    Vaping status: Never Used   Substance and Sexual Activity    Alcohol use: No     Alcohol/week: 0.0 oz     Comment:                                  pt states she smoke about 4 cigs per day    Drug use: Yes     Types: IV, Marijuana     Comment: quit 06    Sexual activity: Not Currently     Birth control/protection: Post-Menopausal       CURRENT MEDICATIONS  Home Medications       Reviewed by Hollie Villar (Pharmacy Tech) on 25 at 1213  Med List Status: Complete     Medication Last Dose Status   ALPRAZolam (XANAX) 1 MG Tab Unknown Active   citalopram (CELEXA) 10 MG tablet Unknown Active   prazosin (MINIPRESS) 1 MG Cap Unknown Active   zolpidem (AMBIEN) 5 MG Tab Unknown Active                  Audit from Redirected Encounters    **Home medications have not yet been reviewed for this encounter**         ALLERGIES  No Known Allergies    PHYSICAL EXAM  VITAL SIGNS: BP (!) 200/113   Pulse (!) 117   Temp (!) 38.4 °C (101.1 °F) (Bladder)   Resp (!) 50   Wt 45.4 kg (100 lb)   SpO2 92%   BMI 16.14 kg/m²    General:  WD thin female, ill-appearing; nonverbal; moans and withdraws to noxious stimuli; V/S as above; febrile, hypertensive, tachycardic, tachypneic  Skin: warm and dry; pale color; no rash  HEENT: Atraumatic; patient wearing a wig; dentures in place; dry tongue; left eye with cataract; right pupil 3 mm, sluggish  Neck: No stridor, soft, supple  Cardiovascular: Tachycardic heart rate and regular rhythm.  No murmurs, rubs, or gallops; pulses 1+ bilaterally radially and DP areas  Lungs: Tachypneic; Clear to auscultation with decreased air movement bilaterally.  No wheezes, rhonchi, or rales.   Abdomen: soft; NTND; no rebound, guarding, or rigidity.  No  organomegaly or pulsatile mass  Extremities: BRANCH x 4  Neurologic: withdraws/moans to sternal rub and blood draw  Psychiatric: unable to assess due to unresponsiveness      EKG/LABS  Results for orders placed or performed during the hospital encounter of 04/17/25   Lactic Acid    Collection Time: 04/17/25 11:20 AM   Result Value Ref Range    Lactic Acid 2.7 (H) 0.5 - 2.0 mmol/L   CBC with Differential    Collection Time: 04/17/25 11:20 AM   Result Value Ref Range    WBC 16.5 (H) 4.8 - 10.8 K/uL    RBC 4.95 4.20 - 5.40 M/uL    Hemoglobin 13.1 12.0 - 16.0 g/dL    Hematocrit 41.8 37.0 - 47.0 %    MCV 84.4 81.4 - 97.8 fL    MCH 26.5 (L) 27.0 - 33.0 pg    MCHC 31.3 (L) 32.2 - 35.5 g/dL    RDW 48.1 35.9 - 50.0 fL    Platelet Count 336 164 - 446 K/uL    MPV 9.4 9.0 - 12.9 fL    Neutrophils-Polys 92.30 (H) 44.00 - 72.00 %    Lymphocytes 4.30 (L) 22.00 - 41.00 %    Monocytes 1.70 0.00 - 13.40 %    Eosinophils 0.00 0.00 - 6.90 %    Basophils 0.00 0.00 - 1.80 %    Nucleated RBC 0.00 0.00 - 0.20 /100 WBC    Neutrophils (Absolute) 15.51 (H) 1.82 - 7.42 K/uL    Lymphs (Absolute) 0.71 (L) 1.00 - 4.80 K/uL    Monos (Absolute) 0.28 0.00 - 0.85 K/uL    Eos (Absolute) 0.00 0.00 - 0.51 K/uL    Baso (Absolute) 0.00 0.00 - 0.12 K/uL    NRBC (Absolute) 0.00 K/uL   Complete Metabolic Panel    Collection Time: 04/17/25 11:20 AM   Result Value Ref Range    Sodium 133 (L) 135 - 145 mmol/L    Potassium 4.2 3.6 - 5.5 mmol/L    Chloride 98 96 - 112 mmol/L    Co2 20 20 - 33 mmol/L    Anion Gap 15.0 7.0 - 16.0    Glucose 153 (H) 65 - 99 mg/dL    Bun 24 (H) 8 - 22 mg/dL    Creatinine 1.20 0.50 - 1.40 mg/dL    Calcium 9.0 8.5 - 10.5 mg/dL    Correct Calcium 9.6 8.5 - 10.5 mg/dL    AST(SGOT) 70 (H) 12 - 45 U/L    ALT(SGPT) 27 2 - 50 U/L    Alkaline Phosphatase 134 (H) 30 - 99 U/L    Total Bilirubin 0.6 0.1 - 1.5 mg/dL    Albumin 3.2 3.2 - 4.9 g/dL    Total Protein 8.4 (H) 6.0 - 8.2 g/dL    Globulin 5.2 (H) 1.9 - 3.5 g/dL    A-G Ratio 0.6 g/dL    ESTIMATED GFR    Collection Time: 04/17/25 11:20 AM   Result Value Ref Range    GFR (CKD-EPI) 48 (A) >60 mL/min/1.73 m 2   DIFFERENTIAL MANUAL    Collection Time: 04/17/25 11:20 AM   Result Value Ref Range    Bands-Stabs 1.70 0.00 - 10.00 %    Manual Diff Status PERFORMED     Comment See Comment    PERIPHERAL SMEAR REVIEW    Collection Time: 04/17/25 11:20 AM   Result Value Ref Range    Peripheral Smear Review see below    PLATELET ESTIMATE    Collection Time: 04/17/25 11:20 AM   Result Value Ref Range    Plt Estimation Normal    MORPHOLOGY    Collection Time: 04/17/25 11:20 AM   Result Value Ref Range    RBC Morphology Present     Toxic Gran Few     Toxic Vacuoles Few    CREATINE KINASE    Collection Time: 04/17/25 11:20 AM   Result Value Ref Range    CPK Total 262 (H) 0 - 154 U/L   proBrain Natriuretic Peptide, NT    Collection Time: 04/17/25 11:20 AM   Result Value Ref Range    NT-proBNP 3877 (H) 0 - 125 pg/mL   TROPONIN    Collection Time: 04/17/25 11:20 AM   Result Value Ref Range    Troponin T 142 (H) 6 - 19 ng/L   POCT glucose device results    Collection Time: 04/17/25 11:26 AM   Result Value Ref Range    POC Glucose, Blood 140 (H) 65 - 99 mg/dL   POCT arterial blood gas device results    Collection Time: 04/17/25 12:06 PM   Result Value Ref Range    Ph 7.408 7.350 - 7.450    Pco2 40.7 32.0 - 48.0 mmHg    Po2 73 (L) 83 - 108 mmHg    Tco2 27 (L) 32 - 48 mmol/L    S02 95 93 - 99 %    Hco3 25.7 21.0 - 28.0 mmol/L    BE 1 -4 - 3 mmol/L    Body Temp 101.8 F degrees    O2 Therapy 100 %    iPF Ratio 73     Ph Temp Idris 7.382 7.350 - 7.450    Pco2 Temp Co 44.0 32.0 - 48.0 mmHg    Po2 Temp Cor 83 83 - 108 mmHg    Specimen Arterial     DelSys Vent     Tidal Volume 370 mL    Peep End Expiratory Pressure 10 cmh20    Set Rate 24     Mode APV-CMV    POCT lactate device results    Collection Time: 04/17/25 12:06 PM   Result Value Ref Range    iStat Lactate 2.2 (H) 0.5 - 2.0 mmol/L   Urinalysis    Collection Time:  25 12:25 PM    Specimen: Urine   Result Value Ref Range    Color Dark Yellow     Character Cloudy (A)     Specific Gravity 1.023 <1.035    Ph 5.5 5.0 - 8.0    Glucose Negative Negative mg/dL    Ketones Negative Negative mg/dL    Protein 300 (A) Negative mg/dL    Bilirubin Negative Negative    Urobilinogen, Urine 1.0 <=1.0 EU/dL    Nitrite Negative Negative    Leukocyte Esterase Negative Negative    Occult Blood Small (A) Negative    Micro Urine Req Microscopic    URINE MICROSCOPIC (W/UA)    Collection Time: 25 12:25 PM   Result Value Ref Range    WBC 3-5 /hpf    RBC 0-2 0 - 2 /hpf    Bacteria None Seen None /hpf    Epithelial Cells 0-2 0 - 5 /hpf    Mucous Threads Present /hpf    Urine Casts 3-5 (A) 0 - 2 /lpf    Hyaline Cast Present /lpf    Granular Casts Present (A) Absent /lpf   POC CoV-2, FLU A/B, RSV by PCR    Collection Time: 25 12:42 PM   Result Value Ref Range    POC Influenza A RNA, PCR Negative Negative    POC Influenza B RNA, PCR Negative Negative    POC RSV, by PCR Negative Negative    POC SARS-CoV-2, PCR NotDetected NotDetected   EKG    Collection Time: 25 12:52 PM   Result Value Ref Range    Report       [facility] Emergency Dept.    Test Date:  2025  Pt Name:    AYALA CALI                Department: ER  MRN:        [id number]                      Room:       [facility]  Gender:     Female                       Technician:  :        [id number]                   Requested By:ORIN JORGE  Order #:    [id number]                    Reading MD: ORIN JORGE MD    Measurements  Intervals                                Axis  Rate:       94                           P:          83  PA:         151                          QRS:        127  QRSD:       116                          T:          16  QT:         334  QTc:        418    Interpretive Statements  Sinus rhythm  Atrial premature complex  Left posterior fascicular block  Low voltage with right axis  deviation  Compared to ECG 08/29/2020 11:07:36  Atrial premature complex(es) now present  Left posterior fascicular block now present  Right-axis deviation now present  Electronically Signed On 04- 12:52: 54 PDT by ORIN JORGE MD         I have independently interpreted this EKG which shows no acute ST changes    RADIOLOGY/PROCEDURES   I have independently interpreted the diagnostic imaging associated with this visit and am waiting the final reading from the radiologist.   My preliminary interpretation is as follows:   RLL consolidation on CXR  No ICH on CT head    Radiologist interpretation:  CT-HEAD W/O   Final Result      No acute intracranial abnormality.         A new left parietal lytic calvarial lesion concerning for malignancy.                        DX-CHEST-PORTABLE (1 VIEW)   Final Result      1.  Mild interstitial opacities could be related to edema or infection.   2.  Asymmetric right basilar opacity could be atelectasis or pneumonitis.   3.  Trace/small right pleural effusion.   4.  NG tube tip is obscured but probably at the gastroesophageal junction. Recommend advancement.          COURSE & MEDICAL DECISION MAKING    ASSESSMENT, COURSE AND PLAN  Care Narrative: This is a 74-year-old female who was brought in from home after being found unresponsive by a friend at 10:30 AM.  Last seen at 18:30 last pm.  Patient responded to Narcan in the field.  Patient is currenlty on 15 L via nonrebreather.  She is minimally responsive to pain.  I considered pulmonary edema, PE, ICH, pneumonia, UTI, sepsis, dehydration, electrolyte abnormality, metabolic encephalopathy.    Narcan 2 mg IV ordered.  No change in mentation noted.    RT was paged.  We placed high flow O2 initially which brought her up to 96%.  ABG requested.  However, she remained altered and I decided to intubate her for respiratory failure and airway protection.       Rocephin ordered for possible sepsis/unknown source.  Blood and urine  cultures drawn along with lactic acid and other labs.  Lasix ordered for possible fluid overload.      EKG shows no acute ST changes.    Labs reveal leukocytosis of 16.5 with neutrophilia and lymphopenia, sodium 133, glucose 153, BUN 24, AST 70, alk phos 134, , troponin 142, BNP 3877, and lactic acid to 2.7.  UA negative.    2:25 PM  I discussed the case with the intensivist who agrees to admit the patient.    Chest x-ray reviewed.  Right lower lobe infiltrate noted.    Intubation Procedure Note    Indication: Respiratory failure, hypoxia, and airway protection    Consent: Unable to be obtained due to the emergent nature of this procedure.    Medications Used: propofol 50 mg intravenously and rocuronium 50 mg intravenously    Procedure: The patient was placed in the appropriate position.  Cricoid pressure was not required.  Intubation was performed by video laryngoscopy, using a glidescope a 7.5 cuffed endotracheal tube.  The cuff was then inflated and the tube was secured appropriately at a distance of 23 cm to the dental ridge.  Initial confirmation of placement included bilateral breath sounds, an end tidal CO2 detector, absence of sounds over the stomach, tube fogging, adequate chest rise, and adequate pulse oximetry reading.  A chest x-ray to verify correct placement of the tube has been ordered but is still pending.    The patient tolerated the procedure well.     Complications: Direct laryngoscopy was initially attempted; Glide scope used on second attempt      Sepsis: Infection was suspected 11:30 AM (Time). Sepsis pathway was initiated. Fluids not needed (no hypotension or lactate greater than or equal to 4). Antibiotics were given per protocol.    CRITICAL CARE  The very real possibilty of a deterioration of this patient's condition required the highest level of my preparedness for sudden, emergent intervention.  I provided critical care services, which included medication orders, frequent  reevaluations of the patient's condition and response to treatment, ordering and reviewing test results, and discussing the case with the intensivist.  The critical care time associated with the care of the patient was 35 minutes. Review chart for interventions. This time is exclusive of any other billable procedures.           DISPOSITION AND DISCUSSIONS  I have discussed management of the patient with the following physicians and EFRAIN's:    Gonda    Discussion of management with other QHP or appropriate source(s): Pharmacy for abx and RSI medications and RT for airway management      Decision tools and prescription drugs considered including, but not limited to: Antibiotics for unknown infection .    FINAL DIAGNOSIS  1. Altered mental status, unspecified altered mental status type    2. Fever, unspecified fever cause    3. Sepsis, due to unspecified organism, unspecified whether acute organ dysfunction present (HCC)         Electronically signed by: Camryn Clark M.D., 4/17/2025 11:55 AM

## 2025-04-17 NOTE — CONSULTS
Critical Care/Pulmonary Consultation    Date of Service: 4/17/2025    Date of Admission:  4/17/2025 11:14 AM    Consulting Physician: Jeremy M Gonda, M.D.    Chief Complaint:  Drug Overdose and ALOC      History of Present Illness:  Manisha Rincon is a 74 y.o. female who is admitted to ICU on April 17, 2025 due to she is found on the floor of the house by her friends with bottle of methadone and antibiotics.  Her last known normal time was yesterday at 10:30 PM.  Due to acute respiratory failure, patient is intubated in the ED with sedation, she is not responding to voice, she cannot provide any history.  Per chart review and the patient's home medication, it seems patient is not taking medication for chronic conditions except psychology disease such as an anxiety or PTSD.  Per care everywhere, patient haydee has a hospital encounter between August 12 to August 30, 2024 in [facility] due to acute respiratory failure, YUMIKO, anemia, hematoma, pulmonary embolism, heart failure with reduced ejection fraction, encephalopathy possible contributor from cefepime, and infected left hip prosthetic.  Today in the ED, she arrived with heart rate 105, respiratory rate 59, blood pressure 209/106, initial need 15 L oxygen nonrebreather mask then needs intubation;   Labs :  WBC 16.5, neutrophil 92.3%; T max 101.1F; lactic acid 2.7->1.8->3.6, [place] 4.0, flu COVID and RSV negative, pending blood culture  Hemoglobin 13.1, , INR 1.31  Sodium 133, potassium 4.2, bicarb 20, anion gap 15, BUN 24, creatinine 1.2, GFR 48, , urinalysis showing protein and granular casts  Trop 142, NT proBNP 3877  Imaging: Chest x-ray suspected edema or pneumonia; CT brain is negative for bleeding, but has a new left parietal lytic calvarial lesion concerning for malignancy.   Patient is therefore admitted to ICU due to acute respiratory failure needing ventilation.  142        Review of Systems   Unable to perform ROS:  Acuity of condition       Home Medications       Reviewed by Hollie Villar (Pharmacy Tech) on 25 at 1213  Med List Status: Complete     Medication Last Dose Status   ALPRAZolam (XANAX) 1 MG Tab Unknown Active   citalopram (CELEXA) 10 MG tablet Unknown Active   prazosin (MINIPRESS) 1 MG Cap Unknown Active   zolpidem (AMBIEN) 5 MG Tab Unknown Active                  Audit from Redirected Encounters    **Home medications have not yet been reviewed for this encounter**         Social History     Tobacco Use    Smoking status: Some Days     Current packs/day: 0.00     Average packs/day: 1 pack/day for 45.0 years (45.0 ttl pk-yrs)     Types: Cigarettes     Start date: 1964     Last attempt to quit: 2009     Years since quittin.0     Passive exposure: Never    Smokeless tobacco: Never   Vaping Use    Vaping status: Never Used   Substance Use Topics    Alcohol use: No     Alcohol/week: 0.0 oz     Comment:                                  pt states she smoke about 4 cigs per day    Drug use: Yes     Types: IV, Marijuana     Comment: quit 06        Past Medical History:   Diagnosis Date    Anxiety     Arthritis     pt states everywhere    Blind left eye     Blindness of one eye     left eye    Blood clotting disorder (HCC)     leg and arm    Blood transfusion     3/2009 with hip surgery    Chronic pain     everywhere    Clotting disorder (HCC)     Dental disorder     upper/lower    Hemorrhagic disorder (HCC)     nose bleeds    HEP C     Hepatitis C     Hypertension     pt states well controlled    Pneumonia 2006    Psychiatric disorder     ANXIETY DISORDER    Screening mammogram     negative       Past Surgical History:   Procedure Laterality Date    PB SECD CLOS SURG WND EXTEN/COMPLIC Left 2024    Procedure: CLOSURE, WOUND, SECONDARY;  Surgeon: Axel Roberto M.D.;  Location: [facility];  Service: Orthopedics    PB REVISE ACETABULAR PART OF  TOTAL HIP Left 7/19/2024    Procedure: LEFT TOTAL HIP ARTHROPLASTY REVISION;  Surgeon: Axel Roberto M.D.;  Location: [facility];  Service: Orthopedics    HIP ARTHROPLASTY TOTAL Left 3/7/2017    Procedure: HIP ARTHROPLASTY TOTAL;  Surgeon: Serafin Fitzgerald M.D.;  Location: [facility];  Service:     CERVICAL DISK AND FUSION ANTERIOR N/A 4/8/2016    Procedure: CERVICAL DISK AND FUSION ANTERIOR C3-5  ;  Surgeon: Dieudonne Hill M.D.;  Location: SURGERY [facility];  Service:     CERVICAL FUSION POSTERIOR N/A 4/8/2016    Procedure: CERVICAL FUSION POSTERIOR OCCIPUT TO C6;  Surgeon: Dieudonne Hill M.D.;  Location: SURGERY [facility];  Service:     OTHER      MULTIPLE BACK, RIGHT HIP    NE BREAST AUGMENTATION WITH IMPLANT      TONSILLECTOMY         Allergies: Patient has no known allergies.    Family History   Problem Relation Age of Onset    Cancer Father         metastatic     Cancer Sister         ? Cancer        Vitals:    04/17/25 1119 04/17/25 1120 04/17/25 1121 04/17/25 1144   Height:       Weight:  45.4 kg (100 lb)     Weight % change since last entry.:  0 %     BP: (!) 209/106  (!) 209/106    Pulse: (!) 105  (!) 103 100   Resp: (!) 59  (!) 32    Temp:   36.6 °C (97.9 °F)    TempSrc:   Temporal     04/17/25 1145 04/17/25 1149 04/17/25 1150 04/17/25 1151   Height:       Weight:       Weight % change since last entry.:       BP: (!) 203/106  (!) 184/92 (!) 208/111   Pulse: (!) 102 92 92 (!) 109   Resp:  16     Temp:       TempSrc:        04/17/25 1151 04/17/25 1152 04/17/25 1153 04/17/25 1210   Height:       Weight:       Weight % change since last entry.:       BP: (!) 184/92 (!) 208/111 (!) 225/124    Pulse: (!) 109 (!) 111 (!) 111 (!) 110   Resp: 17 17  (!) 41   Temp:       TempSrc:        04/17/25 1221 04/17/25 1225 04/17/25 1229 04/17/25 1241   Height:       Weight:       Weight % change since last entry.:       BP:  (!) 216/113 (!) 206/104 (!) 213/116   Pulse:  (!)  "121 (!) 121 (!) 120   Resp:    (!) 24   Temp: (!) 38.4 °C (101.1 °F)      TempSrc: Bladder       04/17/25 1246 04/17/25 1251 04/17/25 1306 04/17/25 1331   Height:       Weight:       Weight % change since last entry.:       BP: (!) 212/110 (!) 205/104 (!) 200/113 (!) 206/111   Pulse: (!) 119 (!) 119 (!) 117 (!) 114   Resp: (!) 24 (!) 24 (!) 50 (!) 23   Temp:       TempSrc:        04/17/25 1341 04/17/25 1346 04/17/25 1351 04/17/25 1356   Height:       Weight:       Weight % change since last entry.:       BP: (!) 187/110 (!) 187/109 (!) 189/111 (!) 157/93   Pulse: (!) 113 (!) 113 (!) 112 (!) 105   Resp: (!) 26 (!) 40 (!) 32 (!) 25   Temp:       TempSrc:        04/17/25 1401 04/17/25 1406 04/17/25 1411 04/17/25 1426   Height:       Weight:       Weight % change since last entry.:       BP: (!) 152/86 (!) 146/86 (!) 140/84 139/86   Pulse: (!) 102 100 98 97   Resp: (!) 30 (!) 23 (!) 24 (!) 25   Temp:       TempSrc:        04/17/25 1431 04/17/25 1437 04/17/25 1441 04/17/25 1500   Height:  1.676 m (5' 5.98\")  1.676 m (5' 6\")   Weight:       Weight % change since last entry.:       BP: (!) 159/101  (!) 164/103    Pulse: (!) 104 (!) 105 (!) 105    Resp: (!) 24 (!) 26 (!) 23    Temp:       TempSrc:        04/17/25 1530 04/17/25 1600 04/17/25 1630 04/17/25 1638   Height:       Weight: 50.2 kg (110 lb 10.7 oz)      Weight % change since last entry.: 10.67 %      BP: (!) 141/89 108/73 123/82 123/82   Pulse: 87 83 75 73   Resp: (!) 34 (!) 24 (!) 24 (!) 24   Temp: 36.7 °C (98 °F)      TempSrc: Temporal       04/17/25 1700   Height:    Weight:    Weight % change since last entry.:    BP: 126/77   Pulse: 72   Resp: (!) 24   Temp:    TempSrc:        Physical Examination  Physical Exam  Constitutional:       Comments: Patient is an intubation, looks malnutrition, BMI only 17.8   HENT:      Head: Normocephalic.   Cardiovascular:      Rate and Rhythm: Regular rhythm. Tachycardia present.   Pulmonary:      Comments: She is on intubation " with ventilation support  Abdominal:      Palpations: Abdomen is soft.      Comments: Lower abdomen has masslike palpation   Musculoskeletal:         General: No swelling.      Right lower leg: No edema.      Left lower leg: No edema.   Skin:     General: Skin is dry.      Capillary Refill: Capillary refill takes more than 3 seconds.   Neurological:      Comments: She is sedated, not responding to words or voice            Intake/Output Summary (Last 24 hours) at 4/17/2025 1531  Last data filed at 4/17/2025 1254  Gross per 24 hour   Intake --   Output 200 ml   Net -200 ml       Recent Labs     04/17/25  1120   WBC 16.5*   NEUTSPOLYS 92.30*   LYMPHOCYTES 4.30*   MONOCYTES 1.70   EOSINOPHILS 0.00   BASOPHILS 0.00   ASTSGOT 70*   ALTSGPT 27   ALKPHOSPHAT 134*   TBILIRUBIN 0.6     Recent Labs     04/17/25  1120   SODIUM 133*   POTASSIUM 4.2   CHLORIDE 98   CO2 20   BUN 24*   CREATININE 1.20   CALCIUM 9.0     Recent Labs     04/17/25  1120   ALTSGPT 27   ASTSGOT 70*   ALKPHOSPHAT 134*   TBILIRUBIN 0.6   GLUCOSE 153*     Recent Labs     04/17/25  1206   ISTATAPH 7.408   ISTATAPCO2 40.7   ISTATAPO2 73*   ISTATATCO2 27*   GRPCCLV3LGR 95   ISTATARTHCO3 25.7   ISTATARTBE 1   ISTATTEMP 101.8 F   ISTATFIO2 100   ISTATSPEC Arterial   ISTATAPHTC 7.382   GHHXMXDK6SG 83     DX-ABDOMEN FOR TUBE PLACEMENT   Final Result      NG tube tip projects within the stomach.      CT-HEAD W/O   Final Result      No acute intracranial abnormality.         A new left parietal lytic calvarial lesion concerning for malignancy.                        DX-CHEST-PORTABLE (1 VIEW)   Final Result      1.  Mild interstitial opacities could be related to edema or infection.   2.  Asymmetric right basilar opacity could be atelectasis or pneumonitis.   3.  Trace/small right pleural effusion.   4.  NG tube tip is obscured but probably at the gastroesophageal junction. Recommend advancement.      EC-ECHOCARDIOGRAM COMPLETE W/O CONT    (Results Pending)    CT-ABDOMEN-PELVIS WITH    (Results Pending)   CT-CTA CHEST PULMONARY ARTERY W/ RECONS    (Results Pending)       Patient Active Problem List   Diagnosis    Chronic neck pain    Pain due to hip joint prosthesis (HCC)    Hep C w/ coma, chronic    Anxiety    Neuromyopathy (HCC)    Gait instability    Tobacco abuse    Vitamin d deficiency    Hypoxemia    Chronic pain    Blind left eye    Pain medication agreement discussed    Diuretic-induced hypokalemia    Burn    Calcium blood increased    Humpback    Cervical stenosis of spine    PNA (pneumonia)    Hypotension    Pneumonia    Dehydration    Macrocytic anemia    Thrombocytopenia (HCC)    Fall    Left hip pain    Other and unspecified coagulation defects (CMS-HCC) [D68.9]    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)    Necrosis of surgical wound (HCC)    Respiratory failure with hypoxia (HCC)    AMS (altered mental status)    Sepsis (HCC)    Elevated troponin    Elevated brain natriuretic peptide (BNP) level    Malnutrition (HCC)    Stage 3 chronic kidney disease    PTSD (post-traumatic stress disorder)       Assessment and Plan:  * Respiratory failure with hypoxia (HCC)- (present on admission)  Assessment & Plan  Etiology unknown, differential diagnosis could be drug overdose(patient is found with methadone bottle), pneumonia(chest x-ray), pulmonary embolism (medical records from [facility], not sure if patient needs taking anticoagulation medication daily), aspiration, COPD exacerbation(no wheezing heard)or cardiac etiology(elevated proBNP and trop)  Patient has been ventilated.  - Admit to ICU  - RT protocol  - Continue ventilation support  - Empirical antibiotic treatment for pneumonia  - Pending echo and CT PE study  - Urine drug screen      Sepsis (HCC)  Assessment & Plan  This is Sepsis Present on admission  SIRS criteria identified on my evaluation include: Tachycardia, with heart rate greater than 90 BPM, Tachypnea, with  respirations greater than 20 per minute, and Leukocytosis, with WBC greater than 12,000  Clinical indicators of end organ dysfunction include Lactic Acid greater than 2, Toxic Metabolic Encephalopathy, and Acute Respiratory Failure - (mechanical ventilation or BiPap or PaO2/FiO2 ratio < 300)  Source is pneumonia   Sepsis protocol initiated  Crystalloid Fluid Administration: Fluid resuscitation ordered per standard protocol - 30 mL/kg per current or ideal body weight  IV antibiotics as appropriate for source of sepsis  Reassessment: I have reassessed the patient's hemodynamic status    Patient is on sepsis but not sepsis shock   - Pending CT/PE/Abodmen/Pelvis for further evaluation   - Empirical antibiotic treatment with ceftriaxone and azithromycin  - Trending lactate   - Trending CBC  - Following blood culture    AMS (altered mental status)  Assessment & Plan  Patient is found with minimal responsive today, last normal time yesterday 10:30 PM, she has acute change of mental status, differential could be to be 2 big categories: Intracranial change such as tumor,  stroke, CT head has a concern for malignancy; extracranial change such as metabolic, intoxicity(history of opioid induced mental status change per outside problem list), medication effects, hypoxia, infection.   - Urine drug screen  - CT PE study  - CT abdomen pelvis with contrast  - Echo  - Trending trop   - Consider brain MRI if no improvement       PTSD (post-traumatic stress disorder)  Assessment & Plan  History of  -Resume home medication     Stage 3 chronic kidney disease  Assessment & Plan  Not sure about baseline, she has a recent YUMIKO around August 2024 in [facility]  Currently GFR 48, creatinine 1.2  Urinalysis showed protein and granular cast  -Strict I/O  -Avoid nephrotoxicity medication  -Trending renal function  -Urine protein and creatinine ratio    Malnutrition (HCC)  Assessment & Plan  BMI only 17.8  -Nutrition consult  when patient is awake  -Case management    Elevated brain natriuretic peptide (BNP) level  Assessment & Plan  NT proBNP 3877  Per chart review, patient has a history of heart failure with reduced ejection fraction in [facility] records from care everywhere  She is not taking diuretics per her home medication record  She is not fluid overload, but the x-ray has suspected of pulmonary edema  -Pending echo  -Lasix 40 mg IV daily    Elevated troponin  Assessment & Plan  Trop 142, sinus rhythm, no acute ischemia change,  patient is not able to provide any history about chest pain  Patient is not fluid overload  -Trending Trop  -Pending echo    DVT prophylaxis: Nadia Cruz M.D.  [facility], PGY-2

## 2025-04-17 NOTE — PROGRESS NOTES
4 Eyes Skin Assessment Completed by TOM Galdamez and TOM De La Torre.    Head Blanching and Redness, line on back of head right lower   Ears WDL  Nose WDL  Mouth WDL  Neck Scab  Breast/Chest Redness and Blanching on left breast from medical device, small scratch on left chest near breast  Shoulder Blades Redness and Blanching  Spine Redness and Blanching  (R) Arm/Elbow/Hand Redness, Blanching, Abrasion, Scab, and Discoloration  (L) Arm/Elbow/Hand Redness, Blanching, Scab, and Discoloration  Abdomen WDL  Groin Redness and Blanching  Scrotum/Coccyx/Buttocks Redness, Non-Blanching, and Excoriation  (R) Leg Scar and Scab, dry, cracking   (L) Leg Scar and Scab, dry, cracking  (R) Heel/Foot/Toe Redness and Blanching, boggy, scattered scabs   (L) Heel/Foot/Toe Redness, Blanching, and Boggy, scattered scabs           Devices In Places ECG, Blood Pressure Cuff, Pulse Ox, Benton, ET Tube, and OG/NG      Interventions In Place Heel Mepilex, Sacral Mepilex, Heel Float Boots, TAP System, Elbow Mepilex, Q2 Turns, Barrier Cream, ZFlo Pillow, and Pressure Redistribution Mattress    Possible Skin Injury Yes    Pictures Uploaded Into Epic Yes  Wound Consult Placed Yes  RN Wound Prevention Protocol Ordered Yes

## 2025-04-17 NOTE — ED NOTES
Pt successfully intubated 7.5 ett secured at 23cm at the teeth. Confirmed placement with auscultation and color change on co2

## 2025-04-18 ENCOUNTER — HOSPITAL ENCOUNTER (OUTPATIENT)
Dept: RADIOLOGY | Facility: MEDICAL CENTER | Age: 75
End: 2025-04-18
Attending: INTERNAL MEDICINE
Payer: MEDICARE

## 2025-04-18 PROBLEM — R91.8 MASS OF RIGHT LUNG: Status: ACTIVE | Noted: 2025-04-18

## 2025-04-18 LAB
ALBUMIN SERPL BCP-MCNC: 2.5 G/DL (ref 3.2–4.9)
ALBUMIN/GLOB SERPL: 0.5 G/DL
ALP SERPL-CCNC: 132 U/L (ref 30–99)
ALT SERPL-CCNC: 60 U/L (ref 2–50)
ANION GAP SERPL CALC-SCNC: 13 MMOL/L (ref 7–16)
AST SERPL-CCNC: 115 U/L (ref 12–45)
B PARAP IS1001 DNA NPH QL NAA+NON-PROBE: NOT DETECTED
B PERT.PT PRMT NPH QL NAA+NON-PROBE: NOT DETECTED
BASE EXCESS BLDA CALC-SCNC: 4 MMOL/L (ref -4–3)
BASOPHILS # BLD AUTO: 0.5 % (ref 0–1.8)
BASOPHILS # BLD: 0.07 K/UL (ref 0–0.12)
BILIRUB SERPL-MCNC: 0.6 MG/DL (ref 0.1–1.5)
BODY TEMPERATURE: ABNORMAL DEGREES
BREATHS SETTING VENT: 24
BUN SERPL-MCNC: 25 MG/DL (ref 8–22)
C PNEUM DNA NPH QL NAA+NON-PROBE: NOT DETECTED
CALCIUM ALBUM COR SERPL-MCNC: 9.3 MG/DL (ref 8.5–10.5)
CALCIUM SERPL-MCNC: 8.1 MG/DL (ref 8.5–10.5)
CHLORIDE SERPL-SCNC: 99 MMOL/L (ref 96–112)
CO2 BLDA-SCNC: 26 MMOL/L (ref 32–48)
CO2 SERPL-SCNC: 20 MMOL/L (ref 20–33)
CREAT SERPL-MCNC: 1.09 MG/DL (ref 0.5–1.4)
CRP SERPL HS-MCNC: 29.4 MG/DL (ref 0–0.75)
DELSYS IDSYS: ABNORMAL
END TIDAL CARBON DIOXIDE IECO2: 22 MMHG
EOSINOPHIL # BLD AUTO: 0.39 K/UL (ref 0–0.51)
EOSINOPHIL NFR BLD: 2.8 % (ref 0–6.9)
ERYTHROCYTE [DISTWIDTH] IN BLOOD BY AUTOMATED COUNT: 46.6 FL (ref 35.9–50)
FLUAV RNA NPH QL NAA+NON-PROBE: NOT DETECTED
FLUBV RNA NPH QL NAA+NON-PROBE: NOT DETECTED
GFR SERPLBLD CREATININE-BSD FMLA CKD-EPI: 54 ML/MIN/1.73 M 2
GLOBULIN SER CALC-MCNC: 5.2 G/DL (ref 1.9–3.5)
GLUCOSE SERPL-MCNC: 129 MG/DL (ref 65–99)
HADV DNA NPH QL NAA+NON-PROBE: NOT DETECTED
HCO3 BLDA-SCNC: 25.5 MMOL/L (ref 21–28)
HCOV 229E RNA NPH QL NAA+NON-PROBE: NOT DETECTED
HCOV HKU1 RNA NPH QL NAA+NON-PROBE: NOT DETECTED
HCOV NL63 RNA NPH QL NAA+NON-PROBE: NOT DETECTED
HCOV OC43 RNA NPH QL NAA+NON-PROBE: NOT DETECTED
HCT VFR BLD AUTO: 41.4 % (ref 37–47)
HGB BLD-MCNC: 13.7 G/DL (ref 12–16)
HMPV RNA NPH QL NAA+NON-PROBE: NOT DETECTED
HOROWITZ INDEX BLDA+IHG-RTO: 113 MM[HG]
HPIV1 RNA NPH QL NAA+NON-PROBE: NOT DETECTED
HPIV2 RNA NPH QL NAA+NON-PROBE: NOT DETECTED
HPIV3 RNA NPH QL NAA+NON-PROBE: NOT DETECTED
HPIV4 RNA NPH QL NAA+NON-PROBE: NOT DETECTED
IMM GRANULOCYTES # BLD AUTO: 0.24 K/UL (ref 0–0.11)
IMM GRANULOCYTES NFR BLD AUTO: 1.7 % (ref 0–0.9)
LACTATE BLD-SCNC: 1.8 MMOL/L (ref 0.5–2)
LACTATE SERPL-SCNC: 2.5 MMOL/L (ref 0.5–2)
LACTATE SERPL-SCNC: 2.7 MMOL/L (ref 0.5–2)
LYMPHOCYTES # BLD AUTO: 1.09 K/UL (ref 1–4.8)
LYMPHOCYTES NFR BLD: 7.8 % (ref 22–41)
M PNEUMO DNA NPH QL NAA+NON-PROBE: NOT DETECTED
MAGNESIUM SERPL-MCNC: 1.7 MG/DL (ref 1.5–2.5)
MCH RBC QN AUTO: 27 PG (ref 27–33)
MCHC RBC AUTO-ENTMCNC: 33.1 G/DL (ref 32.2–35.5)
MCV RBC AUTO: 81.7 FL (ref 81.4–97.8)
MODE IMODE: ABNORMAL
MONOCYTES # BLD AUTO: 0.71 K/UL (ref 0–0.85)
MONOCYTES NFR BLD AUTO: 5.1 % (ref 0–13.4)
NEUTROPHILS # BLD AUTO: 11.48 K/UL (ref 1.82–7.42)
NEUTROPHILS NFR BLD: 82.1 % (ref 44–72)
NRBC # BLD AUTO: 0 K/UL
NRBC BLD-RTO: 0 /100 WBC (ref 0–0.2)
O2/TOTAL GAS SETTING VFR VENT: 60 %
PCO2 BLDA: 30.3 MMHG (ref 32–48)
PCO2 TEMP ADJ BLDA: 29.7 MMHG (ref 32–48)
PEEP END EXPIRATORY PRESSURE IPEEP: 10 CMH20
PH BLDA: 7.53 [PH] (ref 7.35–7.45)
PH TEMP ADJ BLDA: 7.54 [PH] (ref 7.35–7.45)
PHOSPHATE SERPL-MCNC: 2.6 MG/DL (ref 2.5–4.5)
PLATELET # BLD AUTO: 329 K/UL (ref 164–446)
PMV BLD AUTO: 9.8 FL (ref 9–12.9)
PO2 BLDA: 68 MMHG (ref 83–108)
PO2 TEMP ADJ BLDA: 65 MMHG (ref 83–108)
POTASSIUM SERPL-SCNC: 3.6 MMOL/L (ref 3.6–5.5)
PREALB SERPL-MCNC: 3.8 MG/DL (ref 18–38)
PROT SERPL-MCNC: 7.7 G/DL (ref 6–8.2)
RBC # BLD AUTO: 5.07 M/UL (ref 4.2–5.4)
RSV RNA NPH QL NAA+NON-PROBE: NOT DETECTED
RV+EV RNA NPH QL NAA+NON-PROBE: NOT DETECTED
SAO2 % BLDA: 95 % (ref 93–99)
SARS-COV-2 RNA NPH QL NAA+NON-PROBE: NOTDETECTED
SCCMEC + MECA PNL NOSE NAA+PROBE: NEGATIVE
SODIUM SERPL-SCNC: 132 MMOL/L (ref 135–145)
SPECIMEN DRAWN FROM PATIENT: ABNORMAL
TIDAL VOLUME IVT: 370 ML
WBC # BLD AUTO: 14 K/UL (ref 4.8–10.8)

## 2025-04-18 PROCEDURE — 71045 X-RAY EXAM CHEST 1 VIEW: CPT

## 2025-04-18 PROCEDURE — 700105 HCHG RX REV CODE 258: Performed by: STUDENT IN AN ORGANIZED HEALTH CARE EDUCATION/TRAINING PROGRAM

## 2025-04-18 PROCEDURE — 86140 C-REACTIVE PROTEIN: CPT

## 2025-04-18 PROCEDURE — 94799 UNLISTED PULMONARY SVC/PX: CPT

## 2025-04-18 PROCEDURE — 82803 BLOOD GASES ANY COMBINATION: CPT

## 2025-04-18 PROCEDURE — 84100 ASSAY OF PHOSPHORUS: CPT

## 2025-04-18 PROCEDURE — 36600 WITHDRAWAL OF ARTERIAL BLOOD: CPT

## 2025-04-18 PROCEDURE — 87641 MR-STAPH DNA AMP PROBE: CPT

## 2025-04-18 PROCEDURE — 87899 AGENT NOS ASSAY W/OPTIC: CPT

## 2025-04-18 PROCEDURE — 83605 ASSAY OF LACTIC ACID: CPT

## 2025-04-18 PROCEDURE — 700102 HCHG RX REV CODE 250 W/ 637 OVERRIDE(OP)

## 2025-04-18 PROCEDURE — 87449 NOS EACH ORGANISM AG IA: CPT

## 2025-04-18 PROCEDURE — 80053 COMPREHEN METABOLIC PANEL: CPT

## 2025-04-18 PROCEDURE — 700111 HCHG RX REV CODE 636 W/ 250 OVERRIDE (IP): Performed by: INTERNAL MEDICINE

## 2025-04-18 PROCEDURE — 700101 HCHG RX REV CODE 250: Performed by: STUDENT IN AN ORGANIZED HEALTH CARE EDUCATION/TRAINING PROGRAM

## 2025-04-18 PROCEDURE — 0202U NFCT DS 22 TRGT SARS-COV-2: CPT

## 2025-04-18 PROCEDURE — 700102 HCHG RX REV CODE 250 W/ 637 OVERRIDE(OP): Performed by: STUDENT IN AN ORGANIZED HEALTH CARE EDUCATION/TRAINING PROGRAM

## 2025-04-18 PROCEDURE — A9270 NON-COVERED ITEM OR SERVICE: HCPCS

## 2025-04-18 PROCEDURE — 770022 HCHG ROOM/CARE - ICU (200)

## 2025-04-18 PROCEDURE — 99291 CRITICAL CARE FIRST HOUR: CPT | Performed by: STUDENT IN AN ORGANIZED HEALTH CARE EDUCATION/TRAINING PROGRAM

## 2025-04-18 PROCEDURE — 83735 ASSAY OF MAGNESIUM: CPT

## 2025-04-18 PROCEDURE — 85025 COMPLETE CBC W/AUTO DIFF WBC: CPT

## 2025-04-18 PROCEDURE — 94003 VENT MGMT INPAT SUBQ DAY: CPT

## 2025-04-18 PROCEDURE — 94150 VITAL CAPACITY TEST: CPT

## 2025-04-18 PROCEDURE — A9270 NON-COVERED ITEM OR SERVICE: HCPCS | Performed by: STUDENT IN AN ORGANIZED HEALTH CARE EDUCATION/TRAINING PROGRAM

## 2025-04-18 PROCEDURE — 84134 ASSAY OF PREALBUMIN: CPT

## 2025-04-18 PROCEDURE — 700111 HCHG RX REV CODE 636 W/ 250 OVERRIDE (IP): Mod: JZ | Performed by: STUDENT IN AN ORGANIZED HEALTH CARE EDUCATION/TRAINING PROGRAM

## 2025-04-18 PROCEDURE — 700102 HCHG RX REV CODE 250 W/ 637 OVERRIDE(OP): Performed by: INTERNAL MEDICINE

## 2025-04-18 PROCEDURE — A9270 NON-COVERED ITEM OR SERVICE: HCPCS | Performed by: INTERNAL MEDICINE

## 2025-04-18 PROCEDURE — 3E033XZ INTRODUCTION OF VASOPRESSOR INTO PERIPHERAL VEIN, PERCUTANEOUS APPROACH: ICD-10-PCS | Performed by: STUDENT IN AN ORGANIZED HEALTH CARE EDUCATION/TRAINING PROGRAM

## 2025-04-18 RX ORDER — MAGNESIUM SULFATE HEPTAHYDRATE 40 MG/ML
2 INJECTION, SOLUTION INTRAVENOUS ONCE
Status: COMPLETED | OUTPATIENT
Start: 2025-04-18 | End: 2025-04-18

## 2025-04-18 RX ORDER — ACETAMINOPHEN 325 MG/1
650 TABLET ORAL EVERY 4 HOURS PRN
Status: DISCONTINUED | OUTPATIENT
Start: 2025-04-18 | End: 2025-04-20

## 2025-04-18 RX ORDER — NOREPINEPHRINE BITARTRATE 0.03 MG/ML
0-1 INJECTION, SOLUTION INTRAVENOUS CONTINUOUS
Status: DISCONTINUED | OUTPATIENT
Start: 2025-04-18 | End: 2025-04-19

## 2025-04-18 RX ORDER — DEXMEDETOMIDINE HYDROCHLORIDE 4 UG/ML
.1-1.5 INJECTION, SOLUTION INTRAVENOUS CONTINUOUS
Status: DISCONTINUED | OUTPATIENT
Start: 2025-04-18 | End: 2025-04-19

## 2025-04-18 RX ORDER — SODIUM CHLORIDE, SODIUM LACTATE, POTASSIUM CHLORIDE, AND CALCIUM CHLORIDE .6; .31; .03; .02 G/100ML; G/100ML; G/100ML; G/100ML
500 INJECTION, SOLUTION INTRAVENOUS ONCE
Status: COMPLETED | OUTPATIENT
Start: 2025-04-18 | End: 2025-04-18

## 2025-04-18 RX ADMIN — ACETAMINOPHEN 650 MG: 325 TABLET ORAL at 21:59

## 2025-04-18 RX ADMIN — AMPICILLIN SODIUM, SULBACTAM SODIUM 3 G: 2; 1 INJECTION, POWDER, FOR SOLUTION INTRAMUSCULAR; INTRAVENOUS at 11:35

## 2025-04-18 RX ADMIN — PROPOFOL 15 MCG/KG/MIN: 10 INJECTION, EMULSION INTRAVENOUS at 05:25

## 2025-04-18 RX ADMIN — FENTANYL CITRATE 100 MCG: 50 INJECTION, SOLUTION INTRAMUSCULAR; INTRAVENOUS at 11:38

## 2025-04-18 RX ADMIN — SODIUM CHLORIDE, POTASSIUM CHLORIDE, SODIUM LACTATE AND CALCIUM CHLORIDE 500 ML: 600; 310; 30; 20 INJECTION, SOLUTION INTRAVENOUS at 13:37

## 2025-04-18 RX ADMIN — Medication 1 APPLICATOR: at 05:05

## 2025-04-18 RX ADMIN — DEXMEDETOMIDINE 0.2 MCG/KG/HR: 100 INJECTION, SOLUTION INTRAVENOUS at 20:45

## 2025-04-18 RX ADMIN — Medication 1 APPLICATOR: at 17:05

## 2025-04-18 RX ADMIN — SENNOSIDES AND DOCUSATE SODIUM 2 TABLET: 50; 8.6 TABLET ORAL at 17:05

## 2025-04-18 RX ADMIN — AMPICILLIN SODIUM, SULBACTAM SODIUM 3 G: 2; 1 INJECTION, POWDER, FOR SOLUTION INTRAMUSCULAR; INTRAVENOUS at 17:08

## 2025-04-18 RX ADMIN — MAGNESIUM SULFATE HEPTAHYDRATE 2 G: 2 INJECTION, SOLUTION INTRAVENOUS at 10:03

## 2025-04-18 RX ADMIN — AMPICILLIN SODIUM, SULBACTAM SODIUM 3 G: 2; 1 INJECTION, POWDER, FOR SOLUTION INTRAMUSCULAR; INTRAVENOUS at 23:45

## 2025-04-18 RX ADMIN — ENOXAPARIN SODIUM 30 MG: 100 INJECTION SUBCUTANEOUS at 17:05

## 2025-04-18 RX ADMIN — AZITHROMYCIN DIHYDRATE 500 MG: 250 TABLET ORAL at 05:05

## 2025-04-18 RX ADMIN — POTASSIUM BICARBONATE 50 MEQ: 978 TABLET, EFFERVESCENT ORAL at 10:03

## 2025-04-18 RX ADMIN — CITALOPRAM HYDROBROMIDE 10 MG: 20 TABLET ORAL at 05:05

## 2025-04-18 RX ADMIN — FAMOTIDINE 20 MG: 20 TABLET, FILM COATED ORAL at 05:05

## 2025-04-18 RX ADMIN — NOREPINEPHRINE BITARTRATE 0.05 MCG/KG/MIN: 1 INJECTION, SOLUTION, CONCENTRATE INTRAVENOUS at 23:40

## 2025-04-18 ASSESSMENT — PAIN DESCRIPTION - PAIN TYPE
TYPE: ACUTE PAIN

## 2025-04-18 ASSESSMENT — PULMONARY FUNCTION TESTS: FVC: 0.5

## 2025-04-18 ASSESSMENT — FIBROSIS 4 INDEX: FIB4 SCORE: 2.95

## 2025-04-18 NOTE — CARE PLAN
The patient is Watcher - Medium risk of patient condition declining or worsening    Shift Goals  Clinical Goals: improve mentation, CT, trend labs  Patient Goals: ramirez  Family Goals: ramirez    Progress made toward(s) clinical / shift goals:    Problem: Safety - Medical Restraint  Goal: Remains free of injury from restraints (Restraint for Interference with Medical Device)  Outcome: Progressing  Note: Patient assessed frequently for needs. Circulatory status checked and patient repositioned frequently. Restraint charting and necessity of restraints documented Q2 hours.      Problem: Pain - Standard  Goal: Alleviation of pain or a reduction in pain to the patient’s comfort goal  Outcome: Progressing  Note: Pain assessed using the CPOT pain scale and pt medicated per MAR. Pt educated on non-pharmacological interventions including repositioning, distraction and heat/cold packs.

## 2025-04-18 NOTE — ASSESSMENT & PLAN NOTE
Suspect in setting of methadone intoxication/overdose vs PNA  Patient intubated on 4/17   CTA chest w/ patch opacities in RLL   Bcx NGTD, procal elevated, extended viral panel negative  Extubated to NC on AM of 4/19    Plan  Cont empiric PNA treatment with unasyn x5 days and azithromycin x3 days  Wean NC as able  Encourage IS, OOB

## 2025-04-18 NOTE — ASSESSMENT & PLAN NOTE
This is Sepsis Present on admission  SIRS criteria identified on my evaluation include: Tachycardia, with heart rate greater than 90 BPM, Tachypnea, with respirations greater than 20 per minute, and Leukocytosis, with WBC greater than 12,000  Clinical indicators of end organ dysfunction include Lactic Acid greater than 2, Toxic Metabolic Encephalopathy, and Acute Respiratory Failure - (mechanical ventilation or BiPap or PaO2/FiO2 ratio < 300)  Source is pneumonia   Sepsis protocol initiated  Crystalloid Fluid Administration: Fluid resuscitation ordered per standard protocol - 30 mL/kg per current or ideal body weight  IV antibiotics as appropriate for source of sepsis  Reassessment: I have reassessed the patient's hemodynamic status    Patient is on sepsis but not sepsis shock   - Possible PNA on CT chest  - Empirical antibiotic treatment with unasyn and azithromycin  - Trending lactate - cleared  - Trending CBC  - Following blood culture - NGTD

## 2025-04-18 NOTE — CARE PLAN
The patient is Watcher - Medium risk of patient condition declining or worsening    Shift Goals  Clinical Goals: neuro improvement, hemodynamic stability  Patient Goals: KELLEE  Family Goals: KELLEE    Problem: Hemodynamics  Goal: Patient's hemodynamics, fluid balance and neurologic status will be stable or improve  Outcome: Not Met  Note: Fluids given, lactic increasing, BP and HR WDL     Problem: Fall Risk  Goal: Patient will remain free from falls  Outcome: Progressing  Note: Precautions in place     Problem: Safety - Medical Restraint  Goal: Remains free of injury from restraints (Restraint for Interference with Medical Device)  Outcome: Progressing  Note: Q2 checks and ROM, needs assessed     Problem: Pain - Standard  Goal: Alleviation of pain or a reduction in pain to the patient’s comfort goal  Outcome: Progressing  Note: CPOT 0, no pushes given

## 2025-04-18 NOTE — ASSESSMENT & PLAN NOTE
Perihilar mass in R lung and lytic lesion in skull concerning for malignancy  Will need outpatient follow-up with pulmonary for EBUS   I made patient aware of these findings following extubation on 4/19  I have requested outpatient pulmonary follow-up/EBUS

## 2025-04-18 NOTE — ASSESSMENT & PLAN NOTE
Suspect demand ischemia in setting of respiratory failure, possible pna  ECG w/out ischemic changes  Formal echo pending

## 2025-04-18 NOTE — ASSESSMENT & PLAN NOTE
Not sure about baseline, she has a recent YUMIKO around August 2024 in [facility]  Currently GFR 48, creatinine 1.2  Urinalysis showed protein and granular cast  -Strict I/O  -Avoid nephrotoxicity medication  -Trending renal function  -Urine protein and creatinine ratio

## 2025-04-18 NOTE — PROGRESS NOTES
NOC Resident notified for increasing lactic of 2.7, will repeat in 4 hours. VSS. Pt presentation unchanged.

## 2025-04-18 NOTE — ASSESSMENT & PLAN NOTE
NT proBNP 3827  POCUS w/ moderately reduced LV systolic function   -Pending formal echo  -Lasix 40 mg IV daily

## 2025-04-18 NOTE — PROGRESS NOTES
4 Eyes Skin Assessment Completed by TOM Duffy and TOM Little.    Head WDL  Ears WDL  Nose WDL  Mouth WDL  Neck scars  Breast/Chest Redness, Abrasion, and Blanching  Shoulder Blades Redness and Blanching  Spine Redness and Blanching  (R) Arm/Elbow/Hand Redness and Blanching, scars    (L) Arm/Elbow/Hand Redness, Blanching, and Abrasion, discoloration from rings, scars    Abdomen WDL  Groin WDL  Scrotum/Coccyx/Buttocks Redness, Blanching, Non-Blanching, and Excoriation, pta dti  (R) Leg WDL  (L) Leg WDL  (R) Heel/Foot/Toe redness to heel and R pinky toe, blanching, dry    (L) Heel/Foot/Toe Redness and Blanching, dry          Devices In Places ECG, Blood Pressure Cuff, Pulse Ox, Benton, SCD's, ET Tube, and OG/NG      Interventions In Place Heel Mepilex, Sacral Mepilex, Heel Float Boots, TAP System, Pillows, Elbow Mepilex, Q2 Turns, Barrier Cream, ZFlo Pillow, Dri-Leonard Pads, and Pressure Redistribution Mattress    Possible Skin Injury Yes    Pictures Uploaded Into Epic Yes  Wound Consult Placed N/A  RN Wound Prevention Protocol Ordered Yes

## 2025-04-18 NOTE — CARE PLAN
Problem: Ventilation  Goal: Ability to achieve and maintain unassisted ventilation or tolerate decreased levels of ventilator support  Description: Target End Date:  4 days Document on Vent flowsheet1.  Support and monitor invasive and noninvasive mechanical ventilation2.  Monitor ventilator weaning response3.  Perform ventilator associated pneumonia prevention interventions4.  Manage ventilation therapy by monitoring diagnostic test results  Outcome: Not Met      Ventilator Daily Summary    Vent Day #2  Airway: 7.5 @ 24    Ventilator settings:  APVcmv 20 / 370 / +10 /70%

## 2025-04-18 NOTE — CARE PLAN
Problem: Skin Integrity  Goal: Skin integrity is maintained or improved  Outcome: Progressing     Problem: Fall Risk  Goal: Patient will remain free from falls  Outcome: Progressing     Problem: Safety - Medical Restraint  Goal: Remains free of injury from restraints (Restraint for Interference with Medical Device)  Outcome: Progressing     Problem: Pain - Standard  Goal: Alleviation of pain or a reduction in pain to the patient’s comfort goal  Outcome: Progressing

## 2025-04-18 NOTE — HOSPITAL COURSE
"\"In brief, patient is a 75 yo F with a PMHx of anxiety, CHF, anemia, PE cefepime induced encephalopthy who presents with AMS.  She was brought in by EMS from home after she was found down on the floor by a friend with agonal respirations.  EMS noted empty methadone and antibiotic pill bottles next to the patient.  She was also hypoxic.  Her last known well time was at 2230 the previous night.  Her [place] Coma Scale was 6 per EMS and improved to 10 after Narcan.  Upon arrival to the ED, she received a second dose of Narcan without improvement and given hypoxia and altered mental status, was intubated by the emergency physician.  She developed a fever and chest x-ray is concerning for pneumonia.  There was concern initially for possible flash pulmonary edema and she was given a dose of IV Lasix as well as empiric Rocephin and was started on a propofol drip.  At the time of my evaluation, patient is unable to provide any clinical history given her current clinical condition.  On chart review, she has been seen by orthopedics for multiple conditions.\"   Dr Gonda (4/17)       4/18: Patient awake and alert, but lethargic, infectious work-up negative, still on mechanical ventilator   "

## 2025-04-18 NOTE — PROGRESS NOTES
1835: Resident Li to bedside. Notified of cool mottled extremities and increased lactic result. Also notified of neuro assessment.

## 2025-04-18 NOTE — CARE PLAN
Problem: Ventilation  Goal: Ability to achieve and maintain unassisted ventilation or tolerate decreased levels of ventilator support  Description: Target End Date:  4 days Document on Vent flowsheet1.  Support and monitor invasive and noninvasive mechanical ventilation2.  Monitor ventilator weaning response3.  Perform ventilator associated pneumonia prevention interventions4.  Manage ventilation therapy by monitoring diagnostic test results  Outcome: Progressing     Ventilator Daily Summary    Vent Day #2  Airway: 7.5@24    Ventilator settings:20/370/10/60   Weaning trials: yes  Respiratory Procedures:no     Plan: Continue current ventilator settings and wean mechanical ventilation as tolerated per physician orders.

## 2025-04-18 NOTE — PROGRESS NOTES
"Critical Care Progress Note    Date of admission  4/17/2025    Chief Complaint  74 y.o. female admitted 4/17/2025 with encephalopathy, possible overdose    Hospital Course  \"In brief, patient is a 73 yo F with a PMHx of anxiety, CHF, anemia, PE cefepime induced encephalopthy who presents with AMS.  She was brought in by EMS from home after she was found down on the floor by a friend with agonal respirations.  EMS noted empty methadone and antibiotic pill bottles next to the patient.  She was also hypoxic.  Her last known well time was at 2230 the previous night.  Her [place] Coma Scale was 6 per EMS and improved to 10 after Narcan.  Upon arrival to the ED, she received a second dose of Narcan without improvement and given hypoxia and altered mental status, was intubated by the emergency physician.  She developed a fever and chest x-ray is concerning for pneumonia.  There was concern initially for possible flash pulmonary edema and she was given a dose of IV Lasix as well as empiric Rocephin and was started on a propofol drip.  At the time of my evaluation, patient is unable to provide any clinical history given her current clinical condition.  On chart review, she has been seen by orthopedics for multiple conditions.\"   Dr Gonda (4/17)           Interval Problem Update  Reviewed last 24 hour events:              - Acute Overnight events: None   - Tm: Afebrile   - Hr: 75-81   - SBP:    - I/Os: -500 mL   - Vasoactive Drips: NOne   - RASS: -1 Sedation: Propofol Analgesia: fentanyl   - Vent settings and day: APVC 20/370-10-60% SAT/SBT: Yes              - Lines/Tubes: pIVs, harris              - DVT Ppx: Lovenox   - Nutrition: TF, Bowel Reg: PRN, GI PPX: H2A   - Glucose control: In range   - Abx: Ceftriaxone, azithromycin   - Mobility: Up to edge of bed today   - Plan for Today: SAT/SBT, switch to precedex, switch to unasyn     Review of Systems  Review of Systems   Unable to perform ROS: Critical illness    "     Vital Signs for last 24 hours   Temp:  [36.4 °C (97.5 °F)-38.4 °C (101.1 °F)] 36.8 °C (98.2 °F)  Pulse:  [] 80  Resp:  [16-59] 20  BP: ()/() 121/75  SpO2:  [90 %-100 %] 96 %    Hemodynamic parameters for last 24 hours       Respiratory Information for the last 24 hours  Vent Mode: Spont  Rate (breaths/min): 20  Vt Target (mL): 370  PEEP/CPAP: 10  P Support (PS + PEEP): 5  MAP: 12  Control VTE (exp VT): 371    Physical Exam   Physical Exam  Constitutional:       Comments: Cachectic, elderly female resting intubated and sedated on mechanical ventilator   Eyes:      Extraocular Movements: Extraocular movements intact.      Conjunctiva/sclera: Conjunctivae normal.   Cardiovascular:      Rate and Rhythm: Normal rate and regular rhythm.   Pulmonary:      Comments: Coarse ventilatory breath sounds bilaterally, no wheezing appreciated  Musculoskeletal:      Right lower leg: No edema.      Left lower leg: No edema.   Skin:     General: Skin is warm and dry.   Neurological:      Comments: Awake and alert on mechanical ventilator, able to squeeze hands and wiggle toes bilaterally on command         Medications  Current Facility-Administered Medications   Medication Dose Route Frequency Provider Last Rate Last Admin    [Held by provider] furosemide (Lasix) injection 40 mg  40 mg Intravenous DAILY Camryn Clark M.D.   40 mg at 04/17/25 1236    Respiratory Therapy Consult   Nebulization Continuous RT Jeremy M Gonda, M.D.        famotidine (Pepcid) tablet 20 mg  20 mg Enteral Tube DAILY Jeremy M Gonda, M.D.   20 mg at 04/18/25 0505    Or    famotidine (Pepcid) injection 20 mg  20 mg Intravenous DAILY Jeremy M Gonda, M.D.        senna-docusate (Pericolace Or Senokot S) 8.6-50 MG per tablet 2 Tablet  2 Tablet Enteral Tube BID Jeremy M Gonda, M.D.        And    polyethylene glycol/lytes (Miralax) Packet 1 Packet  1 Packet Enteral Tube QDAY PRN Jeremy M Gonda, M.D.        And    magnesium hydroxide (Milk Of  Magnesia) suspension 30 mL  30 mL Enteral Tube QDAY PRN Jeremy M Gonda, M.D.        And    bisacodyl (Dulcolax) suppository 10 mg  10 mg Rectal QDAY PRN Jeremy M Gonda, M.D. MD Alert...ICU Electrolyte Replacement per Pharmacy   Other PHARMACY TO DOSE Jeremy M Gonda, M.D.        lidocaine (Xylocaine) 1 % injection 2 mL  2 mL Tracheal Tube Q30 MIN PRN Jeremy M Gonda, M.D.        Pharmacy Consult: Enteral tube insertion - review meds/change route/product selection  1 Each Other PHARMACY TO DOSE Jeremy M Gonda, M.D.        propofol (DIPRIVAN) injection  0-80 mcg/kg/min Intravenous Continuous Jeremy M Gonda, M.D.   Stopped at 04/18/25 0619    fentaNYL (Sublimaze) injection 50 mcg  50 mcg Intravenous Q HOUR PRN Jeremy M Gonda, M.D.        Or    fentaNYL (Sublimaze) injection 100 mcg  100 mcg Intravenous Q HOUR PRN Jeremy M Gonda, M.D.   100 mcg at 04/17/25 2355    ipratropium-albuterol (DUONEB) nebulizer solution  3 mL Nebulization Q2HRS PRN (RT) Jeremy M Gonda, M.D.        LR (Bolus) infusion 500 mL  500 mL Intravenous Once PRN Jeremy M Gonda, M.D.        cefTRIAXone (Rocephin) syringe 2,000 mg  2,000 mg Intravenous DAILY Jeremy M Gonda, M.D.        azithromycin (Zithromax) tablet 500 mg  500 mg Enteral Tube DAILY Jeremy M Gonda, M.D.   500 mg at 04/18/25 0505    norepinephrine (Levophed) 8 mg in 250 mL NS infusion (premix)  0-1 mcg/kg/min Intravenous Continuous Jeremy M Gonda, M.D.   Held at 04/17/25 1515    enoxaparin (Lovenox) inj 30 mg  30 mg Subcutaneous DAILY AT 1800 Jeremy M Gonda, M.D.   30 mg at 04/17/25 1747    labetalol (Normodyne/Trandate) injection 10 mg  10 mg Intravenous Q4HRS PRN Jeremy M Gonda, M.D.        ondansetron (Zofran) syringe/vial injection 4 mg  4 mg Intravenous Q4HRS PRN Jeremy M Gonda, M.D.        Or    ondansetron (Zofran ODT) dispertab 4 mg  4 mg Enteral Tube Q4HRS PRN Jeremy M Gonda, M.D.        citalopram (CeleXA) tablet 10 mg  10 mg Enteral Tube DAILY Jeremy M Gonda, M.D.   10 mg  at 04/18/25 0505    Nozin nasal  swab  1 Applicator Each Nostril BID Jeremy M Gonda, M.D.   1 Applicator at 04/18/25 0505       Fluids    Intake/Output Summary (Last 24 hours) at 4/18/2025 0656  Last data filed at 4/18/2025 0639  Gross per 24 hour   Intake 1874.32 ml   Output 2390 ml   Net -515.68 ml       Laboratory  Recent Labs     04/17/25  1206 04/18/25  0410   ISTATAPH 7.408 7.533*   ISTATAPCO2 40.7 30.3*   ISTATAPO2 73* 68*   ISTATATCO2 27* 26*   TJAVBPF2OEB 95 95   ISTATARTHCO3 25.7 25.5   ISTATARTBE 1 4*   ISTATTEMP 101.8 F 36.5 C   ISTATFIO2 100 60   ISTATSPEC Arterial Arterial   ISTATAPHTC 7.382 7.540*   QNDIKFVA1YT 83 65*     Recent Labs     04/17/25  1120   CPKTOTAL 262*     Recent Labs     04/17/25  1120 04/18/25  0130   SODIUM 133* 132*   POTASSIUM 4.2 3.6   CHLORIDE 98 99   CO2 20 20   BUN 24* 25*   CREATININE 1.20 1.09   MAGNESIUM  --  1.7   PHOSPHORUS  --  2.6   CALCIUM 9.0 8.1*     Recent Labs     04/17/25  1120 04/18/25  0130   ALTSGPT 27 60*   ASTSGOT 70* 115*   ALKPHOSPHAT 134* 132*   TBILIRUBIN 0.6 0.6   PREALBUMIN  --  3.8*   GLUCOSE 153* 129*     Recent Labs     04/17/25  1120 04/18/25  0130   WBC 16.5* 14.0*   NEUTSPOLYS 92.30* 82.10*   LYMPHOCYTES 4.30* 7.80*   MONOCYTES 1.70 5.10   EOSINOPHILS 0.00 2.80   BASOPHILS 0.00 0.50   ASTSGOT 70* 115*   ALTSGPT 27 60*   ALKPHOSPHAT 134* 132*   TBILIRUBIN 0.6 0.6     Recent Labs     04/17/25  1120 04/18/25  0130   RBC 4.95 5.07   HEMOGLOBIN 13.1 13.7   HEMATOCRIT 41.8 41.4   PLATELETCT 336 329   PROTHROMBTM 16.4*  --    INR 1.31*  --        Imaging  X-Ray:  I have personally reviewed the images and compared with prior images.  EKG:  I have personally reviewed the images and compared with prior images.  CT:    Reviewed    Assessment/Plan  * Respiratory failure with hypoxia (HCC)- (present on admission)  Assessment & Plan  Suspect in setting of methadone intoxication/overdose  Patient has been ventilated.  - Admit to ICU  - RT protocol  -  Continue ventilation support  - Empirical antibiotic treatment for pneumonia  - CTA chest shows R perihilar mass concerning for cancer and patchy opacities in RLL consistent with aspiration pneumonitis/PNA    AMS (altered mental status)  Assessment & Plan  Patient is found with minimal responsive today, last normal time yesterday 10:30 PM  CT head negative  UDS + for meth, fentanyl, methadone, benzos and cannabinoids  Highest suspicion is for intoxication in setting of polysubstance use       Mass of right lung  Assessment & Plan  Perihilar mass in R lung and lytic lesion in skull concerning for malignancy  Will need outpatient follow-up with pulmonary for EBUS     CAP (community acquired pneumonia)  Assessment & Plan  Acute respiratory failure with suspected  chest x-ray change  Procal 4.2   -Empirically treatment with ceftriaxone and azithromycin      PTSD (post-traumatic stress disorder)  Assessment & Plan  History of  -Resume home medication     Stage 3 chronic kidney disease  Assessment & Plan  Not sure about baseline, she has a recent YUMIKO around August 2024 in [facility]  Currently GFR 48, creatinine 1.2  Urinalysis showed protein and granular cast  -Strict I/O  -Avoid nephrotoxicity medication  -Trending renal function  -Urine protein and creatinine ratio    Malnutrition (HCC)  Assessment & Plan  BMI only 17.8  -Nutrition consult when patient is awake  -Case management    Elevated brain natriuretic peptide (BNP) level  Assessment & Plan  NT proBNP 3877  Per chart review, patient has a history of heart failure with reduced ejection fraction in [facility] records from care everywhere  She is not taking diuretics per her home medication record  She is not fluid overload, but the x-ray has suspected of pulmonary edema  POCUS shows global hypokinesia, CO 2.1, CI 1.38  -Pending echo  -Lasix 40 mg IV daily    Elevated troponin  Assessment & Plan  Trop 142, sinus rhythm, no  acute ischemia change,  patient is not able to provide any history about chest pain  Patient is not fluid overload  -Trending Trop  -Pending echo    Sepsis (HCC)  Assessment & Plan  This is Sepsis Present on admission  SIRS criteria identified on my evaluation include: Tachycardia, with heart rate greater than 90 BPM, Tachypnea, with respirations greater than 20 per minute, and Leukocytosis, with WBC greater than 12,000  Clinical indicators of end organ dysfunction include Lactic Acid greater than 2, Toxic Metabolic Encephalopathy, and Acute Respiratory Failure - (mechanical ventilation or BiPap or PaO2/FiO2 ratio < 300)  Source is pneumonia   Sepsis protocol initiated  Crystalloid Fluid Administration: Fluid resuscitation ordered per standard protocol - 30 mL/kg per current or ideal body weight  IV antibiotics as appropriate for source of sepsis  Reassessment: I have reassessed the patient's hemodynamic status    Patient is on sepsis but not sepsis shock   - Possible PNA on CT chest  - Empirical antibiotic treatment with ceftriaxone and azithromycin  - Trending lactate   - Trending CBC  - Following blood culture         VTE:  Heparin  Ulcer: H2 Antagonist  Lines: Benton Catheter  Ongoing indication addressed    I have performed a physical exam and reviewed and updated ROS and Plan today (4/18/2025). In review of yesterday's note (4/17/2025), there are no changes except as documented above.     Discussed patient condition and risk of morbidity and/or mortality with Family, RN, RT, Pharmacy, Charge nurse / hot rounds, and Patient  The patient remains critically ill.  Critical care time = 45 minutes in directly providing and coordinating critical care and extensive data review.  No time overlap and excludes procedures.

## 2025-04-19 ENCOUNTER — APPOINTMENT (OUTPATIENT)
Dept: RADIOLOGY | Facility: MEDICAL CENTER | Age: 75
DRG: 917 | End: 2025-04-19
Payer: MEDICARE

## 2025-04-19 ENCOUNTER — APPOINTMENT (OUTPATIENT)
Dept: RADIOLOGY | Facility: MEDICAL CENTER | Age: 75
DRG: 917 | End: 2025-04-19
Attending: INTERNAL MEDICINE
Payer: MEDICARE

## 2025-04-19 ENCOUNTER — APPOINTMENT (OUTPATIENT)
Dept: RADIOLOGY | Facility: MEDICAL CENTER | Age: 75
DRG: 917 | End: 2025-04-19
Attending: STUDENT IN AN ORGANIZED HEALTH CARE EDUCATION/TRAINING PROGRAM
Payer: MEDICARE

## 2025-04-19 PROBLEM — S36.119A LIVER INJURY: Status: ACTIVE | Noted: 2025-04-19

## 2025-04-19 PROBLEM — Z79.891 CHRONIC PRESCRIPTION OPIATE USE: Status: ACTIVE | Noted: 2025-04-19

## 2025-04-19 LAB
ALBUMIN SERPL BCP-MCNC: 2.3 G/DL (ref 3.2–4.9)
ALBUMIN/GLOB SERPL: 0.5 G/DL
ALP SERPL-CCNC: 123 U/L (ref 30–99)
ALT SERPL-CCNC: 203 U/L (ref 2–50)
ANION GAP SERPL CALC-SCNC: 10 MMOL/L (ref 7–16)
AST SERPL-CCNC: 235 U/L (ref 12–45)
BACTERIA UR CULT: NORMAL
BASE EXCESS BLDA CALC-SCNC: 5 MMOL/L (ref -4–3)
BASOPHILS # BLD AUTO: 0.5 % (ref 0–1.8)
BASOPHILS # BLD: 0.07 K/UL (ref 0–0.12)
BILIRUB SERPL-MCNC: 0.4 MG/DL (ref 0.1–1.5)
BODY TEMPERATURE: ABNORMAL DEGREES
BREATHS SETTING VENT: 20
BUN SERPL-MCNC: 32 MG/DL (ref 8–22)
CALCIUM ALBUM COR SERPL-MCNC: 9.3 MG/DL (ref 8.5–10.5)
CALCIUM SERPL-MCNC: 7.9 MG/DL (ref 8.5–10.5)
CHLORIDE SERPL-SCNC: 101 MMOL/L (ref 96–112)
CO2 BLDA-SCNC: 30 MMOL/L (ref 32–48)
CO2 SERPL-SCNC: 24 MMOL/L (ref 20–33)
CREAT SERPL-MCNC: 0.94 MG/DL (ref 0.5–1.4)
CRP SERPL HS-MCNC: 26 MG/DL (ref 0–0.75)
DELSYS IDSYS: ABNORMAL
END TIDAL CARBON DIOXIDE IECO2: 28 MMHG
EOSINOPHIL # BLD AUTO: 0.04 K/UL (ref 0–0.51)
EOSINOPHIL NFR BLD: 0.3 % (ref 0–6.9)
ERYTHROCYTE [DISTWIDTH] IN BLOOD BY AUTOMATED COUNT: 47.8 FL (ref 35.9–50)
GFR SERPLBLD CREATININE-BSD FMLA CKD-EPI: 64 ML/MIN/1.73 M 2
GLOBULIN SER CALC-MCNC: 4.9 G/DL (ref 1.9–3.5)
GLUCOSE SERPL-MCNC: 112 MG/DL (ref 65–99)
HAV IGM SERPL QL IA: ABNORMAL
HBV CORE IGM SER QL: ABNORMAL
HBV SURFACE AG SER QL: ABNORMAL
HCO3 BLDA-SCNC: 28.6 MMOL/L (ref 21–28)
HCT VFR BLD AUTO: 34.8 % (ref 37–47)
HCV AB SER QL: REACTIVE
HGB BLD-MCNC: 11.2 G/DL (ref 12–16)
HOROWITZ INDEX BLDA+IHG-RTO: 135 MM[HG]
IMM GRANULOCYTES # BLD AUTO: 0.22 K/UL (ref 0–0.11)
IMM GRANULOCYTES NFR BLD AUTO: 1.6 % (ref 0–0.9)
LACTATE BLD-SCNC: 1.5 MMOL/L (ref 0.5–2)
LACTATE SERPL-SCNC: 2 MMOL/L (ref 0.5–2)
LYMPHOCYTES # BLD AUTO: 1.09 K/UL (ref 1–4.8)
LYMPHOCYTES NFR BLD: 7.9 % (ref 22–41)
MAGNESIUM SERPL-MCNC: 2.4 MG/DL (ref 1.5–2.5)
MCH RBC QN AUTO: 26.8 PG (ref 27–33)
MCHC RBC AUTO-ENTMCNC: 32.2 G/DL (ref 32.2–35.5)
MCV RBC AUTO: 83.3 FL (ref 81.4–97.8)
MODE IMODE: ABNORMAL
MONOCYTES # BLD AUTO: 0.94 K/UL (ref 0–0.85)
MONOCYTES NFR BLD AUTO: 6.8 % (ref 0–13.4)
NEUTROPHILS # BLD AUTO: 11.39 K/UL (ref 1.82–7.42)
NEUTROPHILS NFR BLD: 82.9 % (ref 44–72)
NRBC # BLD AUTO: 0 K/UL
NRBC BLD-RTO: 0 /100 WBC (ref 0–0.2)
O2/TOTAL GAS SETTING VFR VENT: 60 %
PCO2 BLDA: 36.9 MMHG (ref 32–48)
PCO2 TEMP ADJ BLDA: 36.5 MMHG (ref 32–48)
PEEP END EXPIRATORY PRESSURE IPEEP: 10 CMH20
PH BLDA: 7.5 [PH] (ref 7.35–7.45)
PH TEMP ADJ BLDA: 7.5 [PH] (ref 7.35–7.45)
PHOSPHATE SERPL-MCNC: 2.5 MG/DL (ref 2.5–4.5)
PLATELET # BLD AUTO: 324 K/UL (ref 164–446)
PMV BLD AUTO: 9.9 FL (ref 9–12.9)
PO2 BLDA: 81 MMHG (ref 83–108)
PO2 TEMP ADJ BLDA: 80 MMHG (ref 83–108)
POTASSIUM SERPL-SCNC: 4.5 MMOL/L (ref 3.6–5.5)
PROT SERPL-MCNC: 7.2 G/DL (ref 6–8.2)
RBC # BLD AUTO: 4.18 M/UL (ref 4.2–5.4)
SAO2 % BLDA: 97 % (ref 93–99)
SIGNIFICANT IND 70042: NORMAL
SITE SITE: NORMAL
SODIUM SERPL-SCNC: 135 MMOL/L (ref 135–145)
SOURCE SOURCE: NORMAL
SPECIMEN DRAWN FROM PATIENT: ABNORMAL
TIDAL VOLUME IVT: 370 ML
WBC # BLD AUTO: 13.8 K/UL (ref 4.8–10.8)

## 2025-04-19 PROCEDURE — 76705 ECHO EXAM OF ABDOMEN: CPT

## 2025-04-19 PROCEDURE — A9270 NON-COVERED ITEM OR SERVICE: HCPCS | Performed by: HOSPITALIST

## 2025-04-19 PROCEDURE — 36600 WITHDRAWAL OF ARTERIAL BLOOD: CPT

## 2025-04-19 PROCEDURE — 700105 HCHG RX REV CODE 258: Performed by: STUDENT IN AN ORGANIZED HEALTH CARE EDUCATION/TRAINING PROGRAM

## 2025-04-19 PROCEDURE — 82803 BLOOD GASES ANY COMBINATION: CPT

## 2025-04-19 PROCEDURE — 83735 ASSAY OF MAGNESIUM: CPT

## 2025-04-19 PROCEDURE — 700102 HCHG RX REV CODE 250 W/ 637 OVERRIDE(OP): Performed by: STUDENT IN AN ORGANIZED HEALTH CARE EDUCATION/TRAINING PROGRAM

## 2025-04-19 PROCEDURE — 87522 HEPATITIS C REVRS TRNSCRPJ: CPT

## 2025-04-19 PROCEDURE — 700111 HCHG RX REV CODE 636 W/ 250 OVERRIDE (IP): Mod: JZ | Performed by: STUDENT IN AN ORGANIZED HEALTH CARE EDUCATION/TRAINING PROGRAM

## 2025-04-19 PROCEDURE — A9270 NON-COVERED ITEM OR SERVICE: HCPCS | Performed by: STUDENT IN AN ORGANIZED HEALTH CARE EDUCATION/TRAINING PROGRAM

## 2025-04-19 PROCEDURE — 700111 HCHG RX REV CODE 636 W/ 250 OVERRIDE (IP): Performed by: INTERNAL MEDICINE

## 2025-04-19 PROCEDURE — 770020 HCHG ROOM/CARE - TELE (206)

## 2025-04-19 PROCEDURE — 94003 VENT MGMT INPAT SUBQ DAY: CPT

## 2025-04-19 PROCEDURE — 3E0G76Z INTRODUCTION OF NUTRITIONAL SUBSTANCE INTO UPPER GI, VIA NATURAL OR ARTIFICIAL OPENING: ICD-10-PCS | Performed by: STUDENT IN AN ORGANIZED HEALTH CARE EDUCATION/TRAINING PROGRAM

## 2025-04-19 PROCEDURE — 94799 UNLISTED PULMONARY SVC/PX: CPT

## 2025-04-19 PROCEDURE — 71045 X-RAY EXAM CHEST 1 VIEW: CPT

## 2025-04-19 PROCEDURE — 80074 ACUTE HEPATITIS PANEL: CPT

## 2025-04-19 PROCEDURE — 700102 HCHG RX REV CODE 250 W/ 637 OVERRIDE(OP): Performed by: INTERNAL MEDICINE

## 2025-04-19 PROCEDURE — 86140 C-REACTIVE PROTEIN: CPT

## 2025-04-19 PROCEDURE — A9270 NON-COVERED ITEM OR SERVICE: HCPCS | Performed by: INTERNAL MEDICINE

## 2025-04-19 PROCEDURE — 700102 HCHG RX REV CODE 250 W/ 637 OVERRIDE(OP): Performed by: HOSPITALIST

## 2025-04-19 PROCEDURE — 80053 COMPREHEN METABOLIC PANEL: CPT

## 2025-04-19 PROCEDURE — 85025 COMPLETE CBC W/AUTO DIFF WBC: CPT

## 2025-04-19 PROCEDURE — 99291 CRITICAL CARE FIRST HOUR: CPT | Performed by: STUDENT IN AN ORGANIZED HEALTH CARE EDUCATION/TRAINING PROGRAM

## 2025-04-19 PROCEDURE — 84100 ASSAY OF PHOSPHORUS: CPT

## 2025-04-19 PROCEDURE — 83605 ASSAY OF LACTIC ACID: CPT

## 2025-04-19 RX ORDER — FUROSEMIDE 10 MG/ML
40 INJECTION INTRAMUSCULAR; INTRAVENOUS DAILY
Status: DISCONTINUED | OUTPATIENT
Start: 2025-04-19 | End: 2025-04-19

## 2025-04-19 RX ORDER — NICOTINE 21 MG/24HR
14 PATCH, TRANSDERMAL 24 HOURS TRANSDERMAL
Status: DISCONTINUED | OUTPATIENT
Start: 2025-04-19 | End: 2025-04-22 | Stop reason: HOSPADM

## 2025-04-19 RX ORDER — METHADONE HYDROCHLORIDE 10 MG/1
20 TABLET ORAL DAILY
Refills: 0 | Status: DISCONTINUED | OUTPATIENT
Start: 2025-04-19 | End: 2025-04-20

## 2025-04-19 RX ORDER — OXYCODONE HYDROCHLORIDE 5 MG/1
5-10 TABLET ORAL EVERY 4 HOURS PRN
Refills: 0 | Status: DISCONTINUED | OUTPATIENT
Start: 2025-04-19 | End: 2025-04-20

## 2025-04-19 RX ORDER — FUROSEMIDE 10 MG/ML
40 INJECTION INTRAMUSCULAR; INTRAVENOUS DAILY
Status: DISCONTINUED | OUTPATIENT
Start: 2025-04-19 | End: 2025-04-22

## 2025-04-19 RX ADMIN — Medication 1 APPLICATOR: at 18:12

## 2025-04-19 RX ADMIN — FAMOTIDINE 20 MG: 20 TABLET, FILM COATED ORAL at 05:17

## 2025-04-19 RX ADMIN — OXYCODONE HYDROCHLORIDE 5 MG: 5 TABLET ORAL at 21:42

## 2025-04-19 RX ADMIN — AZITHROMYCIN DIHYDRATE 500 MG: 250 TABLET ORAL at 05:17

## 2025-04-19 RX ADMIN — CITALOPRAM HYDROBROMIDE 10 MG: 20 TABLET ORAL at 05:18

## 2025-04-19 RX ADMIN — SENNOSIDES AND DOCUSATE SODIUM 2 TABLET: 50; 8.6 TABLET ORAL at 18:10

## 2025-04-19 RX ADMIN — METHADONE HYDROCHLORIDE 20 MG: 10 TABLET ORAL at 11:15

## 2025-04-19 RX ADMIN — NICOTINE 14 MG: 14 PATCH TRANSDERMAL at 09:53

## 2025-04-19 RX ADMIN — ENOXAPARIN SODIUM 30 MG: 100 INJECTION SUBCUTANEOUS at 18:10

## 2025-04-19 RX ADMIN — AMPICILLIN SODIUM, SULBACTAM SODIUM 3 G: 2; 1 INJECTION, POWDER, FOR SOLUTION INTRAMUSCULAR; INTRAVENOUS at 05:26

## 2025-04-19 RX ADMIN — FUROSEMIDE 40 MG: 10 INJECTION, SOLUTION INTRAVENOUS at 09:53

## 2025-04-19 RX ADMIN — AMPICILLIN SODIUM, SULBACTAM SODIUM 3 G: 2; 1 INJECTION, POWDER, FOR SOLUTION INTRAMUSCULAR; INTRAVENOUS at 18:09

## 2025-04-19 RX ADMIN — SENNOSIDES AND DOCUSATE SODIUM 2 TABLET: 50; 8.6 TABLET ORAL at 05:17

## 2025-04-19 RX ADMIN — AMPICILLIN SODIUM, SULBACTAM SODIUM 3 G: 2; 1 INJECTION, POWDER, FOR SOLUTION INTRAMUSCULAR; INTRAVENOUS at 23:17

## 2025-04-19 RX ADMIN — Medication 1 APPLICATOR: at 05:23

## 2025-04-19 RX ADMIN — AMPICILLIN SODIUM, SULBACTAM SODIUM 3 G: 2; 1 INJECTION, POWDER, FOR SOLUTION INTRAMUSCULAR; INTRAVENOUS at 12:14

## 2025-04-19 ASSESSMENT — PAIN DESCRIPTION - PAIN TYPE
TYPE: ACUTE PAIN
TYPE: CHRONIC PAIN
TYPE: ACUTE PAIN
TYPE: ACUTE PAIN
TYPE: CHRONIC PAIN
TYPE: CHRONIC PAIN
TYPE: ACUTE PAIN

## 2025-04-19 ASSESSMENT — COGNITIVE AND FUNCTIONAL STATUS - GENERAL
MOBILITY SCORE: 12
MOVING FROM LYING ON BACK TO SITTING ON SIDE OF FLAT BED: A LOT
WALKING IN HOSPITAL ROOM: A LOT
TURNING FROM BACK TO SIDE WHILE IN FLAT BAD: A LOT
PERSONAL GROOMING: A LOT
STANDING UP FROM CHAIR USING ARMS: A LOT
DRESSING REGULAR UPPER BODY CLOTHING: A LOT
DAILY ACTIVITIY SCORE: 12
TOILETING: A LOT
CLIMB 3 TO 5 STEPS WITH RAILING: A LOT
HELP NEEDED FOR BATHING: A LOT
DRESSING REGULAR LOWER BODY CLOTHING: A LOT
SUGGESTED CMS G CODE MODIFIER MOBILITY: CL
MOVING TO AND FROM BED TO CHAIR: A LOT
SUGGESTED CMS G CODE MODIFIER DAILY ACTIVITY: CL
EATING MEALS: A LOT

## 2025-04-19 ASSESSMENT — ENCOUNTER SYMPTOMS
BACK PAIN: 0
PALPITATIONS: 0
COUGH: 0
VOMITING: 0
HEADACHES: 0
DIARRHEA: 0
MYALGIAS: 1
SHORTNESS OF BREATH: 0
LOSS OF CONSCIOUSNESS: 0
ABDOMINAL PAIN: 0
NAUSEA: 0
DIZZINESS: 0
CHILLS: 0
FEVER: 0

## 2025-04-19 ASSESSMENT — COPD QUESTIONNAIRES
DURING THE PAST 4 WEEKS HOW MUCH DID YOU FEEL SHORT OF BREATH: NONE/LITTLE OF THE TIME
HAVE YOU SMOKED AT LEAST 100 CIGARETTES IN YOUR ENTIRE LIFE: NO/DON'T KNOW
DO YOU EVER COUGH UP ANY MUCUS OR PHLEGM?: NO/ONLY WITH OCCASIONAL COLDS OR INFECTIONS
COPD SCREENING SCORE: 2

## 2025-04-19 ASSESSMENT — FIBROSIS 4 INDEX: FIB4 SCORE: 3.25

## 2025-04-19 NOTE — ASSESSMENT & PLAN NOTE
Pt takes 55mg of methadone daily by her report: unable to confirm on a weekend as the clinic she goes to is closed  Giving 20mg for now   Prn oxycodone

## 2025-04-19 NOTE — ASSESSMENT & PLAN NOTE
Clinically is not in heart failure  Does have findings indicative of pulmonary HTN on CT chest would explain elevated BNP

## 2025-04-19 NOTE — PROGRESS NOTES
4 Eyes Skin Assessment Completed by TOM Duffy and Jim RN.    Head WDL  Ears Redness and Blanching  Nose WDL  Mouth WDL  Neck WDL  Breast/Chest Abrasion    Shoulder Blades Redness and Blanching  Spine Redness and Blanching  (R) Arm/Elbow/Hand Redness and Blanching    (L) Arm/Elbow/Hand Redness, Blanching, and Abrasion    Abdomen WDL  Groin WDL  Scrotum/Coccyx/Buttocks Redness, Blanching, and Excoriation, pta DTI    (R) Leg discoloration  (L) Leg discoloration  (R) Heel/Foot/Toe Redness, Blanching, and Discoloration    (L) Heel/Foot/Toe Redness, Blanching, and Discoloration          Devices In Places ECG, Blood Pressure Cuff, Pulse Ox, Benton, SCD's, ET Tube, and OG/NG      Interventions In Place Heel Mepilex, Sacral Mepilex, Heel Float Boots, TAP System, Pillows, Elbow Mepilex, Q2 Turns, Barrier Cream, ZFlo Pillow, Dri-Leonard Pads, and Pressure Redistribution Mattress    Possible Skin Injury Yes    Pictures Uploaded Into Epic Yes  Wound Consult Placed N/A  RN Wound Prevention Protocol Ordered Yes

## 2025-04-19 NOTE — PROGRESS NOTES
"[facility] Internal Medicine ICU Daily Progress Note    Date of Service  2025    [facility] Team: [facility] ICU Gold Team   Attending: Juan Carlos Salomon M.d.  Senior Resident: Dr. Bhupinder Cruz      Chief Complaint  Manisha Rincon is a 74 y.o. female admitted 2025 with acute respiratory failure    Hospital Course  \"In brief, patient is a 73 yo F with a PMHx of anxiety, CHF, anemia, PE cefepime induced encephalopthy who presents with AMS.  She was brought in by EMS from home after she was found down on the floor by a friend with agonal respirations.  EMS noted empty methadone and antibiotic pill bottles next to the patient.  She was also hypoxic.  Her last known well time was at 2230 the previous night.  Her [place] Coma Scale was 6 per EMS and improved to 10 after Narcan.  Upon arrival to the ED, she received a second dose of Narcan without improvement and given hypoxia and altered mental status, was intubated by the emergency physician.  She developed a fever and chest x-ray is concerning for pneumonia.  There was concern initially for possible flash pulmonary edema and she was given a dose of IV Lasix as well as empiric Rocephin and was started on a propofol drip.  At the time of my evaluation, patient is unable to provide any clinical history given her current clinical condition.  On chart review, she has been seen by orthopedics for multiple conditions.\"   Dr Gonda ()       : Patient awake and alert, but lethargic, infectious work-up negative, still on mechanical ventilator     Interval Problem Update  Neuro: Awake after extubation, Aox4  CV: HR 50-70s, [id number]/50-70s, SR  LunL NC,X-ray shows stable patchy right midlung infiltrates.   GI: on tube feeding, , , , normal total bilirubin, patient complaining of RUQ abdominal pain,  ultrasound negative  Renal: BUN 32, creatinine 0.9, GFR 64, potassium 4.5, bicarb 24, anion gap 10, in the past 24 hours urine output 627, total I/O +300 since " admission  ID: WBC 13.8, patient is afebrile, blood cultures so far negative, patient is on Unasyn  Hemo: Hemoglobin 11.2,       Consultants/Specialty  NA    Code Status  Full Code    Disposition  The patient is not medically cleared for discharge to home or a post-acute facility.    Patient will be transferred to [facility] today      Review of Systems  Review of Systems   Unable to perform ROS: Acuity of condition        Physical Exam  Temp:  [36.7 °C (98.1 °F)-37.8 °C (100 °F)] 37 °C (98.6 °F)  Pulse:  [53-83] 74  Resp:  [16-33] 17  BP: ()/(50-91) 134/77  SpO2:  [90 %-99 %] 92 %    Physical Exam  Constitutional:       General: She is in acute distress.      Appearance: She is ill-appearing.   HENT:      Head: Normocephalic.   Cardiovascular:      Rate and Rhythm: Normal rate and regular rhythm.   Pulmonary:      Effort: Pulmonary effort is normal. No respiratory distress.   Abdominal:      General: Abdomen is flat. There is no distension.      Tenderness: There is abdominal tenderness.      Comments: RQU tenderness   Musculoskeletal:         General: No swelling.   Skin:     General: Skin is warm.      Coloration: Skin is not jaundiced.   Neurological:      Mental Status: She is alert.         Fluids    Intake/Output Summary (Last 24 hours) at 4/19/2025 1257  Last data filed at 4/19/2025 0818  Gross per 24 hour   Intake 1311.49 ml   Output 562 ml   Net 749.49 ml       Laboratory  Recent Labs     04/17/25  1120 04/18/25  0130 04/19/25  0440   WBC 16.5* 14.0* 13.8*   RBC 4.95 5.07 4.18*   HEMOGLOBIN 13.1 13.7 11.2*   HEMATOCRIT 41.8 41.4 34.8*   MCV 84.4 81.7 83.3   MCH 26.5* 27.0 26.8*   MCHC 31.3* 33.1 32.2   RDW 48.1 46.6 47.8   PLATELETCT 336 329 324   MPV 9.4 9.8 9.9     Recent Labs     04/17/25  1120 04/18/25  0130 04/19/25  0440   SODIUM 133* 132* 135   POTASSIUM 4.2 3.6 4.5   CHLORIDE 98 99 101   CO2 20 20 24   GLUCOSE 153* 129* 112*   BUN 24* 25* 32*   CREATININE 1.20 1.09 0.94   CALCIUM 9.0 8.1*  7.9*     Recent Labs     04/17/25  1120   INR 1.31*         Recent Labs     04/17/25  1120 04/17/25  2050   TRIGLYCERIDE 204* 170*       Imaging  US-RUQ   Final Result      1.  No acute abnormalities are noted on ultrasound right upper quadrant abdomen.      DX-ABDOMEN FOR TUBE PLACEMENT   Final Result      DHT tip projects over the mid to distal stomach      DX-CHEST-PORTABLE (1 VIEW)   Final Result         1.  Patchy right midlung infiltrates, stable since prior.   2.  Atherosclerosis      DX-CHEST-PORTABLE (1 VIEW)   Final Result         1.  Patchy right midlung infiltrates.      CT-CTA CHEST PULMONARY ARTERY W/ RECONS   Final Result         1.  No pulmonary embolus appreciated.   2.  Enlargement of the main pulmonary artery, consider pulmonary arterial hypertension.   3.  Patchy bilateral pulmonary infiltrates, greatest in the right lung base.   4.  Soft tissue density in the right tevin tracking along the airways, could represent adenopathy or mass, could be further evaluated with bronchoscopy.   5.  Atherosclerosis and atherosclerotic coronary artery disease.      CT-ABDOMEN-PELVIS WITH   Final Result         1.  Hepatomegaly.   2.  Low-density right hepatic lobe lesion, appearance favors hepatic cyst.   3.  Common bile duct dilatation, consider causes of biliary obstruction with additional workup as clinically appropriate.   4.  Atherosclerosis      Note: There is severe metallic artifacts from orthopedic hardware which severely limits diagnostic sensitivity of this study.      DX-ABDOMEN FOR TUBE PLACEMENT   Final Result      NG tube tip projects within the stomach.      CT-HEAD W/O   Final Result      No acute intracranial abnormality.         A new left parietal lytic calvarial lesion concerning for malignancy.                        DX-CHEST-PORTABLE (1 VIEW)   Final Result      1.  Mild interstitial opacities could be related to edema or infection.   2.  Asymmetric right basilar opacity could be  atelectasis or pneumonitis.   3.  Trace/small right pleural effusion.   4.  NG tube tip is obscured but probably at the gastroesophageal junction. Recommend advancement.      EC-ECHOCARDIOGRAM COMPLETE W/O CONT    (Results Pending)        Assessment/Plan  Problem Representation:    * Respiratory failure with hypoxia (HCC)- (present on admission)  Assessment & Plan  Suspect in setting of methadone intoxication/overdose vs PNA  Patient intubated on 4/17   CTA chest w/ patch opacities in RLL   Bcx NGTD, procal elevated, extended viral panel negative  Extubated to NC on AM of 4/19    Plan  Cont empiric PNA treatment with unasyn x5 days and azithromycin x3 days  Wean NC as able  Encourage IS, OOB    Sepsis (HCC)  Assessment & Plan  This is Sepsis Present on admission  SIRS criteria identified on my evaluation include: Tachycardia, with heart rate greater than 90 BPM, Tachypnea, with respirations greater than 20 per minute, and Leukocytosis, with WBC greater than 12,000  Clinical indicators of end organ dysfunction include Lactic Acid greater than 2, Toxic Metabolic Encephalopathy, and Acute Respiratory Failure - (mechanical ventilation or BiPap or PaO2/FiO2 ratio < 300)  Source is pneumonia   Sepsis protocol initiated  Crystalloid Fluid Administration: Fluid resuscitation ordered per standard protocol - 30 mL/kg per current or ideal body weight  IV antibiotics as appropriate for source of sepsis  Reassessment: I have reassessed the patient's hemodynamic status    Patient is on sepsis but not sepsis shock   - Possible PNA on CT chest  - Empirical antibiotic treatment with unasyn and azithromycin  - Trending lactate - cleared  - Trending CBC  - Following blood culture - NGTD    AMS (altered mental status)  Assessment & Plan  Patient found with minimally responsive at home by BF on evening of 4/17 surrounded by empty methadone bottle  Last normal time 4/16 10:30 PM  CT head negative  UDS + for meth, fentanyl, methadone,  benzos and cannabinoids  Highest suspicion is for intoxication in setting of polysubstance use   Patient denies taking methamphetamines or other illicit drugs despite utox  Awake, alert and oriented x3 as of morning of 4/19       Liver injury  Assessment & Plan  Unclear etiology, possibly shock liver  RUQ u/s ordered, acute hep panel ordered  Cont to trend daily     Chronic prescription opiate use  Assessment & Plan  Patient reports taking methadone 55 mg PO daily  Unable to confirm with methadone clinic on weekend,  shows only benzos and zolpidem  Will start 20 mg PO methadone today (4/19) to prevent w/d     Mass of right lung  Assessment & Plan  Perihilar mass in R lung and lytic lesion in skull concerning for malignancy  Will need outpatient follow-up with pulmonary for EBUS   I made patient aware of these findings following extubation on 4/19  I have requested outpatient pulmonary follow-up/EBUS     CAP (community acquired pneumonia)  Assessment & Plan  Suspected based on CT chest findings, elevated procal  Cont empiric unasyn, follow-up cultures       PTSD (post-traumatic stress disorder)  Assessment & Plan  History of  -Resume home medication     Stage 3 chronic kidney disease  Assessment & Plan  Not sure about baseline, she has a recent [facility]UM[facility]KO around August 2024 in [facility]  Currently GFR 48, creatinine 1.2  Urinalysis showed protein and granular cast  -Strict I/O  -Avoid nephrotoxicity medication  -Trending renal function  -Urine protein and creatinine ratio    Malnutrition (HCC)  Assessment & Plan  BMI only 17.8  -Nutrition consult   -Case management  -Patient denies recent weight loss    Elevated brain natriuretic peptide (BNP) level  Assessment & Plan  NT proBNP 3877  POCUS w/ moderately reduced LV systolic function   -Pending formal echo  -Lasix 40 mg IV daily    Elevated troponin  Assessment & Plan  Suspect demand ischemia in setting of respiratory failure, possible pna  ECG w/out  ischemic changes  Formal echo pending    Other specified anemias- (present on admission)  Assessment & Plan  Unclear etiology  Mild  Cont to monitor     Tobacco use disorder- (present on admission)  Assessment & Plan  Patient actively smoking around 1/2 PPD   Counseled on smoking cessation   Nicotine patches PRN         VTE prophylaxis: SCDs/TEDs and enoxaparin ppx    I have performed a physical exam and reviewed and updated ROS and Plan today (4/19/2025). In review of yesterday's note (4/18/2025), there are no changes except as documented above.

## 2025-04-19 NOTE — THERAPY
Speech Language Therapy Contact Note    Patient Name: Manisha Rincon  Age:  74 y.o., Sex:  female  Medical Record #: [id number]  Today's Date: 4/19/2025      Interdisciplinary Plan of Care Collaboration   IDT Collaboration with  Nursing   Collaboration Comments Attempted to contact patient for swallow evaluation. Patient transferring floors. Will reattemp as schedule allows.   Session Information   Date / Session Number note only 4/19         Kathy Leigh, SLP

## 2025-04-19 NOTE — CARE PLAN
The patient is Watcher - Medium risk of patient condition declining or worsening    Shift Goals  Clinical Goals: wean fio2, trend labs, monitor UO  Patient Goals: ramirez  Family Goals: ramirez    Progress made toward(s) clinical / shift goals:    Problem: Mechanical Ventilation  Goal: Safe management of artificial airway and ventilation  Outcome: Progressing    Problem: Knowledge Deficit - Standard  Goal: Patient and family/care givers will demonstrate understanding of plan of care, disease process/condition, diagnostic tests and medications  Outcome: Progressing     Problem: Safety - Medical Restraint  Goal: Remains free of injury from restraints (Restraint for Interference with Medical Device)  Outcome: Progressing  Note: Patient assessed frequently for needs. Circulatory status checked and patient repositioned frequently. Restraint charting and necessity of restraints documented Q2 hours.    Problem: Pain - Standard  Goal: Alleviation of pain or a reduction in pain to the patient’s comfort goal  Outcome: Progressing  Note: Pain assessed using the COPT pain scale and pt medicated per MAR. Pt educated on non-pharmacological interventions including repositioning, distraction and heat/cold packs.

## 2025-04-19 NOTE — CONSULTS
"Hospital Medicine Consultation    Date of Service  4/19/2025    Referring Physician  Juan Carlos Salomon M.D.    Consulting Physician  Victorino Kearns D.O.    Reason for Consultation  Altered mental status    History of Presenting Illness  75yo PMHx methadone maintenance, chonric pain, CKD 3, HFrEF, PE, Hep C, HTN, tobacco use, PTSD, medication non compliance.  Found down at home and intubated in the ED for airway protection.  Extubated 4/19.      On my examination pt c/o pain \"all over\" but particularly in her R hip; she had a heavy mirror fall on it a few days prior to her being found down.      DW pt's friend Jerrod   at bedside     Review of Systems  Review of Systems   Constitutional:  Negative for chills and fever.   Respiratory:  Negative for cough and shortness of breath.    Cardiovascular:  Negative for chest pain, palpitations and leg swelling.   Gastrointestinal:  Negative for abdominal pain, diarrhea, nausea and vomiting.   Genitourinary:  Negative for dysuria and urgency.   Musculoskeletal:  Positive for joint pain and myalgias. Negative for back pain.   Skin:  Negative for rash.   Neurological:  Negative for dizziness, loss of consciousness and headaches.       Past Medical History     has a past medical history of Anxiety, Arthritis, Blind left eye, Blindness of one eye, Blood clotting disorder ([facility]) (2009), Blood transfusion, Chronic pain, Clotting disorder ([facility]), Dental disorder, Hemorrhagic disorder ([facility]), HEP C, Hepatitis C, Hypertension, Pneumonia (2006), Psychiatric disorder, and Screening mammogram (12/09).    She has no past medical history of Allergy or Breast cancer ([facility]).    Surgical History   has a past surgical history that includes other; pr breast augmentation with implant; tonsillectomy; cervical disk and fusion anterior (N/A, 4/8/2016); cervical fusion posterior (N/A, 4/8/2016); hip arthroplasty total (Left, 3/7/2017); pr revise acetabular part of total hip (Left, 7/19/2024); and " pr secd clos surg wnd exten/complic (Left, 8/27/2024).    Family History  family history includes Cancer in her father and sister.    Social History   reports that she has been smoking cigarettes. She started smoking about 61 years ago. She has a 45 pack-year smoking history. She has never been exposed to tobacco smoke. She has never used smokeless tobacco. She reports current drug use. Drugs: IV and Marijuana. She reports that she does not drink alcohol.    Medications  Prior to Admission Medications   Prescriptions Last Dose Informant Patient Reported? Taking?   ALPRAZolam (XANAX) 1 MG Tab Unknown Patient's Home Pharmacy Yes No   Sig: Take 1 mg by mouth 3 times a day as needed for Anxiety.   citalopram (CELEXA) 10 MG tablet Unknown Patient's Home Pharmacy Yes No   Sig: Take 10 mg by mouth every day.   prazosin (MINIPRESS) 1 MG Cap Unknown Patient's Home Pharmacy Yes No   Sig: Take 1 mg by mouth every evening. Indications: PTSD   zolpidem (AMBIEN) 5 MG Tab Unknown Patient's Home Pharmacy Yes No   Sig: Take 5 mg by mouth at bedtime.      Facility-Administered Medications: None       Allergies  No Known Allergies    Physical Exam  Temp:  [36.7 °C (98.1 °F)-37.8 °C (100 °F)] 37 °C (98.6 °F)  Pulse:  [53-83] 69  Resp:  [16-33] 23  BP: ()/(50-91) 125/70  SpO2:  [90 %-99 %] 91 %    Physical Exam  Constitutional:       General: She is not in acute distress.     Appearance: Normal appearance. She is well-developed. She is cachectic. She is not diaphoretic.   HENT:      Head: Normocephalic and atraumatic.   Neck:      Vascular: No JVD.   Cardiovascular:      Rate and Rhythm: Normal rate and regular rhythm.      Heart sounds: No murmur heard.  Pulmonary:      Effort: Pulmonary effort is normal. No respiratory distress.      Breath sounds: No stridor. No wheezing or rales.   Abdominal:      Palpations: Abdomen is soft.      Tenderness: There is no abdominal tenderness. There is no guarding or rebound.   Musculoskeletal:       Right lower leg: No edema.      Left lower leg: No edema.   Skin:     General: Skin is warm and dry.      Findings: No rash.   Neurological:      Mental Status: She is oriented to person, place, and time.      Comments: Alert oriented and interactive  Face symmetric speech clear and logical  Antigravity x 4   Psychiatric:         Mood and Affect: Mood normal.         Behavior: Behavior normal.         Thought Content: Thought content normal.       Fluids  Date 04/19/25 0700 - 04/20/25 0659   Shift 7536-7889 1366-5094 0229-2892 24 Hour Total   INTAKE   I.V. 2.8   2.8   Shift Total 2.8   2.8   OUTPUT   Urine 50   50   Shift Total 50   50   Weight (kg) 39.5 39.5 39.5 39.5       Laboratory  Recent Labs     04/17/25  1120 04/18/25  0130 04/19/25  0440   WBC 16.5* 14.0* 13.8*   RBC 4.95 5.07 4.18*   HEMOGLOBIN 13.1 13.7 11.2*   HEMATOCRIT 41.8 41.4 34.8*   MCV 84.4 81.7 83.3   MCH 26.5* 27.0 26.8*   MCHC 31.3* 33.1 32.2   RDW 48.1 46.6 47.8   PLATELETCT 336 329 324   MPV 9.4 9.8 9.9     Recent Labs     04/17/25  1120 04/18/25  0130 04/19/25  0440   SODIUM 133* 132* 135   POTASSIUM 4.2 3.6 4.5   CHLORIDE 98 99 101   CO2 20 20 24   GLUCOSE 153* 129* 112*   BUN 24* 25* 32*   CREATININE 1.20 1.09 0.94   CALCIUM 9.0 8.1* 7.9*     Recent Labs     04/17/25  1120   INR 1.31*          Recent Labs     04/17/25  1120 04/17/25  2050   TRIGLYCERIDE 204* 170*        Imaging  US-RUQ   Final Result      1.  No acute abnormalities are noted on ultrasound right upper quadrant abdomen.      DX-ABDOMEN FOR TUBE PLACEMENT   Final Result      DHT tip projects over the mid to distal stomach      DX-CHEST-PORTABLE (1 VIEW)   Final Result         1.  Patchy right midlung infiltrates, stable since prior.   2.  Atherosclerosis      DX-CHEST-PORTABLE (1 VIEW)   Final Result         1.  Patchy right midlung infiltrates.      CT-CTA CHEST PULMONARY ARTERY W/ RECONS   Final Result         1.  No pulmonary embolus appreciated.   2.  Enlargement of  the main pulmonary artery, consider pulmonary arterial hypertension.   3.  Patchy bilateral pulmonary infiltrates, greatest in the right lung base.   4.  Soft tissue density in the right tevin tracking along the airways, could represent adenopathy or mass, could be further evaluated with bronchoscopy.   5.  Atherosclerosis and atherosclerotic coronary artery disease.      CT-ABDOMEN-PELVIS WITH   Final Result         1.  Hepatomegaly.   2.  Low-density right hepatic lobe lesion, appearance favors hepatic cyst.   3.  Common bile duct dilatation, consider causes of biliary obstruction with additional workup as clinically appropriate.   4.  Atherosclerosis      Note: There is severe metallic artifacts from orthopedic hardware which severely limits diagnostic sensitivity of this study.      DX-ABDOMEN FOR TUBE PLACEMENT   Final Result      NG tube tip projects within the stomach.      CT-HEAD W/O   Final Result      No acute intracranial abnormality.         A new left parietal lytic calvarial lesion concerning for malignancy.                        DX-CHEST-PORTABLE (1 VIEW)   Final Result      1.  Mild interstitial opacities could be related to edema or infection.   2.  Asymmetric right basilar opacity could be atelectasis or pneumonitis.   3.  Trace/small right pleural effusion.   4.  NG tube tip is obscured but probably at the gastroesophageal junction. Recommend advancement.      EC-ECHOCARDIOGRAM COMPLETE W/O CONT    (Results Pending)       Assessment/Plan  * Respiratory failure with hypoxia (HCC)- (present on admission)  Assessment & Plan  Intubated for airway protection 4/17 and extubated 4/19  O2/RT protocols  Mobilize  Treating pneumonia    Chronic prescription opiate use  Assessment & Plan  Pt takes 55mg of methadone daily by her report: unable to confirm on a weekend as the clinic she goes to is closed  Giving 20mg for now   Prn oxycodone    Mass of right lung  Assessment & Plan  Referral placed for Pulm  follow up and Bx  Also noted to have cranial lytic lestion  DW pt and her friend at bedside    CAP (community acquired pneumonia)  Assessment & Plan  Amp/sulbactam  Follow cultures    Stage 3 chronic kidney disease  Assessment & Plan  Creat runs around 1.0  Her degree of cachexia makes her degree of renal failure underestimated  Monitor daily BMP  Renally dose medications as appropriate    Malnutrition (HCC)  Assessment & Plan  Severe protein calorie malnutrition  Currently on TF's  Nurtion consulted    Elevated brain natriuretic peptide (BNP) level  Assessment & Plan  Clinically is not in heart failure  Does have findings indicative of pulmonary HTN on CT chest would explain elevated BNP    Elevated troponin  Assessment & Plan  Type II event  EKG which I have reviewed does not indicate acute ischemia though limited by artifact  Cont Tele    Sepsis (HCC)  Assessment & Plan  Resp source  Plan 5 days of amp/sulbactam    AMS (altered mental status)  Assessment & Plan  ?methadone OD: pt had been having incresed pain due to minor  Pt is now back to her baseline mental status  Working on confirming her methadone dose: she reports she takes 55mg once daily.  Once confirmed will resume full dosing, for now giving 20mg  Monitor Neuro status    Other specified anemias- (present on admission)  Assessment & Plan  Mild: came in Hgb 13's, now 11 s/p resucitation  monitor    Tobacco use disorder- (present on admission)  Assessment & Plan  Prn replacement  Encourage cessation

## 2025-04-19 NOTE — ASSESSMENT & PLAN NOTE
Patient reports taking methadone 55 mg PO daily  Unable to confirm with methadone clinic on weekend,  shows only benzos and zolpidem  Will start 20 mg PO methadone today (4/19) to prevent w/d

## 2025-04-19 NOTE — ASSESSMENT & PLAN NOTE
Unclear etiology, possibly shock liver  RUQ u/s ordered, acute hep panel ordered  Cont to trend daily

## 2025-04-19 NOTE — ASSESSMENT & PLAN NOTE
Type II event  EKG which I have reviewed does not indicate acute ischemia though limited by artifact  Cont Tele

## 2025-04-19 NOTE — DIETARY
"Nutrition Services: Initial Assessment     Day 2 of admit. Manisha Rincon is 74 y.o., female with admitting DX of Respiratory failure with hypoxia (HCC) [J96.91].    Consult Received for: EN, FTT/Malnutrition, and BMI <19    Current Hospital Problems List:    Principal Problem:    Respiratory failure with hypoxia (HCC) (POA: Yes)  Active Problems:    Tobacco use disorder (POA: Yes)    Other specified anemias (POA: Yes)    AMS (altered mental status) (POA: Unknown)    Sepsis (HCC) (POA: Unknown)    Elevated troponin (POA: Unknown)    Elevated brain natriuretic peptide (BNP) level (POA: Unknown)    Malnutrition (HCC) (POA: Unknown)    Stage 3 chronic kidney disease (POA: Unknown)    PTSD (post-traumatic stress disorder) (POA: Unknown)    CAP (community acquired pneumonia) (POA: Unknown)    Mass of right lung (POA: Unknown)    Chronic prescription opiate use (POA: Unknown)    Liver injury (POA: Unknown)  Resolved Problems:    * No resolved hospital problems. *    Nutrition Assessment:      Height: 167.6 cm (5' 6\")  Weight: 39.5 kg (87 lb 1.3 oz)  Weight taken via: Bed Scale  BMI Calculated:  14.06  BMI Classification: Underweight       Weight Readings from Last 5 encounters:   Wt Readings from Last 5 Encounters:   25 39.5 kg (87 lb 1.3 oz)   07/15/24 47.6 kg (105 lb)   08/10/21 53.5 kg (118 lb)   21 82.6 kg (182 lb 3.2 oz)   20 57.3 kg (126 lb 5.2 oz)       Calculation Equation: REE with  kcal x 1.2 - 1.5 = 1107 - 1383 kcal  Total Calories / day: 1100  - 1350 Calories / k   - 34   Total Grams Protein / day: 47 - 59 Grams Protein / k.2 - 1.5      Objective:   Pertinent Medical Hx: Extubated this morning.  Indication for Nutrition Support: Unable to meet nutrition needs orally due to AMS. Swallow evaluation ordered.  Enteral Access:   Enteral Tube 25 Other (Comment) 10 Fr. Other (Comment) (Active)   Iris feeding tube in stomach per xray.  Pertinent Labs: : Na 135, K+ 4.5, " Glu 112 (H), BUN 32 (H), Creat 0.94, Alb 2.3 (L), Phos 2.5, Mg 2.4  Pertinent Meds: Unasyn, Lasix  Skin/Wounds:  DTI right ischium  Food Allergies: None known  Last BM:  (pta)   Formula based on RD Eval: Jevity 1.2 [place]      Current Diet Order/Intake:   NPO      Subjective:   Patient asleep at time of visit and did not wake. Visually, current weight of 87 lb appears accurate. Unable to complete full NFPE, but patient with visually obvious severe muscle loss with scooping at temples and very prominent clavicle and bony, squared shoulder. Patient with severe hollowing at orbitals.     Nutrition Focused Physical Exam (NFPE)  Weight Loss: Little weight history available in chart review. Patient with 17% weight loss x 9 months, but unclear if weight in July was stated or estimated.  Muscle Mass: Severe Wasting at temples, clavicle/shoulder.  Subcutaneous Fat: Severe Loss at orbitals  Fluid Accumulation: None noted  Reduced  Strength: N/A in acute care setting.    Nutrition Diagnosis:      Inadequate Oral Intake related to AMS as evidenced by patient NPO with need for tube feeding.      Severe Malnutrition in context of Chronic Illness related to likely poor oral intake as evidenced by severe fat and muscle loss.      RD notified provider Juan Carlos Salomon MD via Voalte message.     Nutrition Interventions:      Initiate Jevity 1.2 at 25 ml/hr continuous and advance by 10 ml every 12 hours to goal rate of 45 ml/hr.  Goal tube feed volume provides 1296 kcals, 60 g protein, and 872 ml free water daily.   Monitor for refeeding: Order BMP with Mg and Phos x 7 days. Replete K, Phos and Mg prn. Supplement 100 mg Thiamine x 7 days to reduce risk of refeeding.  Additional fluids per Provider.  Patient aware of active plan of care as appropriate.       Nutrition Monitoring and Evaluation:      Monitor nutrition POC, goal for >85% nutrition needs met via tube feeding.  Diet initiation and advancement per Provider/SLP.  Monitor  vital signs pertinent to nutrition.    RD following and will provide updated recommendations as indicated.      Eliza Raman R.D.                                         ASPEN/AND CRITERIA FOR MALNUTRITION     Benzoyl Peroxide Counseling: Patient counseled that medicine may cause skin irritation and bleach clothing.  In the event of skin irritation, the patient was advised to reduce the amount of the drug applied or use it less frequently.   The patient verbalized understanding of the proper use and possible adverse effects of benzoyl peroxide.  All of the patient's questions and concerns were addressed.

## 2025-04-19 NOTE — ASSESSMENT & PLAN NOTE
Concerned that she had unintentional overdose on methadone +/- benzodiazepine.  She reports being on 55 mg methadone once daily and PDMP reveals that she is also on alprazolam 1 mg 3 times daily and zolpidem 5 mg at bedtime.  As per patient's report, she is on 258.5 MME per day which is 8.9X high risk of overdose, especially in the setting of benzodiazepine dependence.   Pt is now nearing her baseline mental status.  Her methadone dose of 55 mg daily needs to be confirmed on Monday once the clinic is open.  For now continue 20 mg dose started this admission.  As needed oxycodone 5 to 10 mg for pain that is not controlled by methadone.  No IV opioids.  She needs to be weaned off her outpatient benzodiazepines as well.    Monitor and limit as many centrally acting medications as possible

## 2025-04-19 NOTE — ASSESSMENT & PLAN NOTE
Intubated for airway protection 4/17 and extubated 4/19.  Requiring 10 to 12 L O2 via nasal cannula while on the floor overnight on 4/20.  Able to wean down to 6 L with saturations above 92% during my evaluation on 4/20.  Likely a combination of aspiration and atelectasis.  Continue Unasyn for aspiration pneumonia, DuoNebs  Incentive spirometer/flutter valve  O2/RT protocols  Mobilize

## 2025-04-19 NOTE — PROGRESS NOTES
Resident Chwa notified when placing pt on Levo @ 0.1 for consistent MAP <65 and low UO. Pt asymptomatic, and presentation unchanged.

## 2025-04-19 NOTE — PROGRESS NOTES
Iris Placement    Tube Team verified patient name and medical record number prior to tube placement. Iris feeding tube (43 inches, 10 [place]) placed at 70cm in right nare. Per Iris picture, tube appears to be in the stomach.    Nursing Instructions: Await KUB to confirm placement before use for medications or feeding. Stylet, may be removed, please place in labeled bag with insufflation bulb and save in patient medication drawer.

## 2025-04-19 NOTE — PROGRESS NOTES
"Critical Care Progress Note    Date of admission  4/17/2025    Chief Complaint  74 y.o. female admitted 4/17/2025 with encephalopathy, respiratory failure    Hospital Course  \"In brief, patient is a 75 yo F with a PMHx of anxiety, CHF, anemia, PE cefepime induced encephalopthy who presents with AMS.  She was brought in by EMS from home after she was found down on the floor by a friend with agonal respirations.  EMS noted empty methadone and antibiotic pill bottles next to the patient.  She was also hypoxic.  Her last known well time was at 2230 the previous night.  Her [place] Coma Scale was 6 per EMS and improved to 10 after Narcan.  Upon arrival to the ED, she received a second dose of Narcan without improvement and given hypoxia and altered mental status, was intubated by the emergency physician.  She developed a fever and chest x-ray is concerning for pneumonia.  There was concern initially for possible flash pulmonary edema and she was given a dose of IV Lasix as well as empiric Rocephin and was started on a propofol drip.  At the time of my evaluation, patient is unable to provide any clinical history given her current clinical condition.  On chart review, she has been seen by orthopedics for multiple conditions.\"   Dr Gonda (4/17)       4/18: Patient awake and alert, but lethargic, infectious work-up negative, still on mechanical ventilator       Interval Problem Update  Reviewed last 24 hour events:              - Acute Overnight events:Off levophed   - Tm: Afebrile   - Hr: 56-80   - SBP:    - I/Os: +858 mL, 620 mL UOP   - Vasoactive Drips: None   - RASS: -1 Sedation: Precedex gtt Analgesia: Prn fent   - Respiratory Support APVC 16/370/8/50 SAT/SBT: Yes              - Lines/Tubes: pIVs, harris, ET tube, NG tube               - DVT Ppx: Lovenox   - Nutrition: TF, Bowel Reg: PRN, GI PPX: H2A   - Glucose control: In range   - Mobility: Up to edge of bed   - Abx: Unasyn, azithromycin              - Imaging: " Stable infiltrates in R lung on AM CXR   - Plan for Today: extubated to NC, start methadone, bedside swallow       Review of Systems  Review of Systems   Unable to perform ROS: Critical illness        Vital Signs for last 24 hours   Temp:  [36.7 °C (98.1 °F)-37.8 °C (100 °F)] 36.8 °C (98.2 °F)  Pulse:  [53-83] 57  Resp:  [16-25] 17  BP: ()/(50-91) 102/63  SpO2:  [91 %-98 %] 97 %    Hemodynamic parameters for last 24 hours       Respiratory Information for the last 24 hours  Vent Mode: Spont  Rate (breaths/min): 16  Vt Target (mL): 370  PEEP/CPAP: 8  P Support (PS + PEEP): 5  MAP: 10  Length of Weaning Trial (Hours): 1  Control VTE (exp VT): 387    Physical Exam   Physical Exam  Constitutional:       Comments: Cachectic-appearing female resting on mechanical ventilator in NAD   Eyes:      Conjunctiva/sclera: Conjunctivae normal.      Pupils: Pupils are equal, round, and reactive to light.   Cardiovascular:      Rate and Rhythm: Normal rate and regular rhythm.   Pulmonary:      Comments: Clear anteriorly, no wheezing, normal WOB on mechanical ventilator  Abdominal:      General: Abdomen is flat.      Palpations: Abdomen is soft.   Musculoskeletal:      Right lower leg: No edema.      Left lower leg: No edema.   Skin:     General: Skin is warm and dry.   Neurological:      Comments: Awake and alert, able to follow commands in all four extremities          Medications  Current Facility-Administered Medications   Medication Dose Route Frequency Provider Last Rate Last Admin    dexmedetomidine (Precedex) 400 mcg/100mL infusion  0.1-1.5 mcg/kg/hr Intravenous Continuous Juan Carlos Salomon M.D. 4.4 mL/hr at 04/19/25 0200 0.4 mcg/kg/hr at 04/19/25 0200    ampicillin/sulbactam (Unasyn) 3 g in  mL IVPB  3 g Intravenous Q6HRS Juan Carlos Salomon M.D.   Stopped at 04/19/25 0556    norepinephrine (Levophed) 8 mg in 250 mL NS infusion (premix)  0-1 mcg/kg/min Intravenous Continuous Juan Carlos Salomon M.D.   Paused at  04/19/25 0453    acetaminophen (Tylenol) tablet 650 mg  650 mg Enteral Tube Q4HRS PRN Rissa Navarro M.D.   650 mg at 04/18/25 2159    [Held by provider] furosemide (Lasix) injection 40 mg  40 mg Intravenous DAILY Camryn Clark M.D.   40 mg at 04/17/25 1236    Respiratory Therapy Consult   Nebulization Continuous RT Jeremy M Gonda, M.D.        famotidine (Pepcid) tablet 20 mg  20 mg Enteral Tube DAILY Jeremy M Gonda, M.D.   20 mg at 04/19/25 0517    Or    famotidine (Pepcid) injection 20 mg  20 mg Intravenous DAILY Jeremy M Gonda, M.D.        senna-docusate (Pericolace Or Senokot S) 8.6-50 MG per tablet 2 Tablet  2 Tablet Enteral Tube BID Jeremy M Gonda, M.D.   2 Tablet at 04/19/25 0517    And    polyethylene glycol/lytes (Miralax) Packet 1 Packet  1 Packet Enteral Tube QDAY PRN Jeremy M Gonda, M.D.        And    magnesium hydroxide (Milk Of Magnesia) suspension 30 mL  30 mL Enteral Tube QDAY PRN Jeremy M Gonda, M.D.        And    bisacodyl (Dulcolax) suppository 10 mg  10 mg Rectal QDAY PRN Jeremy M Gonda, M.D. MD Alert...ICU Electrolyte Replacement per Pharmacy   Other PHARMACY TO DOSE Jeremy M Gonda, M.D.        lidocaine (Xylocaine) 1 % injection 2 mL  2 mL Tracheal Tube Q30 MIN PRN Jeremy M Gonda, M.D.        Pharmacy Consult: Enteral tube insertion - review meds/change route/product selection  1 Each Other PHARMACY TO DOSE Jeremy M Gonda, M.D.        fentaNYL (Sublimaze) injection 50 mcg  50 mcg Intravenous Q HOUR PRN Jeremy M Gonda, M.D.        Or    fentaNYL (Sublimaze) injection 100 mcg  100 mcg Intravenous Q HOUR PRN Jeremy M Gonda, M.D.   100 mcg at 04/18/25 1138    ipratropium-albuterol (DUONEB) nebulizer solution  3 mL Nebulization Q2HRS PRN (RT) Jeremy M Gonda, M.D.        LR (Bolus) infusion 500 mL  500 mL Intravenous Once PRN Jeremy M Gonda, M.D.        enoxaparin (Lovenox) inj 30 mg  30 mg Subcutaneous DAILY AT 1800 Jeremy M Gonda, M.D.   30 mg at 04/18/25 1705    labetalol  (Normodyne/Trandate) injection 10 mg  10 mg Intravenous Q4HRS PRN Jeremy M Gonda, M.D.        ondansetron (Zofran) syringe/vial injection 4 mg  4 mg Intravenous Q4HRS PRN Jeremy M Gonda, M.D.        Or    ondansetron (Zofran ODT) dispertab 4 mg  4 mg Enteral Tube Q4HRS PRN Jeremy M Gonda, M.D.        citalopram (CeleXA) tablet 10 mg  10 mg Enteral Tube DAILY Jeremy M Gonda, M.D.   10 mg at 04/19/25 0518    Nozin nasal  swab  1 Applicator Each Nostril BID Jeremy M Gonda, M.D.   1 Applicator at 04/19/25 0523       Fluids    Intake/Output Summary (Last 24 hours) at 4/19/2025 0645  Last data filed at 4/19/2025 0537  Gross per 24 hour   Intake 1485.52 ml   Output 627 ml   Net 858.52 ml       Laboratory  Recent Labs     04/17/25  1206 04/18/25  0410 04/19/25  0354   ISTATAPH 7.408 7.533* 7.497*   ISTATAPCO2 40.7 30.3* 36.9   ISTATAPO2 73* 68* 81*   ISTATATCO2 27* 26* 30*   GVXMEHR0IPN 95 95 97   ISTATARTHCO3 25.7 25.5 28.6*   ISTATARTBE 1 4* 5*   ISTATTEMP 101.8 F 36.5 C 36.7 C   ISTATFIO2 100 60 60   ISTATSPEC Arterial Arterial Arterial   ISTATAPHTC 7.382 7.540* 7.502*   TWBUWWJY2CP 83 65* 80*     Recent Labs     04/17/25  1120   CPKTOTAL 262*     Recent Labs     04/17/25  1120 04/18/25  0130 04/19/25  0440   SODIUM 133* 132* 135   POTASSIUM 4.2 3.6 4.5   CHLORIDE 98 99 101   CO2 20 20 24   BUN 24* 25* 32*   CREATININE 1.20 1.09 0.94   MAGNESIUM  --  1.7 2.4   PHOSPHORUS  --  2.6 2.5   CALCIUM 9.0 8.1* 7.9*     Recent Labs     04/17/25  1120 04/18/25  0130 04/19/25  0440   ALTSGPT 27 60* 203*   ASTSGOT 70* 115* 235*   ALKPHOSPHAT 134* 132* 123*   TBILIRUBIN 0.6 0.6 0.4   PREALBUMIN  --  3.8*  --    GLUCOSE 153* 129* 112*     Recent Labs     04/17/25  1120 04/18/25  0130 04/19/25  0440   WBC 16.5* 14.0* 13.8*   NEUTSPOLYS 92.30* 82.10* 82.90*   LYMPHOCYTES 4.30* 7.80* 7.90*   MONOCYTES 1.70 5.10 6.80   EOSINOPHILS 0.00 2.80 0.30   BASOPHILS 0.00 0.50 0.50   ASTSGOT 70* 115* 235*   ALTSGPT 27 60* 203*    ALKPHOSPHAT 134* 132* 123*   TBILIRUBIN 0.6 0.6 0.4     Recent Labs     04/17/25  1120 04/18/25  0130 04/19/25  0440   RBC 4.95 5.07 4.18*   HEMOGLOBIN 13.1 13.7 11.2*   HEMATOCRIT 41.8 41.4 34.8*   PLATELETCT 336 329 324   PROTHROMBTM 16.4*  --   --    INR 1.31*  --   --        Imaging  X-Ray:  I have personally reviewed the images and compared with prior images.  CT:    Reviewed  Echo:   Reviewed    Assessment/Plan  * Respiratory failure with hypoxia (HCC)- (present on admission)  Assessment & Plan  Suspect in setting of methadone intoxication/overdose vs PNA  Patient intubated on 4/17   CTA chest w/ patch opacities in RLL   Bcx NGTD, procal elevated, extended viral panel negative  Extubated to NC on AM of 4/19    Plan  Cont empiric PNA treatment with unasyn x5 days and azithromycin x3 days  Wean NC as able  Encourage IS, OOB    AMS (altered mental status)  Assessment & Plan  Patient found with minimally responsive at home by BF on evening of 4/17 surrounded by empty methadone bottle  Last normal time 4/16 10:30 PM  CT head negative  UDS + for meth, fentanyl, methadone, benzos and cannabinoids  Highest suspicion is for intoxication in setting of polysubstance use   Patient denies taking methamphetamines or other illicit drugs despite utox  Awake, alert and oriented x3 as of morning of 4/19       Liver injury  Assessment & Plan  Unclear etiology, possibly shock liver  RUQ u/s ordered, acute hep panel ordered  Cont to trend daily     Chronic prescription opiate use  Assessment & Plan  Patient reports taking methadone 55 mg PO daily  Unable to confirm with methadone clinic on weekend,  shows only benzos and zolpidem  Will start 20 mg PO methadone today (4/19) to prevent w/d     Mass of right lung  Assessment & Plan  Perihilar mass in R lung and lytic lesion in skull concerning for malignancy  Will need outpatient follow-up with pulmonary for EBUS   I made patient aware of these findings following extubation on  4/19  I have requested outpatient pulmonary follow-up/EBUS     CAP (community acquired pneumonia)  Assessment & Plan  Suspected based on CT chest findings, elevated procal  Cont empiric unasyn, follow-up cultures       PTSD (post-traumatic stress disorder)  Assessment & Plan  History of  -Resume home medication     Stage 3 chronic kidney disease  Assessment & Plan  Not sure about baseline, she has a recent YUMIKO around August 2024 in [facility]  Currently GFR 48, creatinine 1.2  Urinalysis showed protein and granular cast  -Strict I/O  -Avoid nephrotoxicity medication  -Trending renal function  -Urine protein and creatinine ratio    Malnutrition (HCC)  Assessment & Plan  BMI only 17.8  -Nutrition consult   -Case management  -Patient denies recent weight loss    Elevated brain natriuretic peptide (BNP) level  Assessment & Plan  NT proBNP 3877  POCUS w/ moderately reduced LV systolic function   -Pending formal echo  -Lasix 40 mg IV daily    Elevated troponin  Assessment & Plan  Suspect demand ischemia in setting of respiratory failure, possible pna  ECG w/out ischemic changes  Formal echo pending    Sepsis (HCC)  Assessment & Plan  This is Sepsis Present on admission  SIRS criteria identified on my evaluation include: Tachycardia, with heart rate greater than 90 BPM, Tachypnea, with respirations greater than 20 per minute, and Leukocytosis, with WBC greater than 12,000  Clinical indicators of end organ dysfunction include Lactic Acid greater than 2, Toxic Metabolic Encephalopathy, and Acute Respiratory Failure - (mechanical ventilation or BiPap or PaO2/FiO2 ratio < 300)  Source is pneumonia   Sepsis protocol initiated  Crystalloid Fluid Administration: Fluid resuscitation ordered per standard protocol - 30 mL/kg per current or ideal body weight  IV antibiotics as appropriate for source of sepsis  Reassessment: I have reassessed the patient's hemodynamic status    Patient is on sepsis but not sepsis  shock   - Possible PNA on CT chest  - Empirical antibiotic treatment with unasyn and azithromycin  - Trending lactate - cleared  - Trending CBC  - Following blood culture - NGTD    Other specified anemias- (present on admission)  Assessment & Plan  Unclear etiology  Mild  Cont to monitor     Tobacco use disorder- (present on admission)  Assessment & Plan  Patient actively smoking around 1/2 PPD   Counseled on smoking cessation   Nicotine patches PRN         VTE:  Heparin  Ulcer: H2 Antagonist  Lines: Benton Catheter  Ongoing indication addressed    I have performed a physical exam and reviewed and updated ROS and Plan today (4/19/2025). In review of yesterday's note (4/18/2025), there are no changes except as documented above.     Discussed patient condition and risk of morbidity and/or mortality with RN, RT, Pharmacy, Charge nurse / hot rounds, and Patient  The patient remains critically ill.  Critical care time = 40 minutes in directly providing and coordinating critical care and extensive data review.  No time overlap and excludes procedures.

## 2025-04-19 NOTE — ASSESSMENT & PLAN NOTE
Creat runs around 1.0  Her degree of cachexia makes her degree of renal failure underestimated  Monitor daily BMP  Renally dose medications as appropriate

## 2025-04-19 NOTE — ASSESSMENT & PLAN NOTE
Severe protein calorie malnutrition  Tube fluids were discontinued on 4/20/2025 as she pulled out her NG tube.  SLP evaluation pending.  Nurtion consulted

## 2025-04-19 NOTE — ASSESSMENT & PLAN NOTE
Very likely aspiration pneumonia.  Received 3 days of azithromycin.  Also on Unasyn since 4/18.  Plan to treat 5 days of antibiotics.  SLP following, diet when safely able to swallow.  Pulled out her NG tube on 4/20.  Hence discontinued tube feeds until SLP evaluates her.  Ideally would prefer that she starts oral diet instead of tube feeds which would also increase her risk of aspiration.  High risk aspiration precautions

## 2025-04-19 NOTE — ASSESSMENT & PLAN NOTE
"CT thorax this admission showed \" Soft tissue density in the right tevin tracking along the airways, could represent adenopathy or mass, could be further evaluated with bronchoscopy. \"  Also noted to have cranial lytic lestion  DW pt and her friend at bedside by Dr. Dedrick Hubbard  Needs outpatient pulmonology referral  "

## 2025-04-19 NOTE — PROGRESS NOTES
..4 Eyes Skin Assessment Completed by TOM Juaregui and TOM Landry.    Head discoloration to right and left cheek from under holister           Ears Redness and Blanching  Nose WDL  Mouth WDL  Neck Redness scratches  Breast/Chest Redness  Shoulder Blades Redness blanching  Spine Redness and Blanching  (R) Arm/Elbow/Hand Redness and Blanching Blisters right indedx          (L) Arm/Elbow/Hand Redness and Blanching  Abdomen WDL  Groin Redness and Blanching  Scrotum/Coccyx/Buttocks Redness, Blanching, and Excoriation DTI ischium   (R) Leg Redness, Blanching, and Edema  (L) Leg Redness, Blanching, and Edema  (R) Heel/Foot/Toe Redness, Blanching, and Non-Blanching Right 5th toe nonblanching redness, non blanching redness to right sd of foot    (L) Heel/Foot/Toe Redness, Blanching, and Non-Blanching          Devices In Places ECG, Blood Pressure Cuff, Pulse Ox, Benton, SCD's, and Nasal Cannula      Interventions In Place Gray Ear Foams, Heel Mepilex, Sacral Mepilex, Heel Float Boots, TAP System, Pillows, Elbow Mepilex, Optifoam, Q2 Turns, Low Air Loss Mattress, Barrier Cream, ZFlo Pillow, Dri-Leonard Pads, Heels Loaded W/Pillows, and Pressure Redistribution Mattress    Possible Skin Injury Yes    Pictures Uploaded Into Epic Yes  Wound Consult Placed Yes  RN Wound Prevention Protocol Ordered Yes

## 2025-04-20 ENCOUNTER — APPOINTMENT (OUTPATIENT)
Dept: RADIOLOGY | Facility: MEDICAL CENTER | Age: 75
DRG: 917 | End: 2025-04-20
Payer: MEDICARE

## 2025-04-20 LAB
ALBUMIN SERPL BCP-MCNC: 2.9 G/DL (ref 3.2–4.9)
ALBUMIN/GLOB SERPL: 0.5 G/DL
ALP SERPL-CCNC: 168 U/L (ref 30–99)
ALT SERPL-CCNC: 227 U/L (ref 2–50)
ANION GAP SERPL CALC-SCNC: 13 MMOL/L (ref 7–16)
AST SERPL-CCNC: 194 U/L (ref 12–45)
BASOPHILS # BLD AUTO: 0.5 % (ref 0–1.8)
BASOPHILS # BLD: 0.05 K/UL (ref 0–0.12)
BILIRUB SERPL-MCNC: 0.5 MG/DL (ref 0.1–1.5)
BUN SERPL-MCNC: 26 MG/DL (ref 8–22)
CALCIUM ALBUM COR SERPL-MCNC: 9.4 MG/DL (ref 8.5–10.5)
CALCIUM SERPL-MCNC: 8.5 MG/DL (ref 8.5–10.5)
CHLORIDE SERPL-SCNC: 97 MMOL/L (ref 96–112)
CO2 SERPL-SCNC: 27 MMOL/L (ref 20–33)
CREAT SERPL-MCNC: 0.92 MG/DL (ref 0.5–1.4)
EOSINOPHIL # BLD AUTO: 0.05 K/UL (ref 0–0.51)
EOSINOPHIL NFR BLD: 0.5 % (ref 0–6.9)
ERYTHROCYTE [DISTWIDTH] IN BLOOD BY AUTOMATED COUNT: 49 FL (ref 35.9–50)
ERYTHROCYTE [DISTWIDTH] IN BLOOD BY AUTOMATED COUNT: 49 FL (ref 35.9–50)
GFR SERPLBLD CREATININE-BSD FMLA CKD-EPI: 66 ML/MIN/1.73 M 2
GLOBULIN SER CALC-MCNC: 5.9 G/DL (ref 1.9–3.5)
GLUCOSE SERPL-MCNC: 149 MG/DL (ref 65–99)
HCT VFR BLD AUTO: 42 % (ref 37–47)
HCT VFR BLD AUTO: 43.8 % (ref 37–47)
HGB BLD-MCNC: 13.1 G/DL (ref 12–16)
HGB BLD-MCNC: 13.6 G/DL (ref 12–16)
IMM GRANULOCYTES # BLD AUTO: 0.5 K/UL (ref 0–0.11)
IMM GRANULOCYTES NFR BLD AUTO: 4.9 % (ref 0–0.9)
L PNEUMO AG UR QL IA: NEGATIVE
LYMPHOCYTES # BLD AUTO: 0.74 K/UL (ref 1–4.8)
LYMPHOCYTES NFR BLD: 7.2 % (ref 22–41)
MAGNESIUM SERPL-MCNC: 2.2 MG/DL (ref 1.5–2.5)
MCH RBC QN AUTO: 26.2 PG (ref 27–33)
MCH RBC QN AUTO: 26.3 PG (ref 27–33)
MCHC RBC AUTO-ENTMCNC: 31.1 G/DL (ref 32.2–35.5)
MCHC RBC AUTO-ENTMCNC: 31.2 G/DL (ref 32.2–35.5)
MCV RBC AUTO: 84.2 FL (ref 81.4–97.8)
MCV RBC AUTO: 84.2 FL (ref 81.4–97.8)
MONOCYTES # BLD AUTO: 0.78 K/UL (ref 0–0.85)
MONOCYTES NFR BLD AUTO: 7.6 % (ref 0–13.4)
NEUTROPHILS # BLD AUTO: 8.17 K/UL (ref 1.82–7.42)
NEUTROPHILS NFR BLD: 79.3 % (ref 44–72)
NRBC # BLD AUTO: 0 K/UL
NRBC BLD-RTO: 0 /100 WBC (ref 0–0.2)
PHOSPHATE SERPL-MCNC: 3.2 MG/DL (ref 2.5–4.5)
PLATELET # BLD AUTO: 360 K/UL (ref 164–446)
PLATELET # BLD AUTO: 404 K/UL (ref 164–446)
PMV BLD AUTO: 9.4 FL (ref 9–12.9)
PMV BLD AUTO: 9.4 FL (ref 9–12.9)
POTASSIUM SERPL-SCNC: 4.2 MMOL/L (ref 3.6–5.5)
PROT SERPL-MCNC: 8.8 G/DL (ref 6–8.2)
RBC # BLD AUTO: 4.99 M/UL (ref 4.2–5.4)
RBC # BLD AUTO: 5.2 M/UL (ref 4.2–5.4)
S PNEUM AG UR QL: NEGATIVE
SODIUM SERPL-SCNC: 137 MMOL/L (ref 135–145)
WBC # BLD AUTO: 10.3 K/UL (ref 4.8–10.8)
WBC # BLD AUTO: 10.9 K/UL (ref 4.8–10.8)

## 2025-04-20 PROCEDURE — 80053 COMPREHEN METABOLIC PANEL: CPT

## 2025-04-20 PROCEDURE — 85027 COMPLETE CBC AUTOMATED: CPT

## 2025-04-20 PROCEDURE — 71045 X-RAY EXAM CHEST 1 VIEW: CPT

## 2025-04-20 PROCEDURE — 36415 COLL VENOUS BLD VENIPUNCTURE: CPT

## 2025-04-20 PROCEDURE — A9270 NON-COVERED ITEM OR SERVICE: HCPCS

## 2025-04-20 PROCEDURE — 85025 COMPLETE CBC W/AUTO DIFF WBC: CPT

## 2025-04-20 PROCEDURE — 92610 EVALUATE SWALLOWING FUNCTION: CPT

## 2025-04-20 PROCEDURE — A9270 NON-COVERED ITEM OR SERVICE: HCPCS | Performed by: STUDENT IN AN ORGANIZED HEALTH CARE EDUCATION/TRAINING PROGRAM

## 2025-04-20 PROCEDURE — 700102 HCHG RX REV CODE 250 W/ 637 OVERRIDE(OP)

## 2025-04-20 PROCEDURE — 700111 HCHG RX REV CODE 636 W/ 250 OVERRIDE (IP): Performed by: INTERNAL MEDICINE

## 2025-04-20 PROCEDURE — 700105 HCHG RX REV CODE 258: Performed by: STUDENT IN AN ORGANIZED HEALTH CARE EDUCATION/TRAINING PROGRAM

## 2025-04-20 PROCEDURE — 84100 ASSAY OF PHOSPHORUS: CPT

## 2025-04-20 PROCEDURE — 700102 HCHG RX REV CODE 250 W/ 637 OVERRIDE(OP): Performed by: STUDENT IN AN ORGANIZED HEALTH CARE EDUCATION/TRAINING PROGRAM

## 2025-04-20 PROCEDURE — 700111 HCHG RX REV CODE 636 W/ 250 OVERRIDE (IP): Mod: JZ | Performed by: STUDENT IN AN ORGANIZED HEALTH CARE EDUCATION/TRAINING PROGRAM

## 2025-04-20 PROCEDURE — 83735 ASSAY OF MAGNESIUM: CPT

## 2025-04-20 PROCEDURE — 99232 SBSQ HOSP IP/OBS MODERATE 35: CPT | Mod: GC | Performed by: STUDENT IN AN ORGANIZED HEALTH CARE EDUCATION/TRAINING PROGRAM

## 2025-04-20 PROCEDURE — A9270 NON-COVERED ITEM OR SERVICE: HCPCS | Performed by: INTERNAL MEDICINE

## 2025-04-20 PROCEDURE — 770020 HCHG ROOM/CARE - TELE (206)

## 2025-04-20 PROCEDURE — 700102 HCHG RX REV CODE 250 W/ 637 OVERRIDE(OP): Performed by: INTERNAL MEDICINE

## 2025-04-20 RX ORDER — ONDANSETRON 2 MG/ML
4 INJECTION INTRAMUSCULAR; INTRAVENOUS EVERY 4 HOURS PRN
Status: DISCONTINUED | OUTPATIENT
Start: 2025-04-20 | End: 2025-04-22 | Stop reason: HOSPADM

## 2025-04-20 RX ORDER — AMOXICILLIN 250 MG
2 CAPSULE ORAL 2 TIMES DAILY
Status: DISCONTINUED | OUTPATIENT
Start: 2025-04-20 | End: 2025-04-22 | Stop reason: HOSPADM

## 2025-04-20 RX ORDER — POLYETHYLENE GLYCOL 3350 17 G/17G
1 POWDER, FOR SOLUTION ORAL
Status: DISCONTINUED | OUTPATIENT
Start: 2025-04-20 | End: 2025-04-22 | Stop reason: HOSPADM

## 2025-04-20 RX ORDER — OXYCODONE HYDROCHLORIDE 5 MG/1
5 TABLET ORAL
Refills: 0 | Status: DISCONTINUED | OUTPATIENT
Start: 2025-04-20 | End: 2025-04-22 | Stop reason: HOSPADM

## 2025-04-20 RX ORDER — ONDANSETRON 4 MG/1
4 TABLET, ORALLY DISINTEGRATING ORAL EVERY 4 HOURS PRN
Status: DISCONTINUED | OUTPATIENT
Start: 2025-04-20 | End: 2025-04-22 | Stop reason: HOSPADM

## 2025-04-20 RX ORDER — ACETAMINOPHEN 500 MG
1000 TABLET ORAL EVERY 8 HOURS
Status: DISCONTINUED | OUTPATIENT
Start: 2025-04-20 | End: 2025-04-20

## 2025-04-20 RX ORDER — BISACODYL 10 MG
10 SUPPOSITORY, RECTAL RECTAL
Status: DISCONTINUED | OUTPATIENT
Start: 2025-04-20 | End: 2025-04-22 | Stop reason: HOSPADM

## 2025-04-20 RX ORDER — ACETAMINOPHEN 500 MG
1000 TABLET ORAL EVERY 8 HOURS
Status: DISCONTINUED | OUTPATIENT
Start: 2025-04-20 | End: 2025-04-22 | Stop reason: HOSPADM

## 2025-04-20 RX ORDER — CITALOPRAM HYDROBROMIDE 20 MG/1
10 TABLET ORAL DAILY
Status: DISCONTINUED | OUTPATIENT
Start: 2025-04-21 | End: 2025-04-22 | Stop reason: HOSPADM

## 2025-04-20 RX ORDER — OXYCODONE HYDROCHLORIDE 10 MG/1
10 TABLET ORAL
Refills: 0 | Status: DISCONTINUED | OUTPATIENT
Start: 2025-04-20 | End: 2025-04-22 | Stop reason: HOSPADM

## 2025-04-20 RX ORDER — METHADONE HYDROCHLORIDE 10 MG/1
20 TABLET ORAL DAILY
Refills: 0 | Status: DISCONTINUED | OUTPATIENT
Start: 2025-04-21 | End: 2025-04-22 | Stop reason: HOSPADM

## 2025-04-20 RX ADMIN — OXYCODONE HYDROCHLORIDE 10 MG: 10 TABLET ORAL at 21:21

## 2025-04-20 RX ADMIN — AMPICILLIN SODIUM, SULBACTAM SODIUM 3 G: 2; 1 INJECTION, POWDER, FOR SOLUTION INTRAMUSCULAR; INTRAVENOUS at 17:22

## 2025-04-20 RX ADMIN — OXYCODONE HYDROCHLORIDE 10 MG: 10 TABLET ORAL at 10:33

## 2025-04-20 RX ADMIN — AMPICILLIN SODIUM, SULBACTAM SODIUM 3 G: 2; 1 INJECTION, POWDER, FOR SOLUTION INTRAMUSCULAR; INTRAVENOUS at 11:55

## 2025-04-20 RX ADMIN — Medication 1 APPLICATOR: at 04:08

## 2025-04-20 RX ADMIN — SENNOSIDES AND DOCUSATE SODIUM 2 TABLET: 50; 8.6 TABLET ORAL at 04:07

## 2025-04-20 RX ADMIN — ENOXAPARIN SODIUM 30 MG: 100 INJECTION SUBCUTANEOUS at 17:19

## 2025-04-20 RX ADMIN — METHADONE HYDROCHLORIDE 20 MG: 10 TABLET ORAL at 04:07

## 2025-04-20 RX ADMIN — Medication 1 APPLICATOR: at 17:19

## 2025-04-20 RX ADMIN — NICOTINE 14 MG: 14 PATCH TRANSDERMAL at 04:08

## 2025-04-20 RX ADMIN — FUROSEMIDE 40 MG: 10 INJECTION, SOLUTION INTRAVENOUS at 04:04

## 2025-04-20 RX ADMIN — CITALOPRAM HYDROBROMIDE 10 MG: 20 TABLET ORAL at 04:07

## 2025-04-20 RX ADMIN — SENNOSIDES AND DOCUSATE SODIUM 2 TABLET: 50; 8.6 TABLET ORAL at 17:19

## 2025-04-20 RX ADMIN — AMPICILLIN SODIUM, SULBACTAM SODIUM 3 G: 2; 1 INJECTION, POWDER, FOR SOLUTION INTRAMUSCULAR; INTRAVENOUS at 04:07

## 2025-04-20 ASSESSMENT — PAIN DESCRIPTION - PAIN TYPE
TYPE: ACUTE PAIN
TYPE: CHRONIC PAIN
TYPE: CHRONIC PAIN

## 2025-04-20 ASSESSMENT — ENCOUNTER SYMPTOMS
COUGH: 0
NAUSEA: 0
VOMITING: 0
LOSS OF CONSCIOUSNESS: 0
DIZZINESS: 0
ABDOMINAL PAIN: 0
HEADACHES: 0
CHILLS: 0
BACK PAIN: 0
FEVER: 0
SHORTNESS OF BREATH: 0
MYALGIAS: 1
DIARRHEA: 0
PALPITATIONS: 0

## 2025-04-20 ASSESSMENT — FIBROSIS 4 INDEX: FIB4 SCORE: 3.67

## 2025-04-20 NOTE — CARE PLAN
The patient is Stable - Low risk of patient condition declining or worsening    Shift Goals  Clinical Goals: safety, pain management  Patient Goals: pain control, rest  Family Goals: KELLEE    Progress made toward(s) clinical / shift goals:    Problem: Respiratory  Goal: Patient will achieve/maintain optimum respiratory ventilation and gas exchange  Description: Target End Date:  Prior to discharge or change in level of careDocument on Assessment flowsheet1.  Assess and monitor rate, rhythm, depth and effort of respiration2.  Breath sounds assessed qshift and/or as needed3.  Assess O2 saturation, administer/titrate oxygen as ordered4.  Position patient for maximum ventilatory efficiency5.  Turn, cough, and deep breath with splinting to improve effectiveness6.  Collaborate with RT to administer medication/treatments per order7.  Encourage use of incentive spirometer and encourage patient to cough after use and utilize splinting techniques if applicable8.  Airway suctioning9.  Monitor sputum production for changes in color, consistency and [id number]. Perform frequent oral ilemthw13. Alternate physical activity with rest periods  Outcome: Progressing  Pt was able to be weaned down to 1L of O2 via NC and is currently at an O2 saturation of 91%. Will continue to monitor.       Patient is not progressing towards the following goals:      Problem: Knowledge Deficit - Standard  Goal: Patient and family/care givers will demonstrate understanding of plan of care, disease process/condition, diagnostic tests and medications  Description: Target End Date:  1-3 days or as soon as patient condition allowsDocument in Patient Education1.  Patient and family/caregiver oriented to unit, equipment, visitation policy and means for communicating concern2.  Complete/review Learning Assessment3.  Assess knowledge level of disease process/condition, treatment plan, diagnostic tests and medications4.  Explain disease process/condition,  treatment plan, diagnostic tests and medications  Outcome: Not Progressing    Pt has not been able to understand her NPO status despite regular reinforcement of this.

## 2025-04-20 NOTE — CARE PLAN
The patient is Stable - Low risk of patient condition declining or worsening    Shift Goals  Clinical Goals: safety, monitor respiratroy status, tube feeds  Patient Goals: pain control, rest  Family Goals: KELLEE    Progress made toward(s) clinical / shift goals:        Problem: Knowledge Deficit - Standard  Goal: Patient and family/care givers will demonstrate understanding of plan of care, disease process/condition, diagnostic tests and medications  Outcome: Progressing  Note: 1. Patient and family/caregiver oriented to unit, equipment, visitation policy and means for communicating concern2. Complete/review Learning Assessment3. Assess knowledge level of disease process/condition, treatment plan, diagnostic tests and medications4. Explain disease process/condition, treatment plan, diagnostic tests and medications   Patient alert and oriented, drowsy throughout shift, patient verbalized understanding on plan of care, continued with safety, tube feed and pain management. Monitored respiratory status, and patient on continuous pulse ox.      Problem: Skin Integrity  Goal: Skin integrity is maintained or improved  Outcome: Progressing  Note: Wedges in use for postioning, heel mepilex and heel float boots added for skin integrity, patient on FEI, q 2 hour turns in use. Educated patient on importance of skin integrity, purewick in place for incontinence.      Problem: Pain - Standard  Goal: Alleviation of pain or a reduction in pain to the patient’s comfort goal  Outcome: Progressing  Note: Patient verbalizes pain on  a scale of 1-10 throughout shift, rest and repositioning use for pain management, monitored vitals with pain medication, patient with chronic pain.        Patient is not progressing towards the following goals: n/a

## 2025-04-20 NOTE — ASSESSMENT & PLAN NOTE
Acute ischemic hepatitis from when she was found down prior to hospitalization.  LFTs downtrending

## 2025-04-20 NOTE — HOSPITAL COURSE
74-year-old female with PMHx of anxiety, opioid dependence/chronic pain on methadone, benzodiazepine dependence, HFpEF, and PE who was brought into the hospital by EMS on 4/17 after a friend found her down on the floor.  There were reports of empty methadone and antibiotic pill bottles next to the patient.  Last well-known was around 10:30 PM on 4/16.  Per EMS, GCS was 6 initially, improved to 10 after Narcan.  She was intubated emergently in the ED due to hypoxia and altered mentation/unable to protect airways.  She was extubated successfully 4/19/2025 and moved to the floor.  She is being treated empirically for aspiration pneumonia with Unasyn (completed 3 days of azithromycin in the ICU).     Patient reports that she takes 55 mg of methadone daily per her report but we have been unable to confirm with the clinic that she typically goes to (closed over the weekend).  She was started on 20 mg methadone while inpatient, with as needed oxycodone for additional pain management. She was initially started on tube feeds while in the ICU while intubated.  However she pulled out her NG tube on 4/20/2025, hence tube feeds were discontinued.  She is pending SLP evaluation and diet once appropriate.  As per our assessment, she does appear to be aspirating and has a poor cough.    Of note, looked at her PDMP and it appears that she is also on alprazolam 1 mg thrice daily and zolpidem 5 mg daily at bedtime that are being prescribed by her ?  Psychiatry APRN unclear.  Patient reports that she does not take zolpidem every day but has been on alprazolam for over 20 years does not want to discontinue it.    Interval update:    Patient's main problem/complaint this morning is pain all over her body.  She would like her prior dose of methadone to be prescribed but explained to her that we cannot do that before confirming with the clinic.      Explained that she is on very high doses of methadone, and taking it in addition to  alprazolam thrice daily and zolpidem puts her at very high risks for overdosing which can be fatal, which is likely what happened this time albeit unintentionally as patient denies having any SI/HI.  She reports not taking zolpidem every day but per PDMP it is being prescribed regularly every month.  She does not want to go down on the methadone or discontinue her alprazolam.  States that she has been through withdrawal in the past and does not want to go through it again.    Pull out her NG tube today.  Discontinue tube feeds.  Waiting on SLP evaluation and resuming oral diet when able to safely consume.  On bedside evaluation, she has a weak cough and is definitely aspirating.  Will keep n.p.o. until speech clears her.      She has been on 10 to 12 L O2 via nasal cannula overnight.  During my evaluation, reduce the O2 to 6 L and her saturations were consistently above 92%.  This is likely a combination of atelectasis and aspiration.  X-ray shows increased bibasilar airspace disease.

## 2025-04-20 NOTE — THERAPY
"Speech Language Pathology   Clinical Swallow Evaluation     Patient Name: aMnisha Rincon  AGE:  74 y.o., SEX:  female  Medical Record #: [id number]  Date of Service: 4/20/2025      History of Present Illness  74 y.o. female admitted 4/17/2025 with encephalopathy, hypoxia, possible overdose. Chest imaging concerning for PNA.    PMHx: anxiety, CHF, anemia, PE cefepime induced encephalopthy, PTSD, Stage 3 chronic kidney disease    No hx SLP in Epic or reported.    XR Chest: 4/19:  1.  Patchy right midlung infiltrates, stable since prior.  2.  Atherosclerosis    CT Chest 4/18:  1.  No pulmonary embolus appreciated.  2.  Enlargement of the main pulmonary artery, consider pulmonary arterial hypertension.  3.  Patchy bilateral pulmonary infiltrates, greatest in the right lung base.  4.  Soft tissue density in the right tevin tracking along the airways, could represent adenopathy or mass, could be further evaluated with bronchoscopy.  5.  Atherosclerosis and atherosclerotic coronary artery disease.    General Information:  Vitals  Pulse: 95  Pulse Oximetry: 93 %  O2 (LPM): 12  O2 Delivery Device: Silicone Nasal Cannula (Green NC)  Vitals Comments: reports that she \"should\" have home O2 but doesn't  Level of Consciousness: Alert, Awake  Patient Behaviors: Fatigue, Anxious  Orientation: Oriented x 4  Follows Directives: Yes      Prior Living Situation & Level of Function:  Communication: Unknown PLOF  Swallowing: Reports recurring PNA. RG/TN diet at baseline. Does report globus with pills       Oral Mechanism Evaluation:  Dentition: Edentulous, Denture(s) not available at time of evaluation (Reports full denture use)   Facial Symmetry: Equal  Facial Sensation: Equal     Labial Observations: Bilateral weakness   Lingual Observations: Left lingual deviation, Xerostomia  Motor Speech: Grossly WFL         Laryngeal Function:  Secretion Management: Adequate  Voice Quality: Hoarse (Improved after ice chips)  Cough: Perceptually " "weak  Comments: Pt reports rib pain with cough; denies throat pain with cough, speech, or swallow. Spontaneous cough perceptually stronger than cued cough      Subjective  Pt agreeable and cooperative with SLP evaluation tasks. Asked multiple times for \"ice\" and did express frustration with SLP recommendation for limited ice only under direct spv from staff.      Assessment  Current Method of Nutrition: NPO until cleared by speech pathology, NGT  Positioning: Sewlel's (60-90 degrees)  Bolus Administration: SLP  O2 (LPM): 12 O2 Delivery Device: Silicone Nasal Cannula (Green NC)  Factor(s) Affecting Performance: None  Tracheostomy : No       Swallowing Trials:  Ice: WFL  Thin Liquid (TN0): Impaired      Comments:   Xerostomia and vocal quality perceptually improved after ice chips. Adequate bolus stripping and containment. Able to achieve labial seal and intra-oral pressure for straw suction. Immediate, congested, and prolonged cough after sips of TN0, concerning for airway invasion.       Clinical Impressions  Pt presents with clinical indicators of and is at elevated risk for oropharyngeal dysphagia given acute respiratory failure, generalized weakness, and PNA. Pt would benefit from further evaluation of swallow using FEES prior to meaningful initiation of PO. Pt should have ice chips after oral care with direct assistance from trained staff to mitigate the impacts of xerostomia and disuse atrophy as well as to provide comfort and aid with secretion management.        Recommendations  NPO / NGT   - Okay for ice chips after oral care under direct spv  Instrumentation: FEES  Medication: Non Oral  Supervision: Careful 1:1 hand feeding with ice chips  Oral Care: Q2h      SLP Treatment Plan  Treatment Plan: Dysphagia Treatment, Patient/Family/Caregiver Training  SLP Frequency: 4x Per Week  Estimated Duration: Until Therapy Goals Met      Anticipated Discharge Needs  Discharge Recommendations: Recommend post-acute " "placement for additional speech therapy services prior to discharge home (changes pending FEES)   Therapy Recommendations Upon DC: Dysphagia Training, Patient / Family / Caregiver Education, Community Re-Integration        Patient / Family Goals  Patient / Family Goal #1: \"I need more ice.\"  Short Term Goals  Short Term Goal # 1: Pt will complete FEES w SLP to further evaluate swalow function and inform POC.      Cecilia Ortez, SLP   "

## 2025-04-20 NOTE — PROGRESS NOTES
[facility] Internal Medicine Daily Progress Note    Date of Service  4/20/2025    [facility] Team: [facility] Wild Team   Attending: Gordo Fritz M.d.  Senior Resident: Dr. Clemencia Lenz  Intern:  Dr. Mathew Betancourt  Contact Number: [phone]    Chief Complaint  Manisha Rincon is a 74 y.o. female admitted 4/17/2025 with altered mentation, found down by friend.    Hospital Course  74-year-old female with PMHx of anxiety, opioid dependence/chronic pain on methadone, benzodiazepine dependence, HFpEF, and PE who was brought into the hospital by EMS on 4/17 after a friend found her down on the floor.  There were reports of empty methadone and antibiotic pill bottles next to the patient.  Last well-known was around 10:30 PM on 4/16.  Per EMS, GCS was 6 initially, improved to 10 after Narcan.  She was intubated emergently in the ED due to hypoxia and altered mentation/unable to protect airways.  She was extubated successfully 4/19/2025 and moved to the floor.  She is being treated empirically for aspiration pneumonia with Unasyn (completed 3 days of azithromycin in the ICU).     Patient reports that she takes 55 mg of methadone daily per her report but we have been unable to confirm with the clinic that she typically goes to (closed over the weekend).  She was started on 20 mg methadone while inpatient, with as needed oxycodone for additional pain management.    She was initially started on tube feeds while in the ICU while intubated.  However she pulled out her NG tube on 4/20/2025, hence tube feeds were discontinued.  She is pending SLP evaluation and diet once appropriate.  As per our assessment, she does appear to be aspirating and has a poor cough.    Of note, looked at her PDMP and it appears that she is also on alprazolam 1 mg thrice daily and zolpidem 5 mg daily at bedtime that are being prescribed by her ?  Psychiatry APRN unclear.  Patient reports that she does not take zolpidem every day but has been on alprazolam for  over 20 years does not want to discontinue it.    Interval Problem Update  Patient's main problem/complaint this morning is pain all over her body.  She would like her prior dose of methadone to be prescribed but explained to her that we cannot do that before confirming with the clinic.      Explained that she is on very high doses of methadone, and taking it in addition to alprazolam thrice daily and zolpidem puts her at very high risks for overdosing which can be fatal, which is likely what happened this time albeit unintentionally as patient denies having any SI/HI.  She reports not taking zolpidem every day but per PDMP it is being prescribed regularly every month.  She does not want to go down on the methadone or discontinue her alprazolam.  States that she has been through withdrawal in the past and does not want to go through it again.    Pull out her NG tube today.  Discontinue tube feeds.  Waiting on SLP evaluation and resuming oral diet when able to safely consume.  On bedside evaluation, she has a weak cough and is definitely aspirating.  Will keep n.p.o. until speech clears her.      She has been on 10 to 12 L O2 via nasal cannula overnight.  During my evaluation, reduce the O2 to 6 L and her saturations were consistently above 92%.  This is likely a combination of atelectasis and aspiration.  X-ray shows increased bibasilar airspace disease.    I have discussed this patient's plan of care and discharge plan at IDT rounds today with Case Management, Nursing, Nursing leadership, and other members of the IDT team.    Consultants/Specialty  critical care    Code Status  Full Code    Disposition  The patient is not medically cleared for discharge to home or a post-acute facility.      I have placed the appropriate orders for post-discharge needs.    Review of Systems  Review of Systems   Constitutional:  Negative for chills and fever.   Respiratory:  Negative for cough and shortness of breath.     Cardiovascular:  Negative for chest pain, palpitations and leg swelling.   Gastrointestinal:  Negative for abdominal pain, diarrhea, nausea and vomiting.   Genitourinary:  Negative for dysuria and urgency.   Musculoskeletal:  Positive for joint pain and myalgias. Negative for back pain.   Skin:  Negative for rash.   Neurological:  Negative for dizziness, loss of consciousness and headaches.        Physical Exam  Temp:  [36.3 °C (97.3 °F)-37 °C (98.6 °F)] 36.7 °C (98.1 °F)  Pulse:  [80-95] 89  Resp:  [18-20] 18  BP: (130-172)/() 134/85  SpO2:  [89 %-96 %] 96 %    Physical Exam  Constitutional:       General: She is not in acute distress.     Appearance: Normal appearance. She is well-developed. She is cachectic. She is not diaphoretic.   HENT:      Head: Normocephalic and atraumatic.   Neck:      Vascular: No JVD.   Cardiovascular:      Rate and Rhythm: Normal rate and regular rhythm.      Heart sounds: No murmur heard.  Pulmonary:      Effort: Pulmonary effort is normal. No respiratory distress.      Breath sounds: No stridor. No wheezing or rales.   Abdominal:      Palpations: Abdomen is soft.      Tenderness: There is no abdominal tenderness. There is no guarding or rebound.   Musculoskeletal:      Right lower leg: No edema.      Left lower leg: No edema.   Skin:     General: Skin is warm and dry.      Findings: No rash.   Neurological:      Mental Status: She is oriented to person, place, and time.      Comments: Alert oriented and interactive  Face symmetric speech clear and logical  Antigravity x 4   Psychiatric:         Mood and Affect: Mood normal.         Behavior: Behavior normal.         Thought Content: Thought content normal.         Fluids    Intake/Output Summary (Last 24 hours) at 4/20/2025 1322  Last data filed at 4/20/2025 0800  Gross per 24 hour   Intake 778 ml   Output 0 ml   Net 778 ml       Laboratory  Recent Labs     04/18/25  0130 04/19/25  0440 04/20/25  0415   WBC 14.0* 13.8* 10.3    RBC 5.07 4.18* 5.20   HEMOGLOBIN 13.7 11.2* 13.6   HEMATOCRIT 41.4 34.8* 43.8   MCV 81.7 83.3 84.2   MCH 27.0 26.8* 26.2*   MCHC 33.1 32.2 31.1*   RDW 46.6 47.8 49.0   PLATELETCT 329 324 360   MPV 9.8 9.9 9.4     Recent Labs     04/18/25  0130 04/19/25  0440 04/20/25  0415   SODIUM 132* 135 137   POTASSIUM 3.6 4.5 4.2   CHLORIDE 99 101 97   CO2 20 24 27   GLUCOSE 129* 112* 149*   BUN 25* 32* 26*   CREATININE 1.09 0.94 0.92   CALCIUM 8.1* 7.9* 8.5             Recent Labs     04/17/25 2050   TRIGLYCERIDE 170*       Imaging  DX-CHEST-PORTABLE (1 VIEW)   Final Result      Increased bibasilar airspace disease.      US-RUQ   Final Result      1.  No acute abnormalities are noted on ultrasound right upper quadrant abdomen.      DX-ABDOMEN FOR TUBE PLACEMENT   Final Result      DHT tip projects over the mid to distal stomach      DX-CHEST-PORTABLE (1 VIEW)   Final Result         1.  Patchy right midlung infiltrates, stable since prior.   2.  Atherosclerosis      DX-CHEST-PORTABLE (1 VIEW)   Final Result         1.  Patchy right midlung infiltrates.      CT-CTA CHEST PULMONARY ARTERY W/ RECONS   Final Result         1.  No pulmonary embolus appreciated.   2.  Enlargement of the main pulmonary artery, consider pulmonary arterial hypertension.   3.  Patchy bilateral pulmonary infiltrates, greatest in the right lung base.   4.  Soft tissue density in the right tevin tracking along the airways, could represent adenopathy or mass, could be further evaluated with bronchoscopy.   5.  Atherosclerosis and atherosclerotic coronary artery disease.      CT-ABDOMEN-PELVIS WITH   Final Result         1.  Hepatomegaly.   2.  Low-density right hepatic lobe lesion, appearance favors hepatic cyst.   3.  Common bile duct dilatation, consider causes of biliary obstruction with additional workup as clinically appropriate.   4.  Atherosclerosis      Note: There is severe metallic artifacts from orthopedic hardware which severely limits  "diagnostic sensitivity of this study.      DX-ABDOMEN FOR TUBE PLACEMENT   Final Result      NG tube tip projects within the stomach.      CT-HEAD W/O   Final Result      No acute intracranial abnormality.         A new left parietal lytic calvarial lesion concerning for malignancy.                        DX-CHEST-PORTABLE (1 VIEW)   Final Result      1.  Mild interstitial opacities could be related to edema or infection.   2.  Asymmetric right basilar opacity could be atelectasis or pneumonitis.   3.  Trace/small right pleural effusion.   4.  NG tube tip is obscured but probably at the gastroesophageal junction. Recommend advancement.           Assessment/Plan  Problem Representation:    * Respiratory failure with hypoxia (HCC)- (present on admission)  Assessment & Plan  Intubated for airway protection 4/17 and extubated 4/19.  Requiring 10 to 12 L O2 via nasal cannula while on the floor overnight on 4/20.  Able to wean down to 6 L with saturations above 92% during my evaluation on 4/20.  Likely a combination of aspiration and atelectasis.  Continue Unasyn for aspiration pneumonia, DuoNebs  Incentive spirometer/flutter valve  O2/RT protocols  Mobilize    Liver injury  Assessment & Plan  Acute ischemic hepatitis from when she was found down prior to hospitalization.  LFTs downtrending    Chronic prescription opiate use  Assessment & Plan  Pt takes 55mg of methadone daily by her report: unable to confirm on a weekend as the clinic she goes to is closed  Giving 20mg for now   Prn oxycodone    Mass of right lung  Assessment & Plan  CT thorax this admission showed \" Soft tissue density in the right tevin tracking along the airways, could represent adenopathy or mass, could be further evaluated with bronchoscopy. \"  Also noted to have cranial lytic lestion  DW pt and her friend at bedside by Dr. Dedrick Hubbard  Needs outpatient pulmonology referral    CAP (community acquired pneumonia)  Assessment & Plan  Very likely " aspiration pneumonia.  Received 3 days of azithromycin.  Also on Unasyn since 4/18.  Plan to treat 5 days of antibiotics.  SLP following, diet when safely able to swallow.  Pulled out her NG tube on 4/20.  Hence discontinued tube feeds until SLP evaluates her.  Ideally would prefer that she starts oral diet instead of tube feeds which would also increase her risk of aspiration.  High risk aspiration precautions    PTSD (post-traumatic stress disorder)  Assessment & Plan  On Celexa and alprazolam 1 mg 3 times daily, and zolpidem 5 mg at bedtime per PDMP.  She needs to be weaned off the benzodiazepines especially while on very high doses of methadone as this is not going to increase her risk of overdose and death.    Stage 3 chronic kidney disease  Assessment & Plan  Creat runs around 1.0  Her degree of cachexia makes her degree of renal failure underestimated  Monitor daily BMP  Renally dose medications as appropriate    Malnutrition (HCC)  Assessment & Plan  Severe protein calorie malnutrition  Tube fluids were discontinued on 4/20/2025 as she pulled out her NG tube.  SLP evaluation pending.  Nurtion consulted    Elevated brain natriuretic peptide (BNP) level  Assessment & Plan  Clinically is not in heart failure  Does have findings indicative of pulmonary HTN on CT chest would explain elevated BNP    Elevated troponin  Assessment & Plan  Type II event  EKG which I have reviewed does not indicate acute ischemia though limited by artifact  Cont Tele    Sepsis (HCC)  Assessment & Plan  Resp source  Plan 5 days of amp/sulbactam    AMS (altered mental status)  Assessment & Plan  Concerned that she had unintentional overdose on methadone +/- benzodiazepine.  She reports being on 55 mg methadone once daily and PDMP reveals that she is also on alprazolam 1 mg 3 times daily and zolpidem 5 mg at bedtime.  As per patient's report, she is on 258.5 MME per day which is 8.9X high risk of overdose, especially in the setting of  benzodiazepine dependence.   Pt is now nearing her baseline mental status.  Her methadone dose of 55 mg daily needs to be confirmed on Monday once the clinic is open.  For now continue 20 mg dose started this admission.  As needed oxycodone 5 to 10 mg for pain that is not controlled by methadone.  No IV opioids.  She needs to be weaned off her outpatient benzodiazepines as well.    Monitor and limit as many centrally acting medications as possible    Other specified anemias- (present on admission)  Assessment & Plan  Resolved, hemoglobin 13.6 on 4/20/2025.  No evidence of ongoign GIB.     Tobacco use disorder- (present on admission)  Assessment & Plan  Prn replacement  Encourage cessation           VTE prophylaxis: SCDs/TEDs and enoxaparin ppx    I have performed a physical exam and reviewed and updated ROS and Plan today (4/20/2025). In review of yesterday's note (4/19/2025), there are no changes except as documented above.

## 2025-04-20 NOTE — ASSESSMENT & PLAN NOTE
On Celexa and alprazolam 1 mg 3 times daily, and zolpidem 5 mg at bedtime per PDMP.  She needs to be weaned off the benzodiazepines especially while on very high doses of methadone as this is not going to increase her risk of overdose and death.

## 2025-04-20 NOTE — CARE PLAN
The patient is Stable - Low risk of patient condition declining or worsening    Shift Goals  Clinical Goals: safety, pain management  Patient Goals: pain control, rest  Family Goals: KELLEE    Progress made toward(s) clinical / shift goals:        Problem: Care Map:  Day 1 Optimal Outcome for the Heart Failure Patient  Goal: Day 1:  Optimal Care of the heart failure patient  Description: Target End Date:  end of day 1  Outcome: Progressing  Note: Intake and output are being monitored on this pt. Daily weights are being done. Pt is currently NPO and therefore, her intake is low.

## 2025-04-20 NOTE — THERAPY
04/20/25 1253   Interdisciplinary Plan of Care Collaboration   Collaboration Comments PT alan attempted this PM.  Pt politely declined 2/2 fatigue

## 2025-04-21 LAB
ALBUMIN SERPL BCP-MCNC: 2.7 G/DL (ref 3.2–4.9)
ALBUMIN/GLOB SERPL: 0.5 G/DL
ALP SERPL-CCNC: 144 U/L (ref 30–99)
ALT SERPL-CCNC: 144 U/L (ref 2–50)
ANION GAP SERPL CALC-SCNC: 11 MMOL/L (ref 7–16)
AST SERPL-CCNC: 87 U/L (ref 12–45)
BILIRUB SERPL-MCNC: 0.5 MG/DL (ref 0.1–1.5)
BUN SERPL-MCNC: 26 MG/DL (ref 8–22)
CALCIUM ALBUM COR SERPL-MCNC: 9.7 MG/DL (ref 8.5–10.5)
CALCIUM SERPL-MCNC: 8.7 MG/DL (ref 8.5–10.5)
CHLORIDE SERPL-SCNC: 98 MMOL/L (ref 96–112)
CO2 SERPL-SCNC: 28 MMOL/L (ref 20–33)
CREAT SERPL-MCNC: 0.85 MG/DL (ref 0.5–1.4)
ERYTHROCYTE [DISTWIDTH] IN BLOOD BY AUTOMATED COUNT: 48.9 FL (ref 35.9–50)
GFR SERPLBLD CREATININE-BSD FMLA CKD-EPI: 72 ML/MIN/1.73 M 2
GLOBULIN SER CALC-MCNC: 5.5 G/DL (ref 1.9–3.5)
GLUCOSE SERPL-MCNC: 79 MG/DL (ref 65–99)
HCT VFR BLD AUTO: 39.8 % (ref 37–47)
HGB BLD-MCNC: 12.5 G/DL (ref 12–16)
MAGNESIUM SERPL-MCNC: 2.3 MG/DL (ref 1.5–2.5)
MCH RBC QN AUTO: 26.5 PG (ref 27–33)
MCHC RBC AUTO-ENTMCNC: 31.4 G/DL (ref 32.2–35.5)
MCV RBC AUTO: 84.3 FL (ref 81.4–97.8)
PHOSPHATE SERPL-MCNC: 4 MG/DL (ref 2.5–4.5)
PLATELET # BLD AUTO: 384 K/UL (ref 164–446)
PMV BLD AUTO: 9.4 FL (ref 9–12.9)
POTASSIUM SERPL-SCNC: 4.4 MMOL/L (ref 3.6–5.5)
PROT SERPL-MCNC: 8.2 G/DL (ref 6–8.2)
RBC # BLD AUTO: 4.72 M/UL (ref 4.2–5.4)
SODIUM SERPL-SCNC: 137 MMOL/L (ref 135–145)
WBC # BLD AUTO: 9.8 K/UL (ref 4.8–10.8)

## 2025-04-21 PROCEDURE — A9270 NON-COVERED ITEM OR SERVICE: HCPCS

## 2025-04-21 PROCEDURE — 700111 HCHG RX REV CODE 636 W/ 250 OVERRIDE (IP): Performed by: INTERNAL MEDICINE

## 2025-04-21 PROCEDURE — 80053 COMPREHEN METABOLIC PANEL: CPT

## 2025-04-21 PROCEDURE — 700111 HCHG RX REV CODE 636 W/ 250 OVERRIDE (IP): Mod: JZ | Performed by: STUDENT IN AN ORGANIZED HEALTH CARE EDUCATION/TRAINING PROGRAM

## 2025-04-21 PROCEDURE — 36415 COLL VENOUS BLD VENIPUNCTURE: CPT

## 2025-04-21 PROCEDURE — 84100 ASSAY OF PHOSPHORUS: CPT

## 2025-04-21 PROCEDURE — 83735 ASSAY OF MAGNESIUM: CPT

## 2025-04-21 PROCEDURE — 700102 HCHG RX REV CODE 250 W/ 637 OVERRIDE(OP)

## 2025-04-21 PROCEDURE — 770001 HCHG ROOM/CARE - MED/SURG/GYN PRIV*

## 2025-04-21 PROCEDURE — A9270 NON-COVERED ITEM OR SERVICE: HCPCS | Performed by: STUDENT IN AN ORGANIZED HEALTH CARE EDUCATION/TRAINING PROGRAM

## 2025-04-21 PROCEDURE — 92612 ENDOSCOPY SWALLOW (FEES) VID: CPT

## 2025-04-21 PROCEDURE — 700105 HCHG RX REV CODE 258: Performed by: STUDENT IN AN ORGANIZED HEALTH CARE EDUCATION/TRAINING PROGRAM

## 2025-04-21 PROCEDURE — 85027 COMPLETE CBC AUTOMATED: CPT

## 2025-04-21 PROCEDURE — 99232 SBSQ HOSP IP/OBS MODERATE 35: CPT | Mod: GC | Performed by: STUDENT IN AN ORGANIZED HEALTH CARE EDUCATION/TRAINING PROGRAM

## 2025-04-21 PROCEDURE — 700102 HCHG RX REV CODE 250 W/ 637 OVERRIDE(OP): Performed by: STUDENT IN AN ORGANIZED HEALTH CARE EDUCATION/TRAINING PROGRAM

## 2025-04-21 RX ORDER — METHADONE HYDROCHLORIDE 10 MG/ML
55 CONCENTRATE ORAL DAILY
Status: ON HOLD | COMMUNITY
End: 2025-04-22

## 2025-04-21 RX ADMIN — ENOXAPARIN SODIUM 30 MG: 100 INJECTION SUBCUTANEOUS at 18:09

## 2025-04-21 RX ADMIN — FUROSEMIDE 40 MG: 10 INJECTION, SOLUTION INTRAVENOUS at 08:42

## 2025-04-21 RX ADMIN — AMPICILLIN SODIUM, SULBACTAM SODIUM 3 G: 2; 1 INJECTION, POWDER, FOR SOLUTION INTRAMUSCULAR; INTRAVENOUS at 12:17

## 2025-04-21 RX ADMIN — ACETAMINOPHEN 1000 MG: 500 TABLET, FILM COATED ORAL at 21:42

## 2025-04-21 RX ADMIN — METHADONE HYDROCHLORIDE 20 MG: 10 TABLET ORAL at 06:05

## 2025-04-21 RX ADMIN — ACETAMINOPHEN 1000 MG: 500 TABLET, FILM COATED ORAL at 04:25

## 2025-04-21 RX ADMIN — SENNOSIDES AND DOCUSATE SODIUM 2 TABLET: 50; 8.6 TABLET ORAL at 04:25

## 2025-04-21 RX ADMIN — AMPICILLIN SODIUM, SULBACTAM SODIUM 3 G: 2; 1 INJECTION, POWDER, FOR SOLUTION INTRAMUSCULAR; INTRAVENOUS at 06:05

## 2025-04-21 RX ADMIN — AMPICILLIN SODIUM, SULBACTAM SODIUM 3 G: 2; 1 INJECTION, POWDER, FOR SOLUTION INTRAMUSCULAR; INTRAVENOUS at 00:14

## 2025-04-21 RX ADMIN — ACETAMINOPHEN 1000 MG: 500 TABLET, FILM COATED ORAL at 14:29

## 2025-04-21 RX ADMIN — CITALOPRAM HYDROBROMIDE 10 MG: 20 TABLET ORAL at 06:05

## 2025-04-21 RX ADMIN — Medication 1 APPLICATOR: at 04:26

## 2025-04-21 RX ADMIN — NICOTINE 14 MG: 14 PATCH TRANSDERMAL at 04:26

## 2025-04-21 RX ADMIN — SENNOSIDES AND DOCUSATE SODIUM 2 TABLET: 50; 8.6 TABLET ORAL at 18:09

## 2025-04-21 RX ADMIN — OXYCODONE HYDROCHLORIDE 5 MG: 5 TABLET ORAL at 18:09

## 2025-04-21 RX ADMIN — AMOXICILLIN AND CLAVULANATE POTASSIUM 1 TABLET: 875; 125 TABLET, FILM COATED ORAL at 18:08

## 2025-04-21 RX ADMIN — OXYCODONE HYDROCHLORIDE 5 MG: 5 TABLET ORAL at 08:53

## 2025-04-21 ASSESSMENT — ENCOUNTER SYMPTOMS
BACK PAIN: 0
COUGH: 0
FEVER: 0
CHILLS: 0
VOMITING: 0
MYALGIAS: 1
ABDOMINAL PAIN: 0
DIZZINESS: 0
HEADACHES: 0
LOSS OF CONSCIOUSNESS: 0
SHORTNESS OF BREATH: 0
PALPITATIONS: 0
NAUSEA: 0
DIARRHEA: 0

## 2025-04-21 ASSESSMENT — PAIN DESCRIPTION - PAIN TYPE
TYPE: CHRONIC PAIN

## 2025-04-21 ASSESSMENT — FIBROSIS 4 INDEX: FIB4 SCORE: 1.36

## 2025-04-21 NOTE — DISCHARGE PLANNING
"Case Management Discharge Planning    Admission Date: 4/17/2025  GMLOS: 4.9  ALOS: 4    6-Clicks ADL Score: 12  6-Clicks Mobility Score: 12  PT and/or OT Eval ordered: Yes  Post-acute Referrals Ordered: Yes  Post-acute Choice Obtained: Yes  Has referral(s) been sent to post-acute provider:  Yes      Anticipated Discharge Dispo: Discharge Disposition: D/T to home under [facility] care in anticipation of covered skilled care (06)    DME Needed: No    Action(s) Taken: Updated Provider/Nurse on Discharge Plan and Choice obtainedPatient discussed during IDT rounds with medical team. Choice form for SNF provided, refusing to go to a SNF, states \"I have pets at home I have to take care of\" explained risks of falling and the SNF will help you get stronger. Verbalized understanding, Continues to refuse.    Escalations Completed: Insurance     Medically Clear: Yes    Next Steps: Case Management will continue to follow for discharge planning needs.    Barriers to Discharge: Pending Insurance Authorization    Is the patient up for discharge tomorrow: No        "

## 2025-04-21 NOTE — CARE PLAN
The patient is Stable - Low risk of patient condition declining or worsening    Shift Goals  Clinical Goals: Monitor labs/VS, PT/OT, ambulation as tolerated  Patient Goals: To get better and go home  Family Goals: NA    Progress made toward(s) clinical / shift goals:      Problem: Hemodynamics  Goal: Patient's hemodynamics, fluid balance and neurologic status will be stable or improve  Description: Target End Date:  Prior to discharge or change in level of careDocument on Assessment and I/O flowsheet templates1.  Monitor vital signs, pulse oximetry and cardiac monitor per provider order and/or policy2.  Maintain blood pressure per provider order3.  Hemodynamic monitoring per provider order4.  Manage IV fluids and IV infusions5.  Monitor intake and output6.  Daily weights per unit policy or provider order7.  Assess peripheral pulses and capillary refill8.  Assess color and body temperature9.  Position patient for maximum circulation/cardiac [id number]. Monitor for signs/symptoms of excessive [id number]. Assess mental status, restlessness and changes in level of [id number]. Monitor temperature and report fever or hypothermia to provider immediately. Consideration of targeted temperature management.  Outcome: Progressing       Patient is not progressing towards the following goals:      Problem: Knowledge Deficit - Standard  Goal: Patient and family/care givers will demonstrate understanding of plan of care, disease process/condition, diagnostic tests and medications  Description: Target End Date:  1-3 days or as soon as patient condition allowsDocument in Patient Education1.  Patient and family/caregiver oriented to unit, equipment, visitation policy and means for communicating concern2.  Complete/review Learning Assessment3.  Assess knowledge level of disease process/condition, treatment plan, diagnostic tests and medications4.  Explain disease process/condition, treatment plan, diagnostic tests and  medications  Outcome: Not Progressing

## 2025-04-21 NOTE — THERAPY
Physical Therapy Contact Note    Per OT patient is refusing all OOB activity today. Will re attempt PT evaluation as able and appropriate.     Rossana Nair, PT, DPT  [phone]

## 2025-04-21 NOTE — THERAPY
Occupational Therapy Contact Note    Patient Name: Manisha Rincon  Age:  74 y.o., Sex:  female  Medical Record #: [id number]  Today's Date: 4/21/2025    Discussed missed therapy with RN     04/21/25 0945   Time In/Time Out   Therapy Start Time 0940   Therapy End Time 0945   Total Therapy Time 5   Initial Contact Note    Initial Contact Note Order Received and Verified, Occupational Therapy Evaluation in Progress with Full Report to Follow.   Interdisciplinary Plan of Care Collaboration   IDT Collaboration with  Nursing   Collaboration Comments OT consult received and chart reviewed. Attempted to see pt for OT eval. Pt adamantly declining EOB/OOB activity today with report of being in too much pain. Requesting OT return another day. Will hold OT eval and re-attempt as appropriate/able.   Session Information   Date / Session Number  4/21 Attempted - needs eval

## 2025-04-21 NOTE — PROGRESS NOTES
[facility] Internal Medicine Daily Progress Note    Date of Service  4/21/2025    [facility] Team: R RUBÉN [place] Team   Attending: Gordo Fritz M.d.  Senior Resident: Dr. Camarena  Intern:  Dr. Monroe   Contact Number: [phone]    Chief Complaint  Manisha Rincon is a 74 y.o. female admitted 4/17/2025 with altered mentation, found down by friend.    Hospital Course  74-year-old female with PMHx of anxiety, opioid dependence/chronic pain on methadone, benzodiazepine dependence, HFpEF, and PE who was brought into the hospital by EMS on 4/17 after a friend found her down on the floor.  There were reports of empty methadone and antibiotic pill bottles next to the patient.  Last well-known was around 10:30 PM on 4/16.  Per EMS, GCS was 6 initially, improved to 10 after Narcan.  She was intubated emergently in the ED due to hypoxia and altered mentation/unable to protect airways.  She was extubated successfully 4/19/2025 and moved to the floor.  She is being treated empirically for aspiration pneumonia with Unasyn (completed 3 days of azithromycin in the ICU).     Patient reports that she takes 55 mg of methadone daily per her report but we have been unable to confirm with the clinic that she typically goes to (closed over the weekend).  She was started on 20 mg methadone while inpatient, with as needed oxycodone for additional pain management.    She was initially started on tube feeds while in the ICU while intubated.  However she pulled out her NG tube on 4/20/2025, hence tube feeds were discontinued.  She is pending SLP evaluation and diet once appropriate.  As per our assessment, she does appear to be aspirating and has a poor cough.    Of note, looked at her PDMP and it appears that she is also on alprazolam 1 mg thrice daily and zolpidem 5 mg daily at bedtime that are being prescribed by her ?  Psychiatry APRN unclear.  Patient reports that she does not take zolpidem every day but has been on alprazolam for over 20 years  does not want to discontinue it.    Interval Problem Update  SLP to perform fees today.  - Patient refuses rehab.  Refused to work with PT and OT.  - Discontinue telemetry.  -Methadone dose verified.  - SLP recommends postacute placement for additional speech therapy prior to discharge to home.    I have discussed this patient's plan of care and discharge plan at IDT rounds today with Case Management, Nursing, Nursing leadership, and other members of the IDT team.    Consultants/Specialty  critical care    Code Status  Full Code    Disposition  The patient is not medically cleared for discharge to home or a post-acute facility.      I have placed the appropriate orders for post-discharge needs.    Review of Systems  Review of Systems   Constitutional:  Negative for chills and fever.   Respiratory:  Negative for cough and shortness of breath.    Cardiovascular:  Negative for chest pain, palpitations and leg swelling.   Gastrointestinal:  Negative for abdominal pain, diarrhea, nausea and vomiting.   Genitourinary:  Negative for dysuria and urgency.   Musculoskeletal:  Positive for joint pain and myalgias. Negative for back pain.   Skin:  Negative for rash.   Neurological:  Negative for dizziness, loss of consciousness and headaches.        Physical Exam  Temp:  [36.4 °C (97.5 °F)-37.2 °C (99 °F)] 36.4 °C (97.5 °F)  Pulse:  [62-81] 62  Resp:  [18] 18  BP: (127-146)/(68-86) 127/84  SpO2:  [90 %-94 %] 91 %    Physical Exam  Constitutional:       General: She is not in acute distress.     Appearance: Normal appearance. She is well-developed. She is cachectic. She is not diaphoretic.   HENT:      Head: Normocephalic and atraumatic.   Neck:      Vascular: No JVD.   Cardiovascular:      Rate and Rhythm: Normal rate and regular rhythm.      Heart sounds: No murmur heard.  Pulmonary:      Effort: Pulmonary effort is normal. No respiratory distress.      Breath sounds: No stridor. No wheezing or rales.   Abdominal:       Palpations: Abdomen is soft.      Tenderness: There is no abdominal tenderness. There is no guarding or rebound.   Musculoskeletal:      Right lower leg: No edema.      Left lower leg: No edema.   Skin:     General: Skin is warm and dry.      Findings: No rash.   Neurological:      Mental Status: She is oriented to person, place, and time.      Comments: Alert oriented and interactive  Face symmetric speech clear and logical  Antigravity x 4   Psychiatric:         Mood and Affect: Mood normal.         Behavior: Behavior normal.         Thought Content: Thought content normal.         Fluids    Intake/Output Summary (Last 24 hours) at 4/21/2025 1605  Last data filed at 4/21/2025 1300  Gross per 24 hour   Intake 100 ml   Output 275 ml   Net -175 ml       Laboratory  Recent Labs     04/20/25  0415 04/20/25  1436 04/21/25  0159   WBC 10.3 10.9* 9.8   RBC 5.20 4.99 4.72   HEMOGLOBIN 13.6 13.1 12.5   HEMATOCRIT 43.8 42.0 39.8   MCV 84.2 84.2 84.3   MCH 26.2* 26.3* 26.5*   MCHC 31.1* 31.2* 31.4*   RDW 49.0 49.0 48.9   PLATELETCT 360 404 384   MPV 9.4 9.4 9.4     Recent Labs     04/19/25  0440 04/20/25  0415 04/21/25  0159   SODIUM 135 137 137   POTASSIUM 4.5 4.2 4.4   CHLORIDE 101 97 98   CO2 24 27 28   GLUCOSE 112* 149* 79   BUN 32* 26* 26*   CREATININE 0.94 0.92 0.85   CALCIUM 7.9* 8.5 8.7                     Imaging  DX-CHEST-PORTABLE (1 VIEW)   Final Result      Increased bibasilar airspace disease.      US-RUQ   Final Result      1.  No acute abnormalities are noted on ultrasound right upper quadrant abdomen.      DX-ABDOMEN FOR TUBE PLACEMENT   Final Result      DHT tip projects over the mid to distal stomach      DX-CHEST-PORTABLE (1 VIEW)   Final Result         1.  Patchy right midlung infiltrates, stable since prior.   2.  Atherosclerosis      DX-CHEST-PORTABLE (1 VIEW)   Final Result         1.  Patchy right midlung infiltrates.      CT-CTA CHEST PULMONARY ARTERY W/ RECONS   Final Result         1.  No pulmonary  embolus appreciated.   2.  Enlargement of the main pulmonary artery, consider pulmonary arterial hypertension.   3.  Patchy bilateral pulmonary infiltrates, greatest in the right lung base.   4.  Soft tissue density in the right tevin tracking along the airways, could represent adenopathy or mass, could be further evaluated with bronchoscopy.   5.  Atherosclerosis and atherosclerotic coronary artery disease.      CT-ABDOMEN-PELVIS WITH   Final Result         1.  Hepatomegaly.   2.  Low-density right hepatic lobe lesion, appearance favors hepatic cyst.   3.  Common bile duct dilatation, consider causes of biliary obstruction with additional workup as clinically appropriate.   4.  Atherosclerosis      Note: There is severe metallic artifacts from orthopedic hardware which severely limits diagnostic sensitivity of this study.      DX-ABDOMEN FOR TUBE PLACEMENT   Final Result      NG tube tip projects within the stomach.      CT-HEAD W/O   Final Result      No acute intracranial abnormality.         A new left parietal lytic calvarial lesion concerning for malignancy.                        DX-CHEST-PORTABLE (1 VIEW)   Final Result      1.  Mild interstitial opacities could be related to edema or infection.   2.  Asymmetric right basilar opacity could be atelectasis or pneumonitis.   3.  Trace/small right pleural effusion.   4.  NG tube tip is obscured but probably at the gastroesophageal junction. Recommend advancement.           Assessment/Plan  Problem Representation:    * Respiratory failure with hypoxia (HCC)- (present on admission)  Assessment & Plan  Intubated for airway protection 4/17 and extubated 4/19.  Requiring 10 to 12 L O2 via nasal cannula while on the floor overnight on 4/20.  Able to wean down to 6 L with saturations above 92% during my evaluation on 4/20.  Likely a combination of aspiration and atelectasis.  Continue Unasyn for aspiration pneumonia, DuoNebs  Incentive spirometer/flutter valve  O2/RT  protocols  Mobilize    CAP (community acquired pneumonia)  Assessment & Plan  Very likely aspiration pneumonia.  Received 3 days of azithromycin.  Also on Unasyn since 4/18.  Plan to treat 5 days of antibiotics.  SLP following, diet when safely able to swallow.  Pulled out her NG tube on 4/20.  Hence discontinued tube feeds until SLP evaluates her.  Ideally would prefer that she starts oral diet instead of tube feeds which would also increase her risk of aspiration.  High risk aspiration precautions    AMS (altered mental status)  Assessment & Plan  Concerned that she had unintentional overdose on methadone +/- benzodiazepine.  She reports being on 55 mg methadone once daily and PDMP reveals that she is also on alprazolam 1 mg 3 times daily and zolpidem 5 mg at bedtime.  As per patient's report, she is on 258.5 MME per day which is 8.9X high risk of overdose, especially in the setting of benzodiazepine dependence.   Pt is now nearing her baseline mental status.  Her methadone dose of 55 mg daily needs to be confirmed on Monday once the clinic is open.  For now continue 20 mg dose started this admission.  As needed oxycodone 5 to 10 mg for pain that is not controlled by methadone.  No IV opioids.  She needs to be weaned off her outpatient benzodiazepines as well.    Monitor and limit as many centrally acting medications as possible    Chronic prescription opiate use  Assessment & Plan  Pt takes 55mg of methadone daily by her report: unable to confirm on a weekend as the clinic she goes to is closed  Giving 20mg for now   Prn oxycodone    Stage 3 chronic kidney disease  Assessment & Plan  Creat runs around 1.0  Her degree of cachexia makes her degree of renal failure underestimated  Monitor daily BMP  Renally dose medications as appropriate    Liver injury  Assessment & Plan  Acute ischemic hepatitis from when she was found down prior to hospitalization.  LFTs downtrending    Mass of right lung  Assessment & Plan  CT  "thorax this admission showed \" Soft tissue density in the right tevin tracking along the airways, could represent adenopathy or mass, could be further evaluated with bronchoscopy. \"  Also noted to have cranial lytic lestion  DW pt and her friend at bedside by Dr. Dedrick Hubbard  Needs outpatient pulmonology referral    PTSD (post-traumatic stress disorder)  Assessment & Plan  On Celexa and alprazolam 1 mg 3 times daily, and zolpidem 5 mg at bedtime per PDMP.  She needs to be weaned off the benzodiazepines especially while on very high doses of methadone as this is not going to increase her risk of overdose and death.    Malnutrition (HCC)  Assessment & Plan  Severe protein calorie malnutrition  Tube fluids were discontinued on 4/20/2025 as she pulled out her NG tube.  SLP evaluation pending.  Nurtion consulted    Elevated brain natriuretic peptide (BNP) level  Assessment & Plan  Clinically is not in heart failure  Does have findings indicative of pulmonary HTN on CT chest would explain elevated BNP    Elevated troponin  Assessment & Plan  Type II event  EKG which I have reviewed does not indicate acute ischemia though limited by artifact  Cont Tele    Sepsis (HCC)  Assessment & Plan  Resp source  Plan 5 days of amp/sulbactam    Other specified anemias- (present on admission)  Assessment & Plan  Resolved, hemoglobin 13.6 on 4/20/2025.  No evidence of ongoign GIB.     Tobacco use disorder- (present on admission)  Assessment & Plan  Prn replacement  Encourage cessation           VTE prophylaxis: SCDs/TEDs and enoxaparin ppx    I have performed a physical exam and reviewed and updated ROS and Plan today (4/21/2025). In review of yesterday's note (4/20/2025), there are no changes except as documented above.          "

## 2025-04-21 NOTE — DOCUMENTATION QUERY
"                                                                         [facility]                                                                       Query Response Note      PATIENT:               AYALA CALI  ACCT #:                  [id number]  MRN:                     [id number]  :                      1950  ADMIT DATE:       2025 11:14 AM  DISCH DATE:          RESPONDING  PROVIDER #:        [id number]           QUERY TEXT:    Documentation in the medical record indicates that this patient is being treated for ulcer of the following site:     Wound Team: \"Pressure Injury Ischium Right POA DTI?    Can the diagnosis of ulcer be further clarified as to type/etiology of the wound, location, and if present on admission (POA) based on the clinical indicators and treatment?    The patient's Clinical Indicators include:   Wound Team: \"Pressure Injury Ischium Right POA DTI ... Present on Original Admission: Yes ... Pressure Injury Stage: Deep Tissue ... admitted with a non-blanching DTI to her right ischium (IT).?    Risk Factors: 74 y.o. female with stool incontinence, AMS, malnutrition, BMI 15.48, CHF, chronic pain, admitted .  Treatment: Wound care consult, barrier paste, offloading, turn q2h, q shift pressure point assessments.    Thank you,  ANKITA Chavez, RN, CCDS, CCS  Clinical    Connect via [email or web address] or [email or web address]  Options provided:   -- Deep tissue pressure injury on right ischium present on admission   -- Wound is not a Pressure Ulcer/DTI, (please specify type of wound)   -- Other explanation, (please specify other explanation)      Query created by: Lu Pack on 2025 4:40 AM    RESPONSE TEXT:    Deep tissue pressure injury on right ischium present on admission          Electronically signed by:  NORMA CHAVEZ MD 2025 3:23 PM              "

## 2025-04-21 NOTE — CARE PLAN
The patient is Stable - Low risk of patient condition declining or worsening    Shift Goals  Clinical Goals: wean O2, monitor VS, LFTs, NPO  Patient Goals: rest  Family Goals: KELLEE    Progress made toward(s) clinical / shift goals:    Problem: Respiratory  Goal: Patient will achieve/maintain optimum respiratory ventilation and gas exchange  Description: Target End Date:  Prior to discharge or change in level of careDocument on Assessment flowsheet1.  Assess and monitor rate, rhythm, depth and effort of respiration2.  Breath sounds assessed qshift and/or as needed3.  Assess O2 saturation, administer/titrate oxygen as ordered4.  Position patient for maximum ventilatory efficiency5.  Turn, cough, and deep breath with splinting to improve effectiveness6.  Collaborate with RT to administer medication/treatments per order7.  Encourage use of incentive spirometer and encourage patient to cough after use and utilize splinting techniques if applicable8.  Airway suctioning9.  Monitor sputum production for changes in color, consistency and [id number]. Perform frequent oral [id number]. Alternate physical activity with rest periods  Outcome: Progressing  Note: Pt still on 1.5 L O2 via NC. IS max. 450 mL, needs reinforcement. On abx for aspiration PNA.      Problem: Skin Integrity  Goal: Skin integrity is maintained or improved  Description: Target End Date:  Prior to discharge or change in level of careDocument interventions on Skin Risk/Pedro flowsheet groups and corresponding LDA1.  Assess and monitor skin integrity, appearance and/or temperature2.  Assess risk factors for impaired skin integrity and/or pressures ulcers3.  Implement precautions to protect skin integrity in collaboration with interdisciplinary team4.  Implement pressure ulcer prevention protocol if at risk for skin breakdown5.  Confirm wound care consult if at risk for skin breakdown6.  Ensure patient use of pressure relieving devices  (Low air loss bed, waffle  overlay, heel protectors, ROHO cushion, etc)  Outcome: Progressing  Note: Pt on Q2 hour turns, on low airloss mattress, heel mepilex and moon boots in place. Elbow mepilex in place. Barrier paste applied, purewick in place.        Patient is not progressing towards the following goals:      Problem: Fluid Volume  Goal: Fluid volume balance will be maintained  Description: Target End Date:  Prior to discharge or change in level of careDocument on I/O flowsheet1.  Monitor intake and output as ordered2.  Promote oral intake as appropriate3.  Report inadequate intake or output to physician4.  Administer IV therapy as ordered5.  Weights per provider order6.  Assess for signs and symptoms of bleeding7.  Monitor for signs of fluid overload (respiratory changes, edema, weight gain, increased abdominal girth)8.  Monitor of signs for inadequate fluid volume (poor skin turgor, dry mucous membranes)9.  Instruct patient on adherence to fluid restrictions  Outcome: Not Progressing   Pt very thirsty, NPO/sips with meds. Pt asking repeatedly for fresh ice chips throughout the night.

## 2025-04-21 NOTE — THERAPY
Speech Language Pathology   Flexible Endoscopic Evaluation of Swallowing (FEES)        Patient Name: Manisha Rincon  AGE:  74 y.o., SEX:  female  Medical Record #: [id number]  Date of Service: 4/21/2025      History of Present Illness  74 y.o. female admitted 4/17/2025 with encephalopathy, hypoxia, possible overdose. Chest imaging concerning for PNA. Intubated 4/17-2/19.     PMHx: anxiety, CHF, anemia, PE cefepime induced encephalopthy, PTSD, Stage 3 chronic kidney disease      Pertinent Information  Current Method of Nutrition: NPO until cleared by speech pathology  Patient Behaviors: Fatigue, Irritable   Dentition: Full dentures   Feeding Tube: None   Tracheostomy: No   Factor(s) Affecting Performance: Impaired endurance     Discussed the risks, benefits, and alternatives of the FEES procedure. Patient/family acknowledged and agreed to proceed.      Assessment  Flexible Endoscopic Evaluation of Swallowing (FEES) completed at bedside today. The endoscope was passed transnasally via Right nare to evaluate the anatomy and physiology of swallowing. Pt tolerated the procedure with no apparent distress.    Anatomic Findings: Edema of the arytenoids and laryngeal vestibule (R > L); space occupying lesions along trachea (see picture), query if related to intubation    Vocal Fold Motion: Bilateral movement  Secretion Management: Secretions visible in trachea  PO Trials: Ice Chips, Thin Liquid, Mildly Thick Liquid, Liquidised, Pudding, Mixed      Consistency PAS Score Timing Residue Comments   Thin Liquid 8 Pre Swallow, During swallow, Post swallow Vallecular Residue: Mild (5%-25%)  Pyriform Sinus Residue: Mild (5%-25%) Ice - PAS 8 before x2  Tsp - PAS 8  Cup - PAS 6, PAS 7, PAS 8 before and during  Cup w 3-sec prep - PAS 1; PAS 3 after   Mildly Thick 5 During Swallow, Post Swallow Vallecular Residue: Moderate (25%-50%)  Pyriform Sinus Residue: Mild (5%-25%) Cup x4 - PAS 1  Straw - PAS 2  Rapid, sequential straw - PAS 5  (trace)   Liquidised 3 During Swallow Vallecular Residue: Moderate (25%-50%)  Pyriform Sinus Residue: Mild (5%-25%) PAS 1, PAS 3   Pudding 1 N/A Vallecular Residue: Moderate (25%-50%)  Pyriform Sinus Residue: Mild (5%-25%) x2   Mixed 7 Post Swallow, During Swallow Vallecular Residue: Severe (>50%)  Pyriform Sinus Residue: Mild (5%-25%) SB6 - PAS 1 x2, severe vallecular stasis  TN0 - PAS 4, PAS 7 during and after     Penetration-Aspiration Scale (PAS)  1     No contrast enters airway  2     Contrast enters the airway, remains above the vocal folds, and is ejected from the airway (not seen in the airway at the end of the swallow).  3     Contrast enters the airway, remains above the vocal folds, and is not ejected from the airway (is seen in the airway after the swallow).  4     Contrast enters the airway, contacts the vocal folds, and is ejected from the airway.  5     Contrast enters the airway, contacts the vocal folds, and is not ejected from the airway  6     Contrast enters the airway, crosses the plane of the vocal folds, and is ejected from the airway.  7     Contrast enters the airway, crosses the plane of the vocal folds, and is not ejected from the airway despite effort.  8     Contrast enters the airway, crosses the plane of the vocal folds, is not ejected from the airway and there is no response to aspiration.      Oral phase:  Incomplete mastication of pieces of SB6. Loss of bolus control with melting ice resulting in silent aspiration before the swallow.      Pharyngeal phase:  Pharyngeal phase characterized by impairments in  cough strength, BOT retraction, epiglottic inversion, sensory integrity, laryngeal vestibule closure (LVC), pharyngeal shortening, pharyngeal constriction, and PES opening, which resulted in the following:  - Aspiration of TN0 before and during (inferred) the swallow. Patient was variably sensate. Aspirate did not reliably clear from the airway with cough  - Trace deep penetration  "(to the vocal folds) with MT2 during the swallow (inferred) with rapid, sequential straw sips. Penetrate did clear from the airway using cued cough.  - Penetration with TN0 (using strategies) and LQ3 during the swallow (inferred). Penetrate did eventually clear from the airway but it took multiple strategies    - Mild vallecular residue with TN0; moderate with MT2, LQ3, PU4; and severe with SB6 (mixed) that did not consistently clear spontaneously  - Mild pyriform sinus residue with all trialed consistencies that did not consistently clear spontaneously  - Mild PPW residue with LQ3, PU4, and SB6 (mixed) that did not consistently clear spontaneously    Of note - patient reported her neck is \"basically bionic\" in reference to hardware. Per H&P, patient had ACDF and PCDF in 2016      Compensatory Strategies:  Cough (spontaneous) - INEFFECTIVE to reliably clear aspirate; EFFECTIVE to clear penetration  3-sec prep - EFFECTIVE to prevent airway invasion with TN0 but patient was only agreeable to one attempt  Throat clear, cleansing swallow - INEFFECTIVE to clear penetrate  Cleansing swallow - INEFFECTIVE to clear penetrate; SOMEWHAT EFFECTIVE to reduce, but not clear pharyngeal stasis  Liquid wash - SOMEWHAT EFFECTIVE to reduce, but not clear pharyngeal stasis      Severity Rating:  LIANNE: Moderate-Severe      Clinical Impressions  The pt presents with a moderate-severe oropharyngeal dysphagia, likely acute on chronic related to acute respiratory failure and intubation in the setting of reported chronic dysphagia symptoms, hx of ACDF, and reported recurring PNAs. Swallow safety and efficiency are both impaired. Deficits are best managed with diet modification and compensatory strategies at this time. Risks for both aspiration PNA and dehydration/malnutrition are elevated given recommendation for modified diet and spv with feeding and given presence of silent aspiration. Recommend Puree solids (PU4)/Mildly thick liquids " "(MT2) diet at this time with use of 1:1 spv, small sips, and alternating bites/sips.     Pt will likely need repeat instrumentation prior to upgrading diet given silent aspiration of TN0 . Patient is a good candidate for behavioral and exercise-based swallow rehabilitation. Consider training the following evidence-based swallowing exercises based on patient-specific pathophysiology: effortful swallow, tongue pull back, and expiratory muscle strength training (EMST; with PEEP valve). Outcomes for pulmonary hygiene should be maximized with use of mobility as pt is able and frequent, thorough oral care.       Recommendations  Puree solids / mildly thick liquids  Medication: Crush with pudding/puree, as appropriate  Supervision: Direct supervision during meals, Monitor due to impulsive behavior  Positioning: Fully upright and midline during oral intake, Meals sitting upright in a chair, as tolerated  Strategies: Small bites/sips, Alternate bites and sips, Slow rate of intake, Multiple swallows (2-3) per bite/sips, Reduce environmental distractions  Oral Care: Pre- and post-meals  Additional Instrumentation: Repeat diagnostic study when clinically appropriate (MBSS as appropriate)       SLP Treatment Plan  Treatment Plan: Dysphagia Treatment, Patient/Family/Caregiver Training  SLP Frequency: 4x Per Week  Estimated Duration: Until Therapy Goals Met      Anticipated Discharge Needs  Discharge Recommendations: Recommend post-acute placement for additional speech therapy services prior to discharge home   Therapy Recommendations Upon DC: Dysphagia Training, Community Re-Integration, Patient / Family / Caregiver Education       Patient / Family Goals  Patient / Family Goal #1: \"I need more ice.\"  Goal #1 Outcome: Progressing as expected  Short Term Goal # 1: Pt will complete FEES w SLP to further evaluate swalow function and inform POC.  Goal Outcome # 1: Goal met, new goal added  Short Term Goal # 1 B : Pt will consume diet " of PU4/MT2 given strategy use with no worsening of respiratory status.  Short Term Goal # 2: Pt will complete exercises targeting BOT retraction, epiglottic deflection, LVC, cough strength, UES opening, and pharyngeal shortening x50 in a session given MOD cues from SLP.  Short Term Goal # 3: Pt will verbalize 5 precautions related to her dysphagia given MIN cues from SLP.      Cecilia Ortez, SLP

## 2025-04-21 NOTE — DIETARY
Nutrition Services Brief Update:    Problem: Nutritional:  Goal: Nutrition support tolerated and meeting greater than 85% of estimated needs  Outcome: Not met    Pt was assessed for tube feeding 4/19.  Pt was extubated 4/19, and transferred from the ICU to the floor that afternoon.  Tube feeding reached goal rate the morning of 4/20.  Pt dislodged enteral tube 4/20. Swallow evaluation with SLP.  SLP recommended pt remain NPO with FEES follow up.   Pt requesting oral nutrition supplements from Nutrition Representative. RD will add supplement order and menu will be adjusted accordingly.    RD will follow.

## 2025-04-21 NOTE — PROGRESS NOTES
Cardiac rhythm: SR  Rate range (shift): 65-73 bpm    Ectopy: none      FL: 0.13  QRS: 0.05  QT: 0.43

## 2025-04-22 VITALS
HEART RATE: 90 BPM | TEMPERATURE: 98.4 F | SYSTOLIC BLOOD PRESSURE: 134 MMHG | WEIGHT: 89.73 LBS | BODY MASS INDEX: 14.42 KG/M2 | OXYGEN SATURATION: 90 % | RESPIRATION RATE: 18 BRPM | HEIGHT: 66 IN | DIASTOLIC BLOOD PRESSURE: 90 MMHG

## 2025-04-22 PROBLEM — R79.89 ELEVATED TROPONIN: Status: RESOLVED | Noted: 2025-04-17 | Resolved: 2025-04-22

## 2025-04-22 PROBLEM — J69.0 ASPIRATION PNEUMONIA (HCC): Status: RESOLVED | Noted: 2025-04-17 | Resolved: 2025-04-22

## 2025-04-22 PROBLEM — E43 SEVERE PROTEIN-CALORIE MALNUTRITION (HCC): Status: ACTIVE | Noted: 2025-04-17

## 2025-04-22 PROBLEM — A41.9 SEPSIS (HCC): Status: RESOLVED | Noted: 2025-04-17 | Resolved: 2025-04-22

## 2025-04-22 PROBLEM — G92.8 TOXIC METABOLIC ENCEPHALOPATHY: Status: ACTIVE | Noted: 2025-04-17

## 2025-04-22 PROBLEM — G92.8 TOXIC METABOLIC ENCEPHALOPATHY: Status: RESOLVED | Noted: 2025-04-17 | Resolved: 2025-04-22

## 2025-04-22 PROBLEM — F13.20 BENZODIAZEPINE DEPENDENCE, CONTINUOUS (HCC): Status: ACTIVE | Noted: 2025-04-22

## 2025-04-22 PROBLEM — J96.21 ACUTE ON CHRONIC RESPIRATORY FAILURE WITH HYPOXIA (HCC): Status: ACTIVE | Noted: 2025-04-17

## 2025-04-22 PROBLEM — J96.21 ACUTE ON CHRONIC RESPIRATORY FAILURE WITH HYPOXIA (HCC): Status: RESOLVED | Noted: 2025-04-17 | Resolved: 2025-04-22

## 2025-04-22 PROBLEM — Z79.899 POLYPHARMACY: Status: ACTIVE | Noted: 2025-04-22

## 2025-04-22 PROBLEM — J69.0 ASPIRATION PNEUMONIA (HCC): Status: ACTIVE | Noted: 2025-04-17

## 2025-04-22 LAB
ALBUMIN SERPL BCP-MCNC: 2.9 G/DL (ref 3.2–4.9)
ALBUMIN/GLOB SERPL: 0.6 G/DL
ALP SERPL-CCNC: 153 U/L (ref 30–99)
ALT SERPL-CCNC: 95 U/L (ref 2–50)
ANION GAP SERPL CALC-SCNC: 11 MMOL/L (ref 7–16)
AST SERPL-CCNC: 48 U/L (ref 12–45)
BACTERIA BLD CULT: NORMAL
BACTERIA BLD CULT: NORMAL
BILIRUB SERPL-MCNC: 0.4 MG/DL (ref 0.1–1.5)
BUN SERPL-MCNC: 35 MG/DL (ref 8–22)
CALCIUM ALBUM COR SERPL-MCNC: 9.7 MG/DL (ref 8.5–10.5)
CALCIUM SERPL-MCNC: 8.8 MG/DL (ref 8.5–10.5)
CHLORIDE SERPL-SCNC: 97 MMOL/L (ref 96–112)
CO2 SERPL-SCNC: 28 MMOL/L (ref 20–33)
CREAT SERPL-MCNC: 0.95 MG/DL (ref 0.5–1.4)
ERYTHROCYTE [DISTWIDTH] IN BLOOD BY AUTOMATED COUNT: 48.4 FL (ref 35.9–50)
GFR SERPLBLD CREATININE-BSD FMLA CKD-EPI: 63 ML/MIN/1.73 M 2
GLOBULIN SER CALC-MCNC: 5.1 G/DL (ref 1.9–3.5)
GLUCOSE SERPL-MCNC: 88 MG/DL (ref 65–99)
HCT VFR BLD AUTO: 41.7 % (ref 37–47)
HCV RNA SERPL NAA+PROBE-ACNC: ABNORMAL IU/ML
HCV RNA SERPL NAA+PROBE-LOG IU: 7.1 LOG IU/ML
HCV RNA SERPL QL NAA+PROBE: DETECTED
HGB BLD-MCNC: 13.1 G/DL (ref 12–16)
MCH RBC QN AUTO: 26.4 PG (ref 27–33)
MCHC RBC AUTO-ENTMCNC: 31.4 G/DL (ref 32.2–35.5)
MCV RBC AUTO: 83.9 FL (ref 81.4–97.8)
PLATELET # BLD AUTO: 422 K/UL (ref 164–446)
PMV BLD AUTO: 10 FL (ref 9–12.9)
POTASSIUM SERPL-SCNC: 4.2 MMOL/L (ref 3.6–5.5)
PROT SERPL-MCNC: 8 G/DL (ref 6–8.2)
RBC # BLD AUTO: 4.97 M/UL (ref 4.2–5.4)
SIGNIFICANT IND 70042: NORMAL
SIGNIFICANT IND 70042: NORMAL
SITE SITE: NORMAL
SITE SITE: NORMAL
SODIUM SERPL-SCNC: 136 MMOL/L (ref 135–145)
SOURCE SOURCE: NORMAL
SOURCE SOURCE: NORMAL
WBC # BLD AUTO: 9.3 K/UL (ref 4.8–10.8)

## 2025-04-22 PROCEDURE — 700102 HCHG RX REV CODE 250 W/ 637 OVERRIDE(OP): Performed by: STUDENT IN AN ORGANIZED HEALTH CARE EDUCATION/TRAINING PROGRAM

## 2025-04-22 PROCEDURE — 700102 HCHG RX REV CODE 250 W/ 637 OVERRIDE(OP)

## 2025-04-22 PROCEDURE — 97535 SELF CARE MNGMENT TRAINING: CPT

## 2025-04-22 PROCEDURE — 700111 HCHG RX REV CODE 636 W/ 250 OVERRIDE (IP): Mod: JZ | Performed by: STUDENT IN AN ORGANIZED HEALTH CARE EDUCATION/TRAINING PROGRAM

## 2025-04-22 PROCEDURE — A9270 NON-COVERED ITEM OR SERVICE: HCPCS | Performed by: STUDENT IN AN ORGANIZED HEALTH CARE EDUCATION/TRAINING PROGRAM

## 2025-04-22 PROCEDURE — A9270 NON-COVERED ITEM OR SERVICE: HCPCS

## 2025-04-22 PROCEDURE — 85027 COMPLETE CBC AUTOMATED: CPT

## 2025-04-22 PROCEDURE — RXMED WILLOW AMBULATORY MEDICATION CHARGE

## 2025-04-22 PROCEDURE — 36415 COLL VENOUS BLD VENIPUNCTURE: CPT

## 2025-04-22 PROCEDURE — 80053 COMPREHEN METABOLIC PANEL: CPT

## 2025-04-22 PROCEDURE — 99239 HOSP IP/OBS DSCHRG MGMT >30: CPT | Mod: GC | Performed by: STUDENT IN AN ORGANIZED HEALTH CARE EDUCATION/TRAINING PROGRAM

## 2025-04-22 PROCEDURE — 97162 PT EVAL MOD COMPLEX 30 MIN: CPT

## 2025-04-22 RX ORDER — NICOTINE 21 MG/24HR
1 PATCH, TRANSDERMAL 24 HOURS TRANSDERMAL EVERY 24 HOURS
Qty: 14 PATCH | Refills: 0 | Status: SHIPPED | OUTPATIENT
Start: 2025-04-22 | End: 2025-05-08

## 2025-04-22 RX ORDER — DULOXETIN HYDROCHLORIDE 60 MG/1
60 CAPSULE, DELAYED RELEASE ORAL DAILY
Qty: 14 CAPSULE | Refills: 0 | Status: SHIPPED | OUTPATIENT
Start: 2025-04-30 | End: 2025-05-14

## 2025-04-22 RX ORDER — FUROSEMIDE 40 MG/1
80 TABLET ORAL
Status: DISCONTINUED | OUTPATIENT
Start: 2025-04-22 | End: 2025-04-22 | Stop reason: HOSPADM

## 2025-04-22 RX ORDER — LIDOCAINE 50 MG/G
1 PATCH TOPICAL EVERY 24 HOURS
Qty: 10 PATCH | Refills: 0 | Status: SHIPPED | OUTPATIENT
Start: 2025-04-22

## 2025-04-22 RX ORDER — METHADONE HYDROCHLORIDE 10 MG/1
20 TABLET ORAL DAILY
Qty: 10 TABLET | Refills: 0 | Status: SHIPPED | OUTPATIENT
Start: 2025-04-23 | End: 2025-04-29

## 2025-04-22 RX ORDER — DULOXETIN HYDROCHLORIDE 30 MG/1
30 CAPSULE, DELAYED RELEASE ORAL DAILY
Qty: 7 CAPSULE | Refills: 0 | Status: SHIPPED | OUTPATIENT
Start: 2025-04-22 | End: 2025-05-01

## 2025-04-22 RX ORDER — ACETAMINOPHEN 500 MG
1000 TABLET ORAL EVERY 8 HOURS
Qty: 30 TABLET | Refills: 0 | Status: SHIPPED | OUTPATIENT
Start: 2025-04-22

## 2025-04-22 RX ORDER — ONDANSETRON 4 MG/1
4 TABLET, ORALLY DISINTEGRATING ORAL EVERY 4 HOURS PRN
Qty: 10 TABLET | Refills: 0 | Status: SHIPPED | OUTPATIENT
Start: 2025-04-22

## 2025-04-22 RX ADMIN — CITALOPRAM HYDROBROMIDE 10 MG: 20 TABLET ORAL at 05:58

## 2025-04-22 RX ADMIN — ACETAMINOPHEN 1000 MG: 500 TABLET, FILM COATED ORAL at 05:58

## 2025-04-22 RX ADMIN — NICOTINE 14 MG: 14 PATCH TRANSDERMAL at 05:59

## 2025-04-22 RX ADMIN — METHADONE HYDROCHLORIDE 20 MG: 10 TABLET ORAL at 05:59

## 2025-04-22 RX ADMIN — OXYCODONE HYDROCHLORIDE 10 MG: 10 TABLET ORAL at 09:14

## 2025-04-22 RX ADMIN — AMOXICILLIN AND CLAVULANATE POTASSIUM 1 TABLET: 875; 125 TABLET, FILM COATED ORAL at 05:58

## 2025-04-22 RX ADMIN — FUROSEMIDE 80 MG: 40 TABLET ORAL at 06:28

## 2025-04-22 RX ADMIN — SENNOSIDES AND DOCUSATE SODIUM 2 TABLET: 50; 8.6 TABLET ORAL at 05:59

## 2025-04-22 ASSESSMENT — GAIT ASSESSMENTS
DISTANCE (FEET): 45
ASSISTIVE DEVICE: FRONT WHEEL WALKER
DEVIATION: BRADYKINETIC;OTHER (COMMENT)
GAIT LEVEL OF ASSIST: STANDBY ASSIST

## 2025-04-22 ASSESSMENT — COGNITIVE AND FUNCTIONAL STATUS - GENERAL
MOBILITY SCORE: 19
MOVING FROM LYING ON BACK TO SITTING ON SIDE OF FLAT BED: A LITTLE
WALKING IN HOSPITAL ROOM: A LITTLE
CLIMB 3 TO 5 STEPS WITH RAILING: A LITTLE
SUGGESTED CMS G CODE MODIFIER MOBILITY: CK
STANDING UP FROM CHAIR USING ARMS: A LITTLE
MOVING TO AND FROM BED TO CHAIR: A LITTLE

## 2025-04-22 ASSESSMENT — ACTIVITIES OF DAILY LIVING (ADL): TOILETING: INDEPENDENT

## 2025-04-22 ASSESSMENT — PAIN DESCRIPTION - PAIN TYPE
TYPE: CHRONIC PAIN
TYPE: ACUTE PAIN

## 2025-04-22 NOTE — THERAPY
"Occupational Therapy Contact Note    Patient Name: Manisha Rincon  Age:  74 y.o., Sex:  female  Medical Record #: [id number]  Today's Date: 4/22/2025    OT evaluation attempted, pt declined stating she \"just got up and did all that with the PT.\" Pt provided education on role of OT, equipment recommendations and home environment modifications to increase ADL safety. Pt states no concerns for ADLs upon DC and reports her caregiver plans to stay with her for 1-2 weeks to ensure her safety. Pt reports plan to DC home today, she is open to home health therapy. Will re-attempt full OT evaluation as able.      04/22/25 1105   Prior Living Situation   Prior Services None   Housing / Facility 1 Story Apartment / Condo   Steps Into Home 0   Steps In Home 0   Equipment Owned 4-Wheel Walker;Oxygen   Lives with - Patient's Self Care Capacity Alone and Able to Care For Self   Comments pt reports caregiver assists with IADLs and ADLs as needed, caregiver plans to stay with her for 1-2weeks   Prior Level of ADL Function   Self Feeding Independent   Grooming / Hygiene Independent   Bathing Independent   Dressing Independent   Toileting Independent   Vitals   Patient BP Position Supine   Pulse Oximetry 90 %   O2 (LPM) 2   O2 Delivery Device Nasal Cannula   Bed Mobility    Comments declined \"I just did that with the PT\"   ADL Assessment   Comments refused OOB ADL participation   Functional Mobility   Comments declined \"I just did that with the PT\"   Education Group   Education Provided Energy Conservation;Home Safety;Transfers;Role of Occupational Therapist;Activities of Daily Living;Pathology of bedrest   Role of Occupational Therapist Patient Response Patient;Acceptance;Explanation;Verbal Demonstration   Energy Conservation Patient Response Patient;Acceptance;Explanation;Verbal Demonstration   Home Safety Patient Response Patient;Acceptance;Explanation;Verbal Demonstration   Transfers Patient Response " "Patient;Acceptance;Explanation;Verbal Demonstration   ADL Patient Response Patient;Acceptance;Explanation;Verbal Demonstration   Pathology of Bedrest Patient Response Patient;Acceptance;Explanation;Verbal Demonstration   Interdisciplinary Plan of Care Collaboration   IDT Collaboration with  Nursing   Patient Position at End of Therapy In Bed   Collaboration Comments Pt declined OOB activity 2/2 \"just did that with the PT\"       "

## 2025-04-22 NOTE — DISCHARGE PLANNING
Case Management Discharge Planning    Admission Date: 4/17/2025  GMLOS: 4.9  ALOS: 5    6-Clicks ADL Score: 12  6-Clicks Mobility Score: 19  PT and/or OT Eval ordered: Yes  Post-acute Referrals Ordered: Yes  Post-acute Choice Obtained: Yes  Has referral(s) been sent to post-acute provider:  Yes      Anticipated Discharge Dispo: Discharge Disposition: D/T to home under HHA care in anticipation of covered skilled care (06)    DME Needed: No    Action(s) Taken: Updated Provider/Nurse on Discharge Plan, Choice obtained, and Referral(s) sentPatient discussed during IDT rounds with medical team. Choice form given for home health, [facility] is first choice, [facility] is second. States she has oxygen at home, oxygen provided for ride home. IMM signed.    Escalations Completed: None    Medically Clear: No    Next Steps: Case Management will continue to follow for discharge planning needs.    Barriers to Discharge: Medical clearance    Is the patient up for discharge tomorrow: No    1229  Home health choice signed, first choice is [facility].

## 2025-04-22 NOTE — DISCHARGE SUMMARY
[facility] Discharge Summary    Attending: Dr. Rodriguez.  Senior Resident: Dr. Camarena  Intern:  Dr. Monroe   Contact Number: [phone]    CHIEF COMPLAINT ON ADMISSION  Chief Complaint   Patient presents with    Drug Overdose    ALOC       Reason for Admission  ems     Admission Date  4/17/2025    CODE STATUS  Full Code    HPI & HOSPITAL COURSE  74-year-old female with PMHx of anxiety, opioid dependence/chronic pain on methadone, benzodiazepine dependence, HFpEF, and PE who was brought into the hospital by EMS on 4/17 after a friend found her down on the floor.  There were reports of empty methadone and antibiotic pill bottles next to the patient.  Last well-known was around 10:30 PM on 4/16.  Per EMS, GCS was 6 initially, improved to 10 after Narcan.  She was intubated emergently in the ED due to hypoxia and altered mentation/unable to protect airways.  She was extubated successfully 4/19/2025 and moved to the floor.  She is being treated empirically for aspiration pneumonia with Unasyn (completed 3 days of azithromycin in the ICU).      Patient reports that she takes 55 mg of methadone daily per her report but we have been unable to confirm with the clinic that she typically goes to (closed over the weekend).  She was started on 20 mg methadone while inpatient, with as needed oxycodone for additional pain management.     She was initially started on tube feeds while in the ICU while intubated.  However she pulled out her NG tube on 4/20/2025, hence tube feeds were discontinued.  She is pending SLP evaluation and diet once appropriate.  As per our assessment, she does appear to be aspirating and has a poor cough.     Of note, looked at her PDMP and it appears that she is also on alprazolam 1 mg thrice daily and zolpidem 5 mg daily at bedtime. Patient reports that she does not take zolpidem every day but has been on alprazolam for over 20 years does not want to discontinue it.    SLP performed FEES and  diagnosed her with oropharyngeal dysphagia and recommended home health speech therapy.  Today she is being discharged in a stable condition with home health physical therapy, Occupational Therapy and speech.  Patient tells us that she has oxygen at home to use.  However oxygen tank was given to her for her transport.      Educated patient about methadone in combination with alprazolam and Xanax causing central nervous system and respiratory depression.    She is discharged in good and stable condition to home with close outpatient follow-up.    The patient met 2-midnight criteria for an inpatient stay at the time of discharge.    Discharge Date  4/22/2025    Physical Exam on Day of Discharge  Physical Exam  Constitutional:       General: She is not in acute distress.     Appearance: Normal appearance. She is well-developed. She is cachectic. She is not diaphoretic.   HENT:      Head: Normocephalic and atraumatic.   Neck:      Vascular: No JVD.   Cardiovascular:      Rate and Rhythm: Normal rate and regular rhythm.      Heart sounds: No murmur heard.  Pulmonary:      Effort: Pulmonary effort is normal. No respiratory distress.      Breath sounds: No stridor. No wheezing or rales.   Abdominal:      Palpations: Abdomen is soft.      Tenderness: There is no abdominal tenderness. There is no guarding or rebound.   Musculoskeletal:      Right lower leg: No edema.      Left lower leg: No edema.   Skin:     General: Skin is warm and dry.      Findings: No rash.   Neurological:      Mental Status: She is oriented to person, place, and time.      Comments: Alert oriented and interactive  Face symmetric speech clear and logical  Antigravity x 4   Psychiatric:         Mood and Affect: Mood normal.         Behavior: Behavior normal.         Thought Content: Thought content normal.         FOLLOW UP ITEMS POST DISCHARGE  Primary care to assess patient's volume status and consider diuretics if needed.  At the point of discharge  patient does not seem hypervolemic and hence is not being discharged on any Lasix which she has needed during her hospital stay.      DISCHARGE DIAGNOSES  Principal Problem:    Respiratory failure with hypoxia (HCC) (POA: Yes)  Active Problems:    AMS (altered mental status) (POA: Unknown)    CAP (community acquired pneumonia) (POA: Unknown)    Stage 3 chronic kidney disease (POA: Unknown)    Chronic prescription opiate use (POA: Unknown)    Tobacco use disorder (POA: Yes)    Other specified anemias (POA: Yes)    Sepsis (HCC) (POA: Unknown)    Elevated troponin (POA: Unknown)    Elevated brain natriuretic peptide (BNP) level (POA: Unknown)    Malnutrition (HCC) (POA: Unknown)    PTSD (post-traumatic stress disorder) (POA: Unknown)    Mass of right lung (POA: Unknown)    Liver injury (POA: Unknown)  Resolved Problems:    * No resolved hospital problems. *      FOLLOW UP  No future appointments.  Bean Bedoya M.D.  [address] [facility] [address]  [facility] [address]  [phone]    Schedule an appointment as soon as possible for a visit in 1 month(s)  after-hospital psychiatry follow up    Mohsen Tamasaby, M.D.  [address] [id number] [phone]    Schedule an appointment as soon as possible for a visit in 1 week(s)        MEDICATIONS ON DISCHARGE     Medication List        START taking these medications        Instructions   acetaminophen 500 MG Tabs  Commonly known as: Tylenol   Take 2 Tablets by mouth every 8 hours.  Dose: 1,000 mg     amoxicillin-clavulanate 875-125 MG Tabs  Commonly known as: Augmentin   Take 1 Tablet by mouth every 12 hours for 2 days.  Dose: 1 Tablet     * DULoxetine 30 MG Cpep  Commonly known as: Cymbalta   Take 1 Capsule by mouth every day for 7 days.  Dose: 30 mg     * DULoxetine 60 MG Cpep delayed-release capsule  Start taking on: April 30, 2025  Commonly known as: Cymbalta   Take 1 Capsule by mouth every day for 14 days.  Dose: 60 mg     lidocaine 5 % Ptch  Commonly  known as: Lidoderm   Place 1 Patch on the skin every 24 hours.  Dose: 1 Patch     methadone 10 MG Tabs  Start taking on: April 23, 2025  Commonly known as: Dolophine  Replaces: methadone 10 MG/ML Conc   Take 2 Tablets by mouth every day for 5 days.  Dose: 20 mg     nicotine 14 MG/24HR Pt24  Commonly known as: Nicoderm   Place 1 Patch on the skin every 24 hours for 6 days.  Dose: 1 Patch     ondansetron 4 MG Tbdp  Commonly known as: Zofran ODT   Take 1 Tablet by mouth every four hours as needed for Nausea/Vomiting (give by mouth if IV route is unavailable.).  Dose: 4 mg           * This list has 2 medication(s) that are the same as other medications prescribed for you. Read the directions carefully, and ask your doctor or other care provider to review them with you.                CONTINUE taking these medications        Instructions   prazosin 1 MG Caps  Commonly known as: Minipress   Take 1 mg by mouth every evening. Indications: PTSD  Dose: 1 mg            STOP taking these medications      ALPRAZolam 1 MG Tabs  Commonly known as: Xanax     citalopram 10 MG tablet  Commonly known as: CeleXA     methadone 10 MG/ML Conc  Commonly known as: Dolophine  Replaced by: methadone 10 MG Tabs     zolpidem 5 MG Tabs  Commonly known as: Ambien              Allergies  No Known Allergies    DIET  Orders Placed This Encounter   Procedures    Diet Order Diet: Level 4 - Pureed; Liquid level: Level 2 - Mildly Thick; Tray Modifications (optional): SLP - 1:1 Supervision by Nursing     Standing Status:   Standing     Number of Occurrences:   1     Diet::   Level 4 - Pureed [25]     Liquid level:   Level 2 - Mildly Thick     Tray Modifications (optional):   SLP - 1:1 Supervision by Nursing       ACTIVITY  As tolerated.  Weight bearing as tolerated    CONSULTATIONS  Continue to follow-up with primary care    PROCEDURES  None    LABORATORY  Lab Results   Component Value Date    SODIUM 136 04/22/2025    POTASSIUM 4.2 04/22/2025    CHLORIDE  97 04/22/2025    CO2 28 04/22/2025    GLUCOSE 88 04/22/2025    BUN 35 (H) 04/22/2025    CREATININE 0.95 04/22/2025    CREATININE 0.7 03/10/2009        Lab Results   Component Value Date    WBC 9.3 04/22/2025    HEMOGLOBIN 13.1 04/22/2025    HEMATOCRIT 41.7 04/22/2025    PLATELETCT 422 04/22/2025        Total time of the discharge process exceeds 45 minutes.

## 2025-04-22 NOTE — THERAPY
Physical Therapy   Initial Evaluation     Patient Name: Manisha Rincon  Age:  74 y.o., Sex:  female  Medical Record #: [id number]  Today's Date: 4/22/2025     Precautions  Precautions: Fall Risk    Assessment  Patient is 74 y.o. female admitted 4/17/2025 with altered mentation, found down by friend.     Currently been managed for respiratory failure with hypoxia, CAP, AMS, acute ischemic hepatitis, sepsis     PMH: anxiety, opioid dependence, chronic pain on methadone, benzodiazepine dependence, HFpEF, PE, CKD, R lung mass.  PTSD, malnutrition, tobacco use disorder     Patient seen for PT evaluation. Patient in bed, agreeable for the session. Able to demonstrate functional mobility tasks as detailed below. Currently appears to be below baseline level of functional mobility. Will continue to benefit from PT services to help improve overall functional mobility. Recommend  PT at this time.     Plan    Physical Therapy Initial Treatment Plan   Treatment Plan : Bed Mobility, Equipment, Family / Caregiver Training, Gait Training, Neuro Re-Education / Balance, Therapeutic Activities, Therapeutic Exercise  Treatment Frequency: 4 Times per Week  Duration: Until Therapy Goals Met    DC Equipment Recommendations:  (Pulse oximeter)  Discharge Recommendations: Recommend home health for continued physical therapy services (With assistance/supervision from her caregiver)    Objective     04/22/25 0930   Time In/Time Out   Therapy Start Time 0857   Therapy End Time 0930   Total Therapy Time 33   Initial Contact Note    Initial Contact Note Order Received and Verified, Physical Therapy Evaluation in Progress with Full Report to Follow.   Precautions   Precautions Fall Risk   Vitals   Pulse 90   Patient BP Position Sitting  (In the chair, post activity)   Blood Pressure  (!) 134/90   Pulse Oximetry 89 %   O2 (LPM) 2   O2 Delivery Device Nasal Cannula   Vitals Comments During ambulation, patient's SpO2 dropped to mid 80s in room  air, requiring 4L to increase and sustain SpO2 at 88 and above. At rest, on 2L, patient's SpO2 sustains b/w 89-90.   Pain   Pain Scales 0 to 10 Scale    Intervention Nurse Notified;Rest;Repositioned   Pain 0 - 10 Group   Location Rib Cage   Location Orientation Right   Therapist Pain Assessment Prior to Activity;During Activity;Post Activity;Post Activity Pain Same as Prior to Activity;Nurse Notified  (Patient was medicated during the session)   Prior Living Situation   Prior Services None   Housing / Facility 1 Story Apartment / Condo   Steps Into Home 0   Steps In Home 0   Equipment Owned 4-Wheel Walker;Oxygen   Lives with - Patient's Self Care Capacity Alone and Able to Care For Self   Comments Patient mentioned that she has a caregiver who will be staying with her for the next 1-2 weeks and will be able to assist her as needed.   Prior Level of Functional Mobility   Bed Mobility Independent   Transfer Status Independent   Ambulation Independent   Ambulation Distance Limited community   Assistive Devices Used None   Comments Patient mentioned that she does not use her home O2 at all times, she uses it only as needed.   History of Falls   History of Falls Yes   Date of Last Fall   (H/O 1 fall PTA, mirror fell on her, hurt her R ribcage)   Cognition    Level of Consciousness Alert   Passive ROM Lower Body   Passive ROM Lower Body X   Comments B/L knee extension slightly limited due to muscle tightness and/or joint stiffness   Active ROM Lower Body    Active ROM Lower Body  WDL   Strength Lower Body   Lower Body Strength  X   Comments Grossly BLE 4/5   Sensation Lower Body   Lower Extremity Sensation   X   Comments Reports of intermittent tingling in B/L lower leg, from knee below, associated with H/O cellulitis   Vision   Vision Comments Glasses-all the time, L eye blind   Other Treatments   Other Treatments Provided Patient does not have pulse oximeter at home, does not monitor her SpO2 levels at home, uses  additional O2 when she feels SOB. During today's session, patient's SpO2 dropped to mid 80s and patient was asymptomatic, patient was educated to monitor her SpO2 at home and continue use of O2 at all times at this time. Educated on activity pacing, safety precautions during mobility, use of 4WW for improved balance and possible energy conservation for the first couple of weeks. Discussed about HH PT, patient agreeable for it. Supervised to the bathroom to void, per patient's request. Assisted with set up of breakfast tray at end of session. Patient was educated to perform incentive spirometer every 1 hour, she was able to demonstrate the appropriate technique, 1000mL.    Balance Assessment   Sitting Balance (Static) Fair +   Sitting Balance (Dynamic) Fair +   Standing Balance (Static) Fair   Standing Balance (Dynamic) Fair   Weight Shift Sitting Good   Weight Shift Standing Good   Comments Seated EOB; Standing with FWW   Bed Mobility    Supine to Sit Standby Assist   Scooting Standby Assist   Rolling Standby Assist   Comments HOB flat, use of bed rail, towards L side   Gait Analysis   Gait Level Of Assist Standby Assist   Assistive Device Front Wheel Walker   Distance (Feet) 45  (2 seated rest breaks)   # of Times Distance was Traveled 3   Deviation Bradykinetic;Other (Comment)  (Slight forward trunk lean)   Comments Cues for pacing, directions, distance b/w self and AD   Functional Mobility   Sit to Stand Standby Assist   Bed, Chair, Wheelchair Transfer Standby Assist   Toilet Transfers Contact Guard Assist  (Low toilet seat, use of grab bar on R)   Transfer Method Stand Step   Mobility Bed-EOB sitting-sit-stand-ambulate to bathroom-toilet transfer-ambulate in the room-chair-use of hand wipe-sit-stand-ambulate in the hallway-chair   Comments W FWW; cues for hand placement, LE placement   6 Clicks Assessment - How much HELP from from another person do you currently need... (If the patient hasn't done an activity  recently, how much help from another person do you think he/she would need if he/she tried?)   Turning from your back to your side while in a flat bed without using bedrails? 4   Moving from lying on your back to sitting on the side of a flat bed without using bedrails? 3   Moving to and from a bed to a chair (including a wheelchair)? 3   Standing up from a chair using your arms (e.g., wheelchair, or bedside chair)? 3   Walking in hospital room? 3   Climbing 3-5 steps with a railing? 3   6 clicks Mobility Score 19   Activity Tolerance   Sitting in Chair Post session   Edema / Skin Assessment   Edema / Skin  X   Comments Discoloration in B/L lower leg, starting from little above ankle region   Patient / Family Goals    Patient / Family Goal #1 To return to prior level of functional mobility   Short Term Goals    Short Term Goal # 1 Patient will perform supine-sit, sit-supine with HOB flat without rails with supervision in 6 visits to safely get in & out of bed   Short Term Goal # 2 Patient will perform sit-stand with LRAD with supervision in 6 visits to progress with functional mobility   Short Term Goal # 3 Patient will perform chair transfers with LRAD with supervision in 6 visits to safely get OOB to chair   Short Term Goal # 4 Patient will ambulate 150 feet with LRAD with supervision in 6 visits to safely ambulate household distance   Education Group   Education Provided Role of Physical Therapist   Role of Physical Therapist Patient Response Patient;Acceptance;Explanation;Verbal Demonstration   Physical Therapy Initial Treatment Plan    Treatment Plan  Bed Mobility;Equipment;Family / Caregiver Training;Gait Training;Neuro Re-Education / Balance;Therapeutic Activities;Therapeutic Exercise   Treatment Frequency 4 Times per Week   Duration Until Therapy Goals Met   Problem List    Problems Impaired Bed Mobility;Impaired Transfers;Impaired Ambulation;Functional Strength Deficit;Impaired Balance;Decreased Activity  Tolerance   Anticipated Discharge Equipment and Recommendations   DC Equipment Recommendations   (Pulse oximeter)   Discharge Recommendations Recommend home health for continued physical therapy services  (With assistance/supervision from her caregiver)   Interdisciplinary Plan of Care Collaboration   IDT Collaboration with  Nursing;Occupational Therapist;Certified Nursing Assistant   Patient Position at End of Therapy Call Light within Reach;Tray Table within Reach;Seated;Chair Alarm On;Phone within Reach   Session Information   Date / Session Number  4/22-1(1/4, 4/28)   Priority   (DC-home)

## 2025-04-22 NOTE — DISCHARGE INSTRUCTIONS
You have been admitted to the hospital when you had altered mental status and were found to be overdosed with methadone.  You also were found to be aspirating and had a pneumonia from the aspiration.  We have admitted you to the hospital and given you antibiotics.  Your respiratory status continue to improve.  We are discharging you today in a stable condition with the reduced dose of methadone due to the high risk of ultimately status and respiratory depression.  Please follow-up with your pain clinic and psychiatrist for further follow-up as needed.

## 2025-04-22 NOTE — PROGRESS NOTES
IV out, Furosemide IV was not given. MD Monroe notified, plan to shift to oral. Patient also express of wanting to go home today.  made aware as well.

## 2025-04-22 NOTE — HOSPITAL COURSE
74-year-old female with PMHx of anxiety, opioid dependence/chronic pain on methadone, benzodiazepine dependence, HFpEF, and PE who was brought into the hospital by EMS on 4/17 after a friend found her down on the floor.  There were reports of empty methadone and antibiotic pill bottles next to the patient.  Last well-known was around 10:30 PM on 4/16.  Per EMS, GCS was 6 initially, improved to 10 after Narcan.  She was intubated emergently in the ED due to hypoxia and altered mentation/unable to protect airways.  She was extubated successfully 4/19/2025 and moved to the floor.  She is being treated empirically for aspiration pneumonia with Unasyn (completed 3 days of azithromycin in the ICU).      Patient reports that she takes 55 mg of methadone daily per her report but we have been unable to confirm with the clinic that she typically goes to (closed over the weekend).  She was started on 20 mg methadone while inpatient, with as needed oxycodone for additional pain management.     She was initially started on tube feeds while in the ICU while intubated.  However she pulled out her NG tube on 4/20/2025, hence tube feeds were discontinued.  She is pending SLP evaluation and diet once appropriate.  As per our assessment, she does appear to be aspirating and has a poor cough.     Of note, looked at her PDMP and it appears that she is also on alprazolam 1 mg thrice daily and zolpidem 5 mg daily at bedtime. Patient reports that she does not take zolpidem every day but has been on alprazolam for over 20 years does not want to discontinue it.    SLP performed FEES and diagnosed her with oropharyngeal dysphagia and recommended home health speech therapy.  Today she is being discharged in a stable condition with home health physical therapy, Occupational Therapy and speech.  Patient tells us that she has oxygen at home to use.  However oxygen tank was given to her for her transport.

## 2025-04-22 NOTE — CARE PLAN
The patient is Stable - Low risk of patient condition declining or worsening    Shift Goals  Clinical Goals: Monitor labs and vitals signs,respiratory status,improved mobility  Patient Goals: to go home  Family Goals: KELLEE    Progress made toward(s) clinical / shift goals:        Problem: Hemodynamics  Goal: Patient's hemodynamics, fluid balance and neurologic status will be stable or improve  Outcome: Progressing  Note:   Prior to discharge or change in level of careDocument on Assessment and I/O flowsheet templates1.  Monitor vital signs, pulse oximetry and cardiac monitor per provider order and/or policy2.  Maintain blood pressure per provider order3.  Hemodynamic monitoring per provider order4.  Manage IV fluids and IV infusions5.  Monitor intake and output6.  Daily weights per unit policy or provider order7.  Assess peripheral pulses and capillary refill8.  Assess color and body temperature9.  Position patient for maximum circulation/cardiac [id number]. Monitor for signs/symptoms of excessive [id number]. Assess mental status, restlessness and changes in level of [id number]. Monitor temperature and report fever or hypothermia to provider immediately. Consideration of targeted temperature management.  Mental status remain intact, able to verbalized concerns and needs. Call light within reach         Problem: Fall Risk  Goal: Patient will remain free from falls  Outcome: Progressing  Note: Patient remained free from falls. All fall precautions in place. Patient educated the need to call when needed assistance, patient verbalized understanding. Call light and personal belongings within reach.         Patient is not progressing towards the following goals:

## 2025-04-22 NOTE — PROGRESS NOTES
Patient with discharge order. All PIVs removed. [id number] order received. Patient did not want to wait for [id number], she stated that she will pick it up from [facility] tomorrow, confirmed with pharmacy to hold it for patient.     Caregiver Milly at bedside. O2 tank for dc home at bedside. Discharge instructions given to patient and caregiver, both verbalized understanding. All belongings sent with patient. Patient left the unit via wheelchair, escorted by CNA on 2L of O2 via NC, in no acute distress.

## 2025-04-22 NOTE — DISCHARGE PLANNING
1042  DPA received HH Choice form and placed in Pts media - pending orders    1151  DPA sent HH referral

## 2025-04-24 ENCOUNTER — PHARMACY VISIT (OUTPATIENT)
Dept: PHARMACY | Facility: MEDICAL CENTER | Age: 75
End: 2025-04-24
Payer: COMMERCIAL

## (undated) DEVICE — SUTURE 1 VICRYL PLUS CTX - 36 INCH (36/BX)

## (undated) DEVICE — SUTURE 2-0 VICRYL PLUS CT-1 36 (36PK/BX)"

## (undated) DEVICE — BLADE SAGITTAL SAW DUAL CUT 75.0 X 25.0MM (1/EA)

## (undated) DEVICE — ELECTRODE 850 FOAM ADHESIVE - HYDROGEL RADIOTRNSPRNT (50/PK)

## (undated) DEVICE — SET LEADWIRE 5 LEAD BEDSIDE DISPOSABLE ECG (1SET OF 5/EA)

## (undated) DEVICE — GLOVE BIOGEL INDICATOR SZ 7SURGICAL PF LTX - (50/BX 4BX/CA)

## (undated) DEVICE — KIT ANESTHESIA W/CIRCUIT & 3/LT BAG W/FILTER (20EA/CA)

## (undated) DEVICE — TIP INTPLS HFLO ML ORFC BTRY - (12/CS)  FOR SURGILAV

## (undated) DEVICE — SUTURE 5 TI-CRON HOS-14 - (36/BX)

## (undated) DEVICE — GLOVE BIOGEL ECLIPSE  PF LATEX SIZE 6.5 (50PR/BX)

## (undated) DEVICE — CHLORAPREP 26 ML APPLICATOR - ORANGE TINT(25/CA)

## (undated) DEVICE — GLOVE BIOGEL SZ 7.5 SURGICAL PF LTX - (50PR/BX 4BX/CA)

## (undated) DEVICE — HANDPIECE 10FT INTPLS SCT PLS IRRIGATION HAND CONTROL SET (6/PK)

## (undated) DEVICE — GLOVE BIOGEL INDICATOR SZ 8.5 SURGICAL PF LTX - (50/BX 4BX/CA)

## (undated) DEVICE — GOWN WARMING STANDARD FLEX - (30/CA)

## (undated) DEVICE — GLOVE BIOGEL PI INDICATOR SZ 7.0 SURGICAL PF LF - (50/BX 4BX/CA)

## (undated) DEVICE — TUBE E-T HI-LO CUFF 7.0MM (10EA/PK)

## (undated) DEVICE — SUTURE 2-0 VICRYL PLUS CT-1 - 8 X 18 INCH(12/BX)

## (undated) DEVICE — HEAD HOLDER JUNIOR/ADULT

## (undated) DEVICE — GLOVE BIOGEL INDICATOR SZ 7.5 SURGICAL PF LTX - (50PR/BX 4BX/CA)

## (undated) DEVICE — PACK TOTAL HIP - (1/CA)

## (undated) DEVICE — BRUSH FMCNL TIP IRR STRL - (12/PKG) FOR SURGILAV

## (undated) DEVICE — SUTURE GENERAL

## (undated) DEVICE — SODIUM CHL IRRIGATION 0.9% 1000ML (12EA/CA)

## (undated) DEVICE — SODIUM CHL. IRRIGATION 0.9% 3000ML (4EA/CA 65CA/PF)

## (undated) DEVICE — SUCTION INSTRUMENT YANKAUER BULBOUS TIP W/O VENT (50EA/CA)

## (undated) DEVICE — LENS/HOOD FOR SPACESUIT - (32/PK) PEEL AWAY FACE

## (undated) DEVICE — SENSOR SPO2 NEO LNCS ADHESIVE (20/BX) SEE USER NOTES

## (undated) DEVICE — CANISTER SUCTION 3000ML MECHANICAL FILTER AUTO SHUTOFF MEDI-VAC NONSTERILE LF DISP  (40EA/CA)

## (undated) DEVICE — SYRINGE 30 ML LL (56/BX)

## (undated) DEVICE — TUBING CLEARLINK DUO-VENT - C-FLO (48EA/CA)

## (undated) DEVICE — TRAY CATHETER FOLEY URINE METER W/STATLOCK 350ML (10EA/CA)

## (undated) DEVICE — LEAD SET 6 DISP. EKG NIHON KOHDEN (100EA/CA)

## (undated) DEVICE — PAD LAP STERILE 18 X 18 - (5/PK 40PK/CA)

## (undated) DEVICE — PROTECTOR ULNA NERVE - (36PR/CA)

## (undated) DEVICE — NEPTUNE 4 PORT MANIFOLD - (20/PK)

## (undated) DEVICE — SLEEVE, VASO, THIGH, MED

## (undated) DEVICE — MASK ANESTHESIA ADULT  - (100/CA)

## (undated) DEVICE — KIT ROOM DECONTAMINATION

## (undated) DEVICE — LACTATED RINGERS INJ 1000 ML - (14EA/CA 60CA/PF)

## (undated) DEVICE — DRESSING PETROLEUM GAUZE 5 X 9" (50EA/BX 4BX/CA)"

## (undated) DEVICE — DRAPE STRLE REG TOWEL 18X24 - (10/BX 4BX/CA)"

## (undated) DEVICE — NEEDLE NON SAFETY HYPO 22 GA X 1 1/2 IN (100/BX)

## (undated) DEVICE — GLOVE BIOGEL SZ 8.5 SURGICAL PF LTX - (50PR/BX 4BX/CA)

## (undated) DEVICE — SET EXTENSION WITH 2 PORTS (48EA/CA) ***PART #2C8610 IS A SUBSTITUTE*****

## (undated) DEVICE — GOWN SURGICAL XX-LARGE - (28EA/CA) SIRUS NON REINFORCED

## (undated) DEVICE — ELECTRODE DUAL RETURN W/ CORD - (50/PK)